# Patient Record
Sex: FEMALE | Race: BLACK OR AFRICAN AMERICAN | NOT HISPANIC OR LATINO | Employment: FULL TIME | URBAN - METROPOLITAN AREA
[De-identification: names, ages, dates, MRNs, and addresses within clinical notes are randomized per-mention and may not be internally consistent; named-entity substitution may affect disease eponyms.]

---

## 2017-04-21 ENCOUNTER — HOSPITAL ENCOUNTER (EMERGENCY)
Facility: HOSPITAL | Age: 27
Discharge: HOME | End: 2017-04-21
Attending: EMERGENCY MEDICINE
Payer: COMMERCIAL

## 2017-04-21 VITALS
OXYGEN SATURATION: 100 % | DIASTOLIC BLOOD PRESSURE: 84 MMHG | HEART RATE: 84 BPM | WEIGHT: 140 LBS | BODY MASS INDEX: 21.97 KG/M2 | RESPIRATION RATE: 18 BRPM | TEMPERATURE: 98 F | HEIGHT: 67 IN | SYSTOLIC BLOOD PRESSURE: 119 MMHG

## 2017-04-21 DIAGNOSIS — R10.84 GENERALIZED ABDOMINAL PAIN: Primary | ICD-10-CM

## 2017-04-21 DIAGNOSIS — R10.9 ABDOMINAL PAIN: ICD-10-CM

## 2017-04-21 LAB
ALBUMIN SERPL BCP-MCNC: 4.3 G/DL
ALP SERPL-CCNC: 64 U/L
ALT SERPL W/O P-5'-P-CCNC: 15 U/L
ANION GAP SERPL CALC-SCNC: 9 MMOL/L
AST SERPL-CCNC: 21 U/L
B-HCG UR QL: NEGATIVE
BASOPHILS # BLD AUTO: 0.01 K/UL
BASOPHILS NFR BLD: 0.1 %
BILIRUB SERPL-MCNC: 1.5 MG/DL
BILIRUB UR QL STRIP: NEGATIVE
BUN SERPL-MCNC: 12 MG/DL
CALCIUM SERPL-MCNC: 9.5 MG/DL
CHLORIDE SERPL-SCNC: 106 MMOL/L
CLARITY UR REFRACT.AUTO: CLEAR
CO2 SERPL-SCNC: 22 MMOL/L
COLOR UR AUTO: YELLOW
CREAT SERPL-MCNC: 0.8 MG/DL
CTP QC/QA: YES
DIFFERENTIAL METHOD: ABNORMAL
EOSINOPHIL # BLD AUTO: 0 K/UL
EOSINOPHIL NFR BLD: 0.1 %
ERYTHROCYTE [DISTWIDTH] IN BLOOD BY AUTOMATED COUNT: 11.8 %
EST. GFR  (AFRICAN AMERICAN): >60 ML/MIN/1.73 M^2
EST. GFR  (NON AFRICAN AMERICAN): >60 ML/MIN/1.73 M^2
GLUCOSE SERPL-MCNC: 89 MG/DL
GLUCOSE UR QL STRIP: NEGATIVE
HCT VFR BLD AUTO: 38.8 %
HGB BLD-MCNC: 14.1 G/DL
HGB UR QL STRIP: NEGATIVE
KETONES UR QL STRIP: ABNORMAL
LEUKOCYTE ESTERASE UR QL STRIP: NEGATIVE
LIPASE SERPL-CCNC: 17 U/L
LYMPHOCYTES # BLD AUTO: 0.4 K/UL
LYMPHOCYTES NFR BLD: 5.1 %
MCH RBC QN AUTO: 30.3 PG
MCHC RBC AUTO-ENTMCNC: 36.3 %
MCV RBC AUTO: 83 FL
MONOCYTES # BLD AUTO: 0.4 K/UL
MONOCYTES NFR BLD: 5.6 %
NEUTROPHILS # BLD AUTO: 6.4 K/UL
NEUTROPHILS NFR BLD: 89 %
NITRITE UR QL STRIP: NEGATIVE
PH UR STRIP: 7 [PH] (ref 5–8)
PLATELET # BLD AUTO: 201 K/UL
PMV BLD AUTO: 9.5 FL
POTASSIUM SERPL-SCNC: 3.7 MMOL/L
PROT SERPL-MCNC: 7.3 G/DL
PROT UR QL STRIP: NEGATIVE
RBC # BLD AUTO: 4.65 M/UL
SODIUM SERPL-SCNC: 137 MMOL/L
SP GR UR STRIP: 1.01 (ref 1–1.03)
URN SPEC COLLECT METH UR: ABNORMAL
UROBILINOGEN UR STRIP-ACNC: NEGATIVE EU/DL
WBC # BLD AUTO: 7.2 K/UL

## 2017-04-21 PROCEDURE — 93005 ELECTROCARDIOGRAM TRACING: CPT

## 2017-04-21 PROCEDURE — 81025 URINE PREGNANCY TEST: CPT | Performed by: EMERGENCY MEDICINE

## 2017-04-21 PROCEDURE — 83690 ASSAY OF LIPASE: CPT

## 2017-04-21 PROCEDURE — 25000003 PHARM REV CODE 250: Performed by: STUDENT IN AN ORGANIZED HEALTH CARE EDUCATION/TRAINING PROGRAM

## 2017-04-21 PROCEDURE — 93010 ELECTROCARDIOGRAM REPORT: CPT | Mod: ,,, | Performed by: INTERNAL MEDICINE

## 2017-04-21 PROCEDURE — 99284 EMERGENCY DEPT VISIT MOD MDM: CPT | Mod: 25

## 2017-04-21 PROCEDURE — 96372 THER/PROPH/DIAG INJ SC/IM: CPT

## 2017-04-21 PROCEDURE — 85025 COMPLETE CBC W/AUTO DIFF WBC: CPT

## 2017-04-21 PROCEDURE — 96361 HYDRATE IV INFUSION ADD-ON: CPT

## 2017-04-21 PROCEDURE — 63600175 PHARM REV CODE 636 W HCPCS: Performed by: STUDENT IN AN ORGANIZED HEALTH CARE EDUCATION/TRAINING PROGRAM

## 2017-04-21 PROCEDURE — 99285 EMERGENCY DEPT VISIT HI MDM: CPT | Mod: ,,, | Performed by: EMERGENCY MEDICINE

## 2017-04-21 PROCEDURE — 96374 THER/PROPH/DIAG INJ IV PUSH: CPT

## 2017-04-21 PROCEDURE — 81003 URINALYSIS AUTO W/O SCOPE: CPT

## 2017-04-21 PROCEDURE — 80053 COMPREHEN METABOLIC PANEL: CPT

## 2017-04-21 RX ORDER — DICYCLOMINE HYDROCHLORIDE 10 MG/ML
20 INJECTION INTRAMUSCULAR
Status: COMPLETED | OUTPATIENT
Start: 2017-04-21 | End: 2017-04-21

## 2017-04-21 RX ORDER — DICYCLOMINE HYDROCHLORIDE 20 MG/1
20 TABLET ORAL 2 TIMES DAILY
Qty: 60 TABLET | Refills: 3 | Status: SHIPPED | OUTPATIENT
Start: 2017-04-21 | End: 2017-10-24

## 2017-04-21 RX ORDER — ONDANSETRON 2 MG/ML
4 INJECTION INTRAMUSCULAR; INTRAVENOUS
Status: COMPLETED | OUTPATIENT
Start: 2017-04-21 | End: 2017-04-21

## 2017-04-21 RX ORDER — DICYCLOMINE HYDROCHLORIDE 20 MG/1
20 TABLET ORAL 2 TIMES DAILY
Qty: 60 TABLET | Refills: 0 | Status: SHIPPED | OUTPATIENT
Start: 2017-04-21 | End: 2017-04-21

## 2017-04-21 RX ADMIN — SODIUM CHLORIDE 1000 ML: 0.9 INJECTION, SOLUTION INTRAVENOUS at 07:04

## 2017-04-21 RX ADMIN — DICYCLOMINE HYDROCHLORIDE 20 MG: 20 INJECTION, SOLUTION INTRAMUSCULAR at 07:04

## 2017-04-21 RX ADMIN — ONDANSETRON 4 MG: 2 INJECTION INTRAMUSCULAR; INTRAVENOUS at 07:04

## 2017-04-21 NOTE — ED PROVIDER NOTES
Encounter Date: 4/21/2017    SCRIBE #1 NOTE: I, Krystina Galvan, am scribing for, and in the presence of,  Dr. Beach. I have scribed the following portions of the note - the Resident attestation and the EKG reading.       History     Chief Complaint   Patient presents with    Abdominal Pain     pt reports to ED with complaints of abd pain that she describes as pressure at the top of her abd, states she feels like it is a bloating feeling x4 days. reports nausea and vomiting, denies diarrhea     Review of patient's allergies indicates:  No Known Allergies  HPI Comments: 27 year old female presenting with worsening of her chronic abdominal cramps, pain, and nausea that occurs periodically especially during times of stress. She has seen a GI doctor for these symptoms in August of 2016 who thought the patient either had IBS vs Crohn's disease and wanted to the patient to undergo colonoscopy, abdominal US, and additional lab work. She did not undergo the additional work up because she received good relief from the bentyl the GI doctor prescribed. After she ran out of the prescription the symptoms returned. She reports feeling the same symptoms she usual does just more severe. Denies any fever or chills. No diarrhea. One episode of vomiting overnight. Reports regular menstrual periods, last one occurred on April 7th 2017.     The history is provided by the patient.     Past Medical History:   Diagnosis Date    Chronic diarrhea     Migraine headache      History reviewed. No pertinent surgical history.  Family History   Problem Relation Age of Onset    Hypertension Mother     No Known Problems Father     No Known Problems Sister     No Known Problems Brother     No Known Problems Maternal Aunt     No Known Problems Maternal Uncle     No Known Problems Paternal Aunt     No Known Problems Paternal Uncle     Cataracts Maternal Grandmother     No Known Problems Maternal Grandfather     No Known Problems  Paternal Grandmother     No Known Problems Paternal Grandfather     Blindness Neg Hx     Cancer Neg Hx     Diabetes Neg Hx     Glaucoma Neg Hx     Inflammatory bowel disease Neg Hx     Stomach cancer Neg Hx     Colon cancer Neg Hx     Rectal cancer Neg Hx     Esophageal cancer Neg Hx     Crohn's disease Neg Hx     Celiac disease Neg Hx     Amblyopia Neg Hx     Macular degeneration Neg Hx     Retinal detachment Neg Hx     Strabismus Neg Hx     Stroke Neg Hx     Thyroid disease Neg Hx      Social History   Substance Use Topics    Smoking status: Never Smoker    Smokeless tobacco: Never Used    Alcohol use Yes      Comment: socially     Review of Systems   Constitutional: Negative for chills and fever.   HENT: Negative for congestion, postnasal drip, rhinorrhea and sore throat.    Eyes: Negative for photophobia and visual disturbance.   Respiratory: Negative for cough and shortness of breath.    Cardiovascular: Negative for chest pain, palpitations and leg swelling.   Gastrointestinal: Positive for abdominal distention, abdominal pain, nausea and vomiting. Negative for blood in stool, constipation and diarrhea.   Genitourinary: Negative for dysuria and frequency.   Musculoskeletal: Negative for arthralgias and back pain.   Skin: Negative for rash and wound.   Neurological: Negative for dizziness, light-headedness and headaches.   Psychiatric/Behavioral: Negative for agitation and behavioral problems.       Physical Exam   Initial Vitals   BP Pulse Resp Temp SpO2   04/21/17 0607 04/21/17 0607 04/21/17 0607 04/21/17 0607 04/21/17 0607   127/83 105 18 97.9 °F (36.6 °C) 100 %     Physical Exam    Constitutional: She appears well-developed and well-nourished. She is not diaphoretic. She appears distressed.   HENT:   Head: Normocephalic and atraumatic.   Mouth/Throat: Oropharynx is clear and moist.   Eyes: Conjunctivae and EOM are normal. Pupils are equal, round, and reactive to light.   Neck: Normal  range of motion. Neck supple. No JVD present.   Cardiovascular: Normal rate, regular rhythm, normal heart sounds and intact distal pulses.   Pulmonary/Chest: Breath sounds normal. No respiratory distress. She has no wheezes. She has no rales.   Abdominal: Soft. Bowel sounds are normal. She exhibits distension. There is tenderness (generalized ). There is no rebound and no guarding.   Musculoskeletal: Normal range of motion.   Lymphadenopathy:     She has no cervical adenopathy.   Neurological: She is alert and oriented to person, place, and time. She has normal strength.   Skin: Skin is warm and dry. No rash noted. No erythema.   Psychiatric: She has a normal mood and affect. Thought content normal.         ED Course   Procedures  Labs Reviewed   CBC W/ AUTO DIFFERENTIAL - Abnormal; Notable for the following:        Result Value    MCHC 36.3 (*)     Lymph # 0.4 (*)     Gran% 89.0 (*)     Lymph% 5.1 (*)     All other components within normal limits   COMPREHENSIVE METABOLIC PANEL - Abnormal; Notable for the following:     CO2 22 (*)     Total Bilirubin 1.5 (*)     All other components within normal limits   URINALYSIS - Abnormal; Notable for the following:     Ketones, UA 1+ (*)     All other components within normal limits   LIPASE   POCT URINE PREGNANCY     EKG Readings: (Independently Interpreted)   HR 95. NSR. No STEMI.          Medical Decision Making:   History:   Old Medical Records: I decided to obtain old medical records.  Clinical Tests:   Lab Tests: Reviewed and Ordered  Medical Tests: Reviewed and Ordered       APC / Resident Notes:   Patient's is presenting with symptoms consistent with her chronic abdominal cramps, cramps, and nausea for which she was seen by GI for one year ago and thought to have IBS vs Crohn's. Patient failed to follow up for complete outpatient GI work up. Bentyl has relieved her symptoms in the past. Will rehydrate with one liter of fluids and give bentyl IM. Zofran for nausea.  Will check basic labs along with lipase and UPT.      Lab work and UPT negative. Patient reported great improvement in symptoms with Bentyl. Will discharge patient with a prescription for Bentyl and instructions to follow up with her GI physician for continued work up of chronic symptoms.        Scribe Attestation:   Scribe #1: I performed the above scribed service and the documentation accurately describes the services I performed. I attest to the accuracy of the note.    Attending Attestation:   Physician Attestation Statement for Resident:  As the supervising MD . -: The pt is a 27 y.o. female that presents with abdominal discomfort associated with N/V/D worsening today. Pt has had similar problems in the past and had been put on bentyl with some improvement. Pt was supposed to f/u with GI but had not due to resolution of past symptoms. Worsens under stress. No fever, chills, dysuria, blood in stool.  -: Uncomfortable but alert and cooperative. Diffuse tenderness of her abdomen, no rebound.   -: Will do labs, UPT, UA. Will give injection of bentyl and zofran.          Physician Attestation for Scribe:  Physician Attestation Statement for Scribe #1: I, Dr. Beach, reviewed documentation, as scribed by Krystina Galvan in my presence, and it is both accurate and complete.         Attending ED Notes:   Patient evaluated for her abdominal pain with physical exam blood work imaging and is felt that the patient has abdominal pain more than likely secondary to irritable bowel patient will be placed on medications for this and the patient is discharged is to follow-up with the GI clinic          ED Course     Clinical Impression:   The primary encounter diagnosis was Generalized abdominal pain. A diagnosis of Abdominal pain was also pertinent to this visit.    Disposition:   Disposition: Discharged  Condition: Stable       Thomas Pan MD  Resident  04/21/17 1526       Rick Beach MD  05/06/17  [id number]

## 2017-04-21 NOTE — ED TRIAGE NOTES
C/o abdominal cramping, bloating and pain x 4 days, and constant today. Has had similar pain in the past and was dx with IBS. Reports nothing makes the pain better or worse. Has constant nausea, 1 episode of emesis today, and diarrhea. Pt denies SOB/CP.

## 2017-04-21 NOTE — ED AVS SNAPSHOT
[facility]  [address] [address]               Demario Christine   2017  6:36 AM   ED    Description:  Female : 1990   Department:  [facility]           Your Care was Coordinated By:     Provider Role From To    Rick Beach MD Attending Provider 17 0648 --    Thomas Pan MD Resident 17 0641 --      Reason for Visit     Abdominal Pain           Diagnoses this Visit        Comments    Generalized abdominal pain    -  Primary     Abdominal pain           ED Disposition     None           To Do List            These Medications        Disp Refills Start End    dicyclomine (BENTYL) 20 mg tablet 60 tablet 0 2017    Take 1 tablet (20 mg total) by mouth 2 (two) times daily. - Oral    Pharmacy: [facility] - [place], [place] - [address] Ph #: [phone]         [facility]     [facility] Care Line -  Assistance  Unless otherwise directed by your provider, please contact [facility], our nurse care line that is available for  assistance.     Registered nurses in the [facility] provide: appointment scheduling, clinical advisement, health education, and other advisory services.  Call: [phone] (toll free)               Medications           Message regarding Medications     Verify the changes and/or additions to your medication regime listed below are the same as discussed with your clinician today.  If any of these changes or additions are incorrect, please notify your healthcare provider.        START taking these NEW medications        Refills    dicyclomine (BENTYL) 20 mg tablet 0    Sig: Take 1 tablet (20 mg total) by mouth 2 (two) times daily.    Class: Print    Route: Oral      These medications were administered today        Dose Freq    ondansetron injection 4 mg 4 mg ED 1 Time    Sig: Inject 4 mg  "into the vein ED 1 Time.    Class: Normal    Route: Intravenous    dicyclomine injection 20 mg 20 mg ED 1 Time    Sig: Inject 2 mLs (20 mg total) into the muscle ED 1 Time.    Class: Normal    Route: Intramuscular    sodium chloride 0.9% bolus 1,000 mL 1,000 mL Once    Sig: Inject 1,000 mLs into the vein once.    Class: Normal    Route: Intravenous           Verify that the below list of medications is an accurate representation of the medications you are currently taking.  If none reported, the list may be blank. If incorrect, please contact your healthcare provider. Carry this list with you in case of emergency.           Current Medications     dicyclomine (BENTYL) 20 mg tablet Take 1 tablet (20 mg total) by mouth 2 (two) times daily.    hyoscyamine (LEVSIN/SL) 0.125 mg Subl Place 1 tablet (0.125 mg total) under the tongue every 6 (six) hours as needed.    norethindrone (MICRONOR) 0.35 mg tablet Take 1 tablet (0.35 mg total) by mouth once daily.    ondansetron (ZOFRAN-ODT) 4 MG TbDL Take 2 tablets (8 mg total) by mouth every 8 (eight) hours as needed.    pantoprazole (PROTONIX) 40 MG tablet Take 1 tablet (40 mg total) by mouth once daily.    sumatriptan (IMITREX) 100 MG tablet Take 1 tablet (100 mg total) by mouth once. May repeat in 2 hours if headache persists. Max of 200mg/day           Clinical Reference Information           Your Vitals Were     BP Pulse Temp Resp Height Weight    127/83 105 97.9 °F (36.6 °C) (Oral) 18 5' 7" (1.702 m) 63.5 kg (140 lb)    Last Period SpO2 BMI          04/02/2017 (Exact Date) 100% 21.93 kg/m2        Allergies as of 4/21/2017     No Known Allergies      Immunizations Administered on Date of Encounter - 4/21/2017     None      ED Micro, Lab, POCT     Start Ordered       Status Ordering Provider    04/21/17 0711 04/21/17 0710  POCT urine pregnancy  Once      Final result     04/21/17 0702 04/21/17 0703  CBC W/ AUTO DIFFERENTIAL  STAT      Final result     04/21/17 0702 04/21/17 " 0703  Comp. Metabolic Panel  STAT      Final result     04/21/17 0702 04/21/17 0703  Lipase  Once      Final result     04/21/17 0702 04/21/17 0703  Urinalysis - Clean Catch  STAT      Final result       ED Imaging Orders     Start Ordered       Status Ordering Provider    04/21/17 0702 04/21/17 0703    1 time imaging,   Status:  Canceled      Canceled         Discharge Instructions       Schedule and appointment with your GI doctor as soon as possible. Take bentyl as prescribed.     Discharge References/Attachments     IBS, DIET AND LIFESTYLE TIPS (ENGLISH)       [facility] complies with applicable Federal civil rights laws and does not discriminate on the basis of race, color, national origin, age, disability, or sex.        Language Assistance Services     ATTENTION: Language assistance services are available, free of charge. Please call [phone].      ATENCIÓN: Si habla trey, tiene a hale disposición servicios gratuitos de asistencia lingüística. Llame al [phone].     CHÚ Ý: N?u b?n nói Ti?ng Vi?t, có các d?ch v? h? tr? ngôn ng? mi?n phí dành cho b?n. G?i s? [phone].

## 2017-04-24 ENCOUNTER — OFFICE VISIT (OUTPATIENT)
Dept: OPTOMETRY | Facility: CLINIC | Age: 27
End: 2017-04-24
Payer: COMMERCIAL

## 2017-04-24 DIAGNOSIS — H52.223 REGULAR ASTIGMATISM OF BOTH EYES: ICD-10-CM

## 2017-04-24 DIAGNOSIS — H53.023 REFRACTIVE AMBLYOPIA, BILATERAL: ICD-10-CM

## 2017-04-24 DIAGNOSIS — Z01.00 EXAMINATION OF EYES AND VISION: Primary | ICD-10-CM

## 2017-04-24 PROCEDURE — 92015 DETERMINE REFRACTIVE STATE: CPT | Mod: S$GLB,,, | Performed by: OPTOMETRIST

## 2017-04-24 PROCEDURE — 92014 COMPRE OPH EXAM EST PT 1/>: CPT | Mod: S$GLB,,, | Performed by: OPTOMETRIST

## 2017-04-24 PROCEDURE — 99999 PR PBB SHADOW E&M-EST. PATIENT-LVL I: CPT | Mod: PBBFAC,,, | Performed by: OPTOMETRIST

## 2017-04-24 NOTE — PROGRESS NOTES
Subjective:       Patient ID: Demario Christine is a 27 y.o. female      Chief Complaint   Patient presents with    Blurred Vision     History of Present Illness  Dls: 2/26/16 Dr. Perdomo    Pt c/o blurry vision at distance and near ou. Pt wears single vision   glasses and scls. Pt wants anew rx for glasses only. Pt lost glasses x 1   week.  Pt states no itching no burning no pain no floaters some ha's.     Eye meds:   None     Assessment/Plan:     1. Examination of eyes and vision  Healthy DFE OU    2. Refractive amblyopia, bilateral  Discussed importance of full time wear of SRx.     3. Regular astigmatism of both eyes  Educated patient on refractive error and discussed lens options. Gave pt Rx for specs. RTC in 1 year.    Eyeglass Final Rx     Eyeglass Final Rx      Sphere Cylinder Axis   Right -1.00 +4.50 085   Left -0.75 +3.50 105       Type:  SVL    Expiration Date:  4/25/2018                Return in about 1 year (around 4/24/2018) for Annual eye exam.

## 2017-04-28 ENCOUNTER — TELEPHONE (OUTPATIENT)
Dept: ENDOSCOPY | Facility: HOSPITAL | Age: 27
End: 2017-04-28

## 2017-04-28 ENCOUNTER — OFFICE VISIT (OUTPATIENT)
Dept: GASTROENTEROLOGY | Facility: CLINIC | Age: 27
End: 2017-04-28
Payer: COMMERCIAL

## 2017-04-28 VITALS
HEIGHT: 66 IN | BODY MASS INDEX: 23.42 KG/M2 | SYSTOLIC BLOOD PRESSURE: 129 MMHG | DIASTOLIC BLOOD PRESSURE: 87 MMHG | WEIGHT: 145.75 LBS | HEART RATE: 76 BPM

## 2017-04-28 DIAGNOSIS — Z12.11 SPECIAL SCREENING FOR MALIGNANT NEOPLASMS, COLON: Primary | ICD-10-CM

## 2017-04-28 DIAGNOSIS — Z79.1 ENCOUNTER FOR LONG-TERM (CURRENT) USE OF NSAIDS: ICD-10-CM

## 2017-04-28 DIAGNOSIS — R10.10 UPPER ABDOMINAL PAIN: ICD-10-CM

## 2017-04-28 DIAGNOSIS — R11.2 NON-INTRACTABLE VOMITING WITH NAUSEA, UNSPECIFIED VOMITING TYPE: Primary | ICD-10-CM

## 2017-04-28 DIAGNOSIS — K59.1 FUNCTIONAL DIARRHEA: ICD-10-CM

## 2017-04-28 DIAGNOSIS — R10.30 LOWER ABDOMINAL PAIN: ICD-10-CM

## 2017-04-28 PROCEDURE — 99214 OFFICE O/P EST MOD 30 MIN: CPT | Mod: S$GLB,,, | Performed by: PHYSICIAN ASSISTANT

## 2017-04-28 PROCEDURE — 99999 PR PBB SHADOW E&M-EST. PATIENT-LVL III: CPT | Mod: PBBFAC,,, | Performed by: PHYSICIAN ASSISTANT

## 2017-04-28 RX ORDER — POLYETHYLENE GLYCOL 3350, SODIUM SULFATE ANHYDROUS, SODIUM BICARBONATE, SODIUM CHLORIDE, POTASSIUM CHLORIDE 236; 22.74; 6.74; 5.86; 2.97 G/4L; G/4L; G/4L; G/4L; G/4L
4 POWDER, FOR SOLUTION ORAL ONCE
Qty: 4000 ML | Refills: 0 | Status: SHIPPED | OUTPATIENT
Start: 2017-04-28 | End: 2017-04-28

## 2017-04-28 RX ORDER — ONDANSETRON 4 MG/1
8 TABLET, ORALLY DISINTEGRATING ORAL EVERY 8 HOURS PRN
Qty: 60 TABLET | Refills: 0 | Status: SHIPPED | OUTPATIENT
Start: 2017-04-28 | End: 2017-09-11 | Stop reason: SDUPTHER

## 2017-04-28 RX ORDER — DIPHENOXYLATE HYDROCHLORIDE AND ATROPINE SULFATE 2.5; .025 MG/1; MG/1
1 TABLET ORAL 4 TIMES DAILY PRN
Qty: 120 TABLET | Refills: 3 | Status: SHIPPED | OUTPATIENT
Start: 2017-04-28 | End: 2017-10-30 | Stop reason: SDUPTHER

## 2017-04-28 NOTE — MR AVS SNAPSHOT
[facility]  [address]  [facility] [address]  Phone: [phone]  Fax: [phone]                  Demario Christine   2017 11:00 AM   Office Visit    Description:  Female : 1990   Provider:  Guillaume Addison PA-C   Department:  [facility]           Reason for Visit     GI Problem     Diarrhea           Diagnoses this Visit        Comments    Non-intractable vomiting with nausea, unspecified vomiting type    -  Primary     Functional diarrhea         Upper abdominal pain         Lower abdominal pain                To Do List           Future Appointments        Provider Department Dept Phone    2017 7:15 AM [facility] ALL [facility] [phone]    2017 10:00 AM Guillaume Addison PA-C [facility] [phone]      Goals (5 Years of Data)     None       These Medications        Disp Refills Start End    ondansetron (ZOFRAN-ODT) 4 MG TbDL 60 tablet 0 2017     Take 2 tablets (8 mg total) by mouth every 8 (eight) hours as needed (nausea). - Oral    Pharmacy: [facility] #[id number] - [place] - [address] Ph #: [phone]       diphenoxylate-atropine 2.5-0.025 mg (LOMOTIL) 2.5-0.025 mg per tablet 120 tablet 3 2017     Take 1 tablet by mouth 4 (four) times daily as needed for Diarrhea. - Oral    Pharmacy: [facility] #[id number] - [place] - [address] Ph #: [phone]         [facility]     [facility] Care [facility] - 24/ Assistance  Unless otherwise directed by your provider, please contact [facility], our nurse care line that is available for 24/ assistance.     Registered nurses in the [facility] provide: appointment scheduling, clinical advisement, health education, and other advisory services.  Call: [phone] (toll free)               Medications           Message regarding Medications     Verify the changes and/or additions to your  medication regime listed below are the same as discussed with your clinician today.  If any of these changes or additions are incorrect, please notify your healthcare provider.        START taking these NEW medications        Refills    diphenoxylate-atropine 2.5-0.025 mg (LOMOTIL) 2.5-0.025 mg per tablet 3    Sig: Take 1 tablet by mouth 4 (four) times daily as needed for Diarrhea.    Class: Print    Route: Oral      CHANGE how you are taking these medications     Start Taking Instead of    ondansetron (ZOFRAN-ODT) 4 MG TbDL ondansetron (ZOFRAN-ODT) 4 MG TbDL    Dosage:  Take 2 tablets (8 mg total) by mouth every 8 (eight) hours as needed (nausea). Dosage:  Take 2 tablets (8 mg total) by mouth every 8 (eight) hours as needed.    Reason for Change:  Reorder            Verify that the below list of medications is an accurate representation of the medications you are currently taking.  If none reported, the list may be blank. If incorrect, please contact your healthcare provider. Carry this list with you in case of emergency.           Current Medications     dicyclomine (BENTYL) 20 mg tablet Take 1 tablet (20 mg total) by mouth 2 (two) times daily.    diphenoxylate-atropine 2.5-0.025 mg (LOMOTIL) 2.5-0.025 mg per tablet Take 1 tablet by mouth 4 (four) times daily as needed for Diarrhea.    hyoscyamine (LEVSIN/SL) 0.125 mg Subl Place 1 tablet (0.125 mg total) under the tongue every 6 (six) hours as needed.    norethindrone (MICRONOR) 0.35 mg tablet Take 1 tablet (0.35 mg total) by mouth once daily.    ondansetron (ZOFRAN-ODT) 4 MG TbDL Take 2 tablets (8 mg total) by mouth every 8 (eight) hours as needed (nausea).    pantoprazole (PROTONIX) 40 MG tablet Take 1 tablet (40 mg total) by mouth once daily.    sumatriptan (IMITREX) 100 MG tablet Take 1 tablet (100 mg total) by mouth once. May repeat in 2 hours if headache persists. Max of 200mg/day           Clinical Reference Information           Your Vitals Were     BP  "Pulse Height Weight Last Period BMI    129/87 76 5' 6" (1.676 m) 66.1 kg (145 lb 11.6 oz) 04/02/2017 (Exact Date) 23.52 kg/m2      Blood Pressure          Most Recent Value    BP  129/87      Allergies as of 4/28/2017     No Known Allergies      Immunizations Administered on Date of Encounter - 4/28/2017     None      Orders Placed During Today's Visit      Normal Orders This Visit    Case request GI: ESOPHAGOGASTRODUODENOSCOPY (EGD)       Instructions    Take the the bentyl every 8 hrs       Language Assistance Services     ATTENTION: Language assistance services are available, free of charge. Please call [phone].      ATENCIÓN: Si habla español, tiene a hale disposición servicios gratuitos de asistencia lingüística. Llame al [phone].     TERE Ý: N?u b?n nói Ti?ng Vi?t, có các d?ch v? h? tr? ngôn ng? mi?n phí dành cho b?n. G?i s? [phone].         [facility] complies with applicable Federal civil rights laws and does not discriminate on the basis of race, color, national origin, age, disability, or sex.        "

## 2017-04-28 NOTE — PROGRESS NOTES
[facility] Consultation Note    Reason for Consult:  The primary encounter diagnosis was Non-intractable vomiting with nausea, unspecified vomiting type. Diagnoses of Functional diarrhea, Upper abdominal pain, Lower abdominal pain, and Encounter for long-term (current) use of NSAIDs were also pertinent to this visit.    PCP: Gianna Porter     REF MD: Gianna Porter MD  [address] [facility]  [place] [address]    HPI:  This is a 27 y.o. female here for evaluation of diarrhea and abdominal pain  LAst seen in the clinic 6/2016 for similar issues.  Her abdominal pain and diarrhea had improved taking the dicyclomine and lomotil.  She was doing well until she ran out of her medication.    She was seen in the ED 4/21/17 for severe abdominal pain woke her up at 12am    Started 1yr  ago with diarrhea, then left and right lower abdominal pain started  Today she complaints of intermittent lower abdominal pain  Typically 5-6 BMS daily  Today pain is 4/10, but can increase to 10/10  spasm type of pain  No radiation  Pain typically in the morning, and intermittent during the day.    Lasts a few minutes, then resolves.  Diarrhea is worse with meals    40lbs weight loss about 10 month ago  Tried imodium without relief    associated fullness and bloated  Nausea in the AM  Occasional vomiting  + Early satiety    6/15/16 labs: WBC normal. No anemia, Lfts normal. Negative ova/para and giardia stool studies. H. Pylori negative. Lipase normal.      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 Symptoms started soon after  Ibuprofen 800 BID prn for menstrual cramps and headaches    ROS:  Constitutional: No fevers, chills, + 40lbs weight loss in 3-4 months  ENT: No allergies  CV: No chest  pain  Pulm: No cough, no shortness of breath  Ophtho: No vision changes  GI: see HPI  Derm: No rash  Heme: No lymphadenopathy, + bruising  MSK: Rt knee pain injury  : No dysuria, No hematuria  Endo: No hot or cold intolerance  Neuro: No syncope, No seizure  Psych: + anxiety, + depression    Medical History:  has a past medical history of Chronic diarrhea and Migraine headache.    Surgical History:  has no past surgical history on file.    Family History: family history includes Cataracts in her maternal grandmother; Hypertension in her mother; No Known Problems in her brother, father, maternal aunt, maternal grandfather, maternal uncle, paternal aunt, paternal grandfather, paternal grandmother, paternal uncle, and sister. There is no history of Blindness, Cancer, Diabetes, Glaucoma, Inflammatory bowel disease, Stomach cancer, Colon cancer, Rectal cancer, Esophageal cancer, Crohn's disease, Celiac disease, Amblyopia, Macular degeneration, Retinal detachment, Strabismus, Stroke, or Thyroid disease..     Social History:  reports that she has never smoked. She has never used smokeless tobacco. She reports that she drinks alcohol. She reports that she does not use illicit drugs.    Review of patient's allergies indicates:  No Known Allergies    Current Outpatient Prescriptions   Medication Sig    dicyclomine (BENTYL) 20 mg tablet Take 1 tablet (20 mg total) by mouth 2 (two) times daily.    diphenoxylate-atropine 2.5-0.025 mg (LOMOTIL) 2.5-0.025 mg per tablet Take 1 tablet by mouth 4 (four) times daily as needed for Diarrhea.    hyoscyamine (LEVSIN/SL) 0.125 mg Subl Place 1 tablet (0.125 mg total) under the tongue every 6 (six) hours as needed.    norethindrone (MICRONOR) 0.35 mg tablet Take 1 tablet (0.35 mg total) by mouth once daily.    ondansetron (ZOFRAN-ODT) 4 MG TbDL Take 2 tablets (8 mg total) by mouth every 8 (eight) hours as needed (nausea).    pantoprazole (PROTONIX) 40 MG tablet Take 1 tablet (40 mg  "total) by mouth once daily.    polyethylene glycol (GOLYTELY,NULYTELY) 236-22.74-6.74 -5.86 gram suspension Take 4,000 mLs (4 L total) by mouth once.    sumatriptan (IMITREX) 100 MG tablet Take 1 tablet (100 mg total) by mouth once. May repeat in 2 hours if headache persists. Max of 200mg/day     No current facility-administered medications for this visit.          Objective Findings:    Vital Signs:  /87  Pulse 76  Ht 5' 6" (1.676 m)  Wt 66.1 kg (145 lb 11.6 oz)  LMP 04/02/2017 (Exact Date)  BMI 23.52 kg/m2  Body mass index is 23.52 kg/(m^2).    Physical Exam:  General Appearance: Well appearing in no acute distress  Head:   Normocephalic, without obvious abnormality  Eyes:    No scleral icterus, EOMI  ENT: Neck supple, Lips, mucosa, and tongue normal; teeth and gums normal  Lungs: CTA bilaterally in anterior and posterior fields, no wheezes, no crackles.  Heart:  Regular rate and rhythm, S1, S2 normal, no murmurs heard  Abdomen: Soft, diffusely tender to palpation, non distended with positive bowel sounds in all four quadrants. No guarding.   Extremities: 2+ pulses, no clubbing, cyanosis or edema  Skin: No rash  Neurologic: AAO x 3      Labs:  Lab Results   Component Value Date    WBC 7.20 04/21/2017    HGB 14.1 04/21/2017    HCT 38.8 04/21/2017     04/21/2017    CHOL 129 05/26/2016    TRIG 48 05/26/2016    HDL 43 05/26/2016    ALT 15 04/21/2017    AST 21 04/21/2017     04/21/2017    K 3.7 04/21/2017     04/21/2017    CREATININE 0.8 04/21/2017    BUN 12 04/21/2017    CO2 22 (L) 04/21/2017    TSH 0.220 (L) 05/26/2016    HGBA1C 4.7 05/26/2016     Lab Results   Component Value Date    LIPASE 17 04/21/2017     ttg- neg    Endoscopy:    None    Assessment:  1. Non-intractable vomiting with nausea, unspecified vomiting type    2. Functional diarrhea    3. Upper abdominal pain    4. Lower abdominal pain    5. Encounter for long-term (current) use of NSAIDs       Crohn's disease is possible " considering her history. 40lb weight loss.   She may also have stomach ulcer considering her frequent NSAIDs use  Considering sx started after dropping out of pharmacy school IBS is also possible.    Recommendations:  1. Schedule colonoscopy with visualization of the terminal ileum to rule out ulcers and malignancies.     Schedule EGD to rule out ulcers    2. Order stool culture and C. Diff to rule out infection.    4. Bentyl TID prn    5. Take lomotil prn for diarrhea, since no relief with imodium    6. Order ABD US to rule out gallbladder disease.    7. zofran prn for nausea    No Follow-up on file.      Order summary:  Orders Placed This Encounter    ondansetron (ZOFRAN-ODT) 4 MG TbDL    diphenoxylate-atropine 2.5-0.025 mg (LOMOTIL) 2.5-0.025 mg per tablet    Case request GI: ESOPHAGOGASTRODUODENOSCOPY (EGD)         Thank you so much for allowing me to participate in the care of Demario Addison MD

## 2017-04-28 NOTE — LETTER
April 28, 2017      Gianna Porter MD  [address]  [place] [address]           [facility]  [address]  [place] [address]  Phone: [phone]  Fax: [phone]          Patient: Demario Christine   MR Number: [id number]   YOB: 1990   Date of Visit: 4/28/2017       Dear Dr. Gianna Porter:    Thank you for referring Demario Christine to me for evaluation. Attached you will find relevant portions of my assessment and plan of care.    If you have questions, please do not hesitate to call me. I look forward to following Demario Christine along with you.    Sincerely,    JALEESA Reyes  CC:  No Recipients    If you would like to receive this communication electronically, please contact [email or web address] or [phone] to request more information on [employer] access.    For providers and/or their staff who would like to refer a patient to [facility], please contact us through our one-stop-shop provider referral line, [facility], at [phone].    If you feel you have received this communication in error or would no longer like to receive these types of communications, please e-mail [email or web address]

## 2017-05-08 ENCOUNTER — HOSPITAL ENCOUNTER (OUTPATIENT)
Dept: RADIOLOGY | Facility: HOSPITAL | Age: 27
Discharge: HOME | End: 2017-05-08
Attending: INTERNAL MEDICINE
Payer: COMMERCIAL

## 2017-05-08 DIAGNOSIS — R10.30 LOWER ABDOMINAL PAIN: ICD-10-CM

## 2017-05-08 DIAGNOSIS — R11.2 NAUSEA AND VOMITING: ICD-10-CM

## 2017-05-08 PROCEDURE — 76700 US EXAM ABDOM COMPLETE: CPT | Mod: TC

## 2017-05-08 PROCEDURE — 76700 US EXAM ABDOM COMPLETE: CPT | Mod: 26,,, | Performed by: RADIOLOGY

## 2017-05-09 ENCOUNTER — PATIENT MESSAGE (OUTPATIENT)
Dept: GASTROENTEROLOGY | Facility: CLINIC | Age: 27
End: 2017-05-09

## 2017-05-12 ENCOUNTER — OFFICE VISIT (OUTPATIENT)
Dept: FAMILY MEDICINE | Facility: CLINIC | Age: 27
End: 2017-05-12
Payer: COMMERCIAL

## 2017-05-12 VITALS
RESPIRATION RATE: 16 BRPM | BODY MASS INDEX: 22.43 KG/M2 | TEMPERATURE: 99 F | DIASTOLIC BLOOD PRESSURE: 78 MMHG | WEIGHT: 139.56 LBS | OXYGEN SATURATION: 99 % | HEART RATE: 76 BPM | SYSTOLIC BLOOD PRESSURE: 102 MMHG | HEIGHT: 66 IN

## 2017-05-12 DIAGNOSIS — M79.10 MYALGIA: ICD-10-CM

## 2017-05-12 DIAGNOSIS — R11.0 NAUSEA: Primary | ICD-10-CM

## 2017-05-12 LAB
B-HCG UR QL: NEGATIVE
CTP QC/QA: YES

## 2017-05-12 PROCEDURE — 99214 OFFICE O/P EST MOD 30 MIN: CPT | Mod: 25,S$GLB,, | Performed by: FAMILY MEDICINE

## 2017-05-12 PROCEDURE — 99999 PR PBB SHADOW E&M-EST. PATIENT-LVL III: CPT | Mod: PBBFAC,,, | Performed by: FAMILY MEDICINE

## 2017-05-12 PROCEDURE — 96372 THER/PROPH/DIAG INJ SC/IM: CPT | Mod: S$GLB,,, | Performed by: FAMILY MEDICINE

## 2017-05-12 PROCEDURE — 81025 URINE PREGNANCY TEST: CPT | Mod: QW,S$GLB,, | Performed by: FAMILY MEDICINE

## 2017-05-12 RX ORDER — PROMETHAZINE HYDROCHLORIDE 25 MG/ML
12.5 INJECTION, SOLUTION INTRAMUSCULAR; INTRAVENOUS
Status: COMPLETED | OUTPATIENT
Start: 2017-05-12 | End: 2017-05-12

## 2017-05-12 RX ADMIN — PROMETHAZINE HYDROCHLORIDE 12.5 MG: 25 INJECTION, SOLUTION INTRAMUSCULAR; INTRAVENOUS at 12:05

## 2017-05-12 NOTE — PROGRESS NOTES
Chief Complaint   Patient presents with    Diarrhea    Generalized Body Aches    Fever       HPI  Brandyneyashley Christine is a 27 y.o. female with multiple medical diagnoses as listed in the medical history and problem list that presents for follow up.    Body aches, fever, chills, +nausea, URI symptoms. States no urinary symptoms, hx of IBS, Previous imaging without abnormality, has scope scheduled in 3 days. +diarrhea, no recent travel.     Pt is known to me and was last seen by me on Visit date not found.    PAST MEDICAL HISTORY:  Past Medical History:   Diagnosis Date    Chronic diarrhea     Migraine headache        PAST SURGICAL HISTORY:  History reviewed. No pertinent surgical history.    SOCIAL HISTORY:  Social History     Social History    Marital status: Single     Spouse name: N/A    Number of children: N/A    Years of education: N/A     Occupational History    Not on file.     Social History Main Topics    Smoking status: Never Smoker    Smokeless tobacco: Never Used    Alcohol use Yes      Comment: socially    Drug use: No    Sexual activity: Yes     Partners: Male     Birth control/ protection: Condom     Other Topics Concern    Not on file     Social History Narrative       FAMILY HISTORY:  Family History   Problem Relation Age of Onset    Hypertension Mother     No Known Problems Father     No Known Problems Sister     No Known Problems Brother     No Known Problems Maternal Aunt     No Known Problems Maternal Uncle     No Known Problems Paternal Aunt     No Known Problems Paternal Uncle     Cataracts Maternal Grandmother     No Known Problems Maternal Grandfather     No Known Problems Paternal Grandmother     No Known Problems Paternal Grandfather     Blindness Neg Hx     Cancer Neg Hx     Diabetes Neg Hx     Glaucoma Neg Hx     Inflammatory bowel disease Neg Hx     Stomach cancer Neg Hx     Colon cancer Neg Hx     Rectal cancer Neg Hx     Esophageal cancer Neg Hx      Crohn's disease Neg Hx     Celiac disease Neg Hx     Amblyopia Neg Hx     Macular degeneration Neg Hx     Retinal detachment Neg Hx     Strabismus Neg Hx     Stroke Neg Hx     Thyroid disease Neg Hx        ALLERGIES AND MEDICATIONS: updated and reviewed.  Review of patient's allergies indicates:  No Known Allergies  Current Outpatient Prescriptions   Medication Sig Dispense Refill    dicyclomine (BENTYL) 20 mg tablet Take 1 tablet (20 mg total) by mouth 2 (two) times daily. 60 tablet 3    diphenoxylate-atropine 2.5-0.025 mg (LOMOTIL) 2.5-0.025 mg per tablet Take 1 tablet by mouth 4 (four) times daily as needed for Diarrhea. 120 tablet 3    ondansetron (ZOFRAN-ODT) 4 MG TbDL Take 2 tablets (8 mg total) by mouth every 8 (eight) hours as needed (nausea). 60 tablet 0    hyoscyamine (LEVSIN/SL) 0.125 mg Subl Place 1 tablet (0.125 mg total) under the tongue every 6 (six) hours as needed. 120 tablet 0    pantoprazole (PROTONIX) 40 MG tablet Take 1 tablet (40 mg total) by mouth once daily. 30 tablet 1     No current facility-administered medications for this visit.        ROS  Review of Systems   Constitutional: Positive for fatigue. Negative for activity change, appetite change and fever.   HENT: Negative for congestion and sore throat.    Eyes: Negative for visual disturbance.   Respiratory: Negative for cough and shortness of breath.    Cardiovascular: Negative for chest pain.   Gastrointestinal: Positive for diarrhea, nausea and vomiting. Negative for abdominal pain.   Endocrine: Negative.    Genitourinary: Negative for dysuria.   Musculoskeletal: Negative for arthralgias and back pain.   Skin: Negative for rash.   Allergic/Immunologic: Negative.    Neurological: Negative for dizziness, weakness and headaches.   Hematological: Negative.    Psychiatric/Behavioral: Negative for agitation and confusion.       Physical Exam  Vitals:    05/12/17 1136   BP: 102/78   Pulse: 76   Resp: 16   Temp: 98.8 °F (37.1 °C)  "   Body mass index is 22.52 kg/(m^2).  Weight: 63.3 kg (139 lb 8.8 oz)   Height: 5' 6" (167.6 cm)     Physical Exam   Constitutional: She is oriented to person, place, and time. She appears well-developed and well-nourished.   HENT:   Head: Normocephalic.   Neurological: She is alert and oriented to person, place, and time.   Psychiatric: She has a normal mood and affect. Her behavior is normal. Judgment and thought content normal.       Health Maintenance       Date Due Completion Date    Influenza Vaccine 8/1/2017 ---    TETANUS VACCINE 7/14/2018 7/14/2008    Pap Smear 8/9/2019 8/9/2016          Assessment & Plan    Nausea  -     POCT Influenza A/B  -     POCT urine dipstick without microscope  -     promethazine injection 12.5 mg; Inject 0.5 mLs (12.5 mg total) into the muscle one time.  -     POCT Urine Pregnancy  - Will treat conservatively, antiemetics  - Scope scheduled     Myalgia  -     POCT Influenza A/B  -     POCT urine dipstick without microscope        Return in about 4 weeks (around 6/9/2017).          "

## 2017-05-12 NOTE — MR AVS SNAPSHOT
[facility]  [address]  [place] [address]  Phone: [phone]  Fax: [phone]                  Demario Christine   2017 11:00 AM   Office Visit    Description:  Female : 1990   Provider:  Cristiano Streeter MD   Department:  [place] [facility]           Reason for Visit     Diarrhea     Generalized Body Aches     Fever           Diagnoses this Visit        Comments    Nausea    -  Primary     Myalgia                To Do List           Future Appointments        Provider Department Dept Phone    2017 10:00 AM Guillaume Addison PA-C [facility] [phone]      Your Future Surgeries/Procedures     May 15, 2017   Surgery with Errol Hernandez MD   [facility])    [address] [facility] [address] [phone]              Goals (5 Years of Data)     None      [facility]     [facility] Nurse Care Line -  Assistance  Unless otherwise directed by your provider, please contact [facility], our nurse care line that is available for  assistance.     Registered nurses in the [facility] provide: appointment scheduling, clinical advisement, health education, and other advisory services.  Call: [phone] (toll free)               Medications           Message regarding Medications     Verify the changes and/or additions to your medication regime listed below are the same as discussed with your clinician today.  If any of these changes or additions are incorrect, please notify your healthcare provider.        These medications were administered today        Dose Freq    promethazine injection 12.5 mg 12.5 mg Clinic/HOD 1 time    Sig: Inject 0.5 mLs (12.5 mg total) into the muscle one time.    Class: Normal    Route: Intramuscular      STOP taking these medications     norethindrone (MICRONOR) 0.35 mg tablet Take 1 tablet (0.35 mg total) by mouth once  "daily.    sumatriptan (IMITREX) 100 MG tablet Take 1 tablet (100 mg total) by mouth once. May repeat in 2 hours if headache persists. Max of 200mg/day           Verify that the below list of medications is an accurate representation of the medications you are currently taking.  If none reported, the list may be blank. If incorrect, please contact your healthcare provider. Carry this list with you in case of emergency.           Current Medications     dicyclomine (BENTYL) 20 mg tablet Take 1 tablet (20 mg total) by mouth 2 (two) times daily.    diphenoxylate-atropine 2.5-0.025 mg (LOMOTIL) 2.5-0.025 mg per tablet Take 1 tablet by mouth 4 (four) times daily as needed for Diarrhea.    ondansetron (ZOFRAN-ODT) 4 MG TbDL Take 2 tablets (8 mg total) by mouth every 8 (eight) hours as needed (nausea).    hyoscyamine (LEVSIN/SL) 0.125 mg Subl Place 1 tablet (0.125 mg total) under the tongue every 6 (six) hours as needed.    pantoprazole (PROTONIX) 40 MG tablet Take 1 tablet (40 mg total) by mouth once daily.           Clinical Reference Information           Your Vitals Were     BP Pulse Temp Resp Height Weight    102/78 (BP Location: Left arm, Patient Position: Sitting, BP Method: Manual) 76 98.8 °F (37.1 °C) (Oral) 16 5' 6" (1.676 m) 63.3 kg (139 lb 8.8 oz)    Last Period SpO2 BMI          05/03/2017 99% 22.52 kg/m2        Blood Pressure          Most Recent Value    BP  102/78      Allergies as of 5/12/2017     No Known Allergies      Immunizations Administered on Date of Encounter - 5/12/2017     None      Orders Placed During Today's Visit      Normal Orders This Visit    POCT Influenza A/B     POCT urine dipstick without microscope     POCT Urine Pregnancy       Administrations This Visit     promethazine injection 12.5 mg     Admin Date Action Dose Route Administered By             05/12/2017 Given 12.5 mg Intramuscular Tatiana Ellison LPN                      Language Assistance Services     ATTENTION: Language " assistance services are available, free of charge. Please call [phone].      ATENCIÓN: Si habla trey, tiene a hale disposición servicios gratuitos de asistencia lingüística. Llame al [phone].     CHÚ Ý: N?u b?n nói Ti?ng Vi?t, có các d?ch v? h? tr? ngôn ng? mi?n phí dành cho b?n. G?i s? [phone].         [place] [facility] complies with applicable Federal civil rights laws and does not discriminate on the basis of race, color, national origin, age, disability, or sex.

## 2017-05-12 NOTE — PROGRESS NOTES
..Patient given promethazine 12.5 mg injection right ventrgluteal, tolerated well, no complaints, no reaction noted

## 2017-05-12 NOTE — LETTER
May 12, 2017      [place] [facility] [address] [place] [address]  Phone: [phone]  Fax: [phone]       Patient: Demario Christine   YOB: 1990  Date of Visit: 05/12/2017    To Whom It May Concern:    Demario Correia was at [facility] on 05/12/2017. She may return to work/school on 5/15/2017 with no restrictions. If you have any questions or concerns, or if I can be of further assistance, please do not hesitate to contact me.    Sincerely,  ..  Cristiano Streeter MD

## 2017-09-09 ENCOUNTER — PATIENT MESSAGE (OUTPATIENT)
Dept: FAMILY MEDICINE | Facility: CLINIC | Age: 27
End: 2017-09-09

## 2017-09-11 DIAGNOSIS — R11.2 NON-INTRACTABLE VOMITING WITH NAUSEA, UNSPECIFIED VOMITING TYPE: ICD-10-CM

## 2017-09-11 RX ORDER — ONDANSETRON 4 MG/1
8 TABLET, ORALLY DISINTEGRATING ORAL EVERY 8 HOURS PRN
Qty: 30 TABLET | Refills: 0 | OUTPATIENT
Start: 2017-09-11 | End: 2017-10-06 | Stop reason: SDUPTHER

## 2017-10-04 ENCOUNTER — OFFICE VISIT (OUTPATIENT)
Dept: FAMILY MEDICINE | Facility: CLINIC | Age: 27
End: 2017-10-04
Payer: COMMERCIAL

## 2017-10-04 VITALS
DIASTOLIC BLOOD PRESSURE: 86 MMHG | HEART RATE: 68 BPM | SYSTOLIC BLOOD PRESSURE: 126 MMHG | BODY MASS INDEX: 22.89 KG/M2 | TEMPERATURE: 98 F | HEIGHT: 65 IN | OXYGEN SATURATION: 99 % | WEIGHT: 137.38 LBS

## 2017-10-04 DIAGNOSIS — F41.9 ANXIETY: Primary | ICD-10-CM

## 2017-10-04 PROCEDURE — 99214 OFFICE O/P EST MOD 30 MIN: CPT | Mod: S$GLB,,, | Performed by: INTERNAL MEDICINE

## 2017-10-04 PROCEDURE — 99999 PR PBB SHADOW E&M-EST. PATIENT-LVL III: CPT | Mod: PBBFAC,,, | Performed by: INTERNAL MEDICINE

## 2017-10-04 RX ORDER — SERTRALINE HYDROCHLORIDE 50 MG/1
50 TABLET, FILM COATED ORAL DAILY
Qty: 30 TABLET | Refills: 5 | Status: SHIPPED | OUTPATIENT
Start: 2017-10-04 | End: 2018-01-26 | Stop reason: SDUPTHER

## 2017-10-04 NOTE — PROGRESS NOTES
This note was created by combination of typed  and Dragon dictation.  Transcription errors may be present.  If there are any questions, please contact me.    Assessment & Plan  Anxiety - either caused by abd issues or causing the abd issues.  Anxiety re: work and school.  Is open to rx.  After discussion start sertraline 50. SE profile discussed.  Not taking any contraceptive and we discussed need for barrier contraceptive on the rx, though not absolutely contraindicated during pregnancy.   -     sertraline (ZOLOFT) 50 MG tablet; Take 1 tablet (50 mg total) by mouth once daily.  Dispense: 30 tablet; Refill: 5    Chronic abd pain - either caused by or causing anxiety.  Needs to f/u with GI.  Given handout re: FODMAP diet, see if that can help in the interim.    Medications Discontinued During This Encounter   Medication Reason    hyoscyamine (LEVSIN/SL) 0.125 mg Subl        Follow-up: No Follow-up on file. OV 1 month with her PCP      =================================================================      Chief Complaint   Patient presents with    stomach issues     causing panic attacks    Panic Attack       HPI  Demario is a 27 y.o. female, last appointment with this clinic was Visit date not found.    Patient's last menstrual period was 09/22/2017.    abd pain followed by GI. Hx of dicyclomine and lomotil with efficacy. immodiuim without relief. rec's were EGD and colonoscopy.  Did not get those done.  2/2014 CT abd/pelvis normal.  5/2017 abd US normal  Migraines. With phonophobia and photophobia  8/2016 pap smear normal repeat 3 years.    Thinks that her stomach is causing her to have panic attacks with heart racing, shaking.  Had an episode last night and called EMS but was able to calm down and did not go in to the hospital.  Had had an appt for endoscopy but GI had to reschedule.  She's busy.   Her stomach pain woke her up.  Some anxiety surrounding school and work.  Low appetite b/c she is afraid  that certain foods will trigger diarrhea. Feels like she is anxious re: work and school more than her peers might be.  Denies SI, HI, Ah, VH, not outright katherine though sometimes she gets bouts of energy.  Sometimes can't sleep.     Patient Care Team:  Gianna Porter MD as PCP - General (Family Medicine)    Patient Active Problem List    Diagnosis Date Noted    Migraine headache        PAST MEDICAL HISTORY:  Past Medical History:   Diagnosis Date    Chronic diarrhea     Migraine headache        PAST SURGICAL HISTORY:  Past Surgical History:   Procedure Laterality Date    COLONOSCOPY N/A 5/15/2017    Procedure: COLONOSCOPY;  Surgeon: Errol Hernandez MD;  Location: [address]);  Service: Endoscopy;  Laterality: N/A;       SOCIAL HISTORY:  Social History     Social History    Marital status: Single     Spouse name: N/A    Number of children: N/A    Years of education: N/A     Occupational History     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      Social History Main Topics    Smoking status: Never Smoker    Smokeless tobacco: Never Used    Alcohol use Yes      Comment: socially    Drug use: No    Sexual activity: Yes     Partners: Male     Birth control/ protection: Condom     Other Topics Concern    Not on file     Social History Narrative    No narrative on file       ALLERGIES AND MEDICATIONS: updated and reviewed.  Review of patient's allergies indicates:  No Known Allergies  Current Outpatient Prescriptions   Medication Sig Dispense Refill    dicyclomine (BENTYL) 20 mg tablet Take 1 tablet (20 mg total) by mouth 2 (two) times daily. 60 tablet 3    diphenoxylate-atropine 2.5-0.025 mg (LOMOTIL) 2.5-0.025 mg per tablet Take 1 tablet by mouth 4 (four) times daily as needed for Diarrhea. 120 tablet 3    ondansetron (ZOFRAN-ODT) 4 MG TbDL Take 2 tablets (8 mg total) by mouth every 8 (eight) hours as needed (nausea). 30 tablet 0    pantoprazole  "(PROTONIX) 40 MG tablet Take 1 tablet (40 mg total) by mouth once daily. 30 tablet 1     No current facility-administered medications for this visit.        Review of Systems   Constitutional: Negative for chills and fever.   Respiratory: Negative for cough.    Cardiovascular: Negative for chest pain and palpitations.   Gastrointestinal: Positive for abdominal pain, diarrhea, nausea and vomiting.   Psychiatric/Behavioral: The patient is nervous/anxious and has insomnia.        Physical Exam   Vitals:    10/04/17 0847   BP: 126/86   Pulse: 68   Temp: 98.3 °F (36.8 °C)   SpO2: 99%   Weight: 62.3 kg (137 lb 5.6 oz)   Height: 5' 5" (1.651 m)    Body mass index is 22.86 kg/m².  Weight: 62.3 kg (137 lb 5.6 oz)   Height: 5' 5" (165.1 cm)     Physical Exam   Constitutional: She is oriented to person, place, and time. She appears well-developed and well-nourished. No distress.   Eyes: EOM are normal.   Cardiovascular: Normal rate, regular rhythm and normal heart sounds.    No murmur heard.  Pulmonary/Chest: Effort normal and breath sounds normal.   Musculoskeletal: Normal range of motion. She exhibits no edema.   Neurological: She is alert and oriented to person, place, and time. Coordination normal.   Skin: Skin is warm and dry.   Psychiatric: She has a normal mood and affect. Her behavior is normal. Thought content normal.   No internal stimuli, no pressured speech, no psychomotor retardation or agitation     "

## 2017-10-06 DIAGNOSIS — R11.2 NON-INTRACTABLE VOMITING WITH NAUSEA, UNSPECIFIED VOMITING TYPE: ICD-10-CM

## 2017-10-06 DIAGNOSIS — R10.30 LOWER ABDOMINAL PAIN: ICD-10-CM

## 2017-10-06 RX ORDER — PANTOPRAZOLE SODIUM 40 MG/1
40 TABLET, DELAYED RELEASE ORAL DAILY
Qty: 30 TABLET | Refills: 0 | OUTPATIENT
Start: 2017-10-06

## 2017-10-06 RX ORDER — ONDANSETRON 4 MG/1
TABLET, ORALLY DISINTEGRATING ORAL
Qty: 60 TABLET | Refills: 0 | Status: SHIPPED | OUTPATIENT
Start: 2017-10-06 | End: 2017-12-03 | Stop reason: SDUPTHER

## 2017-10-06 RX ORDER — PANTOPRAZOLE SODIUM 40 MG/1
40 TABLET, DELAYED RELEASE ORAL DAILY
Qty: 30 TABLET | Refills: 0 | Status: ON HOLD | OUTPATIENT
Start: 2017-10-06 | End: 2020-12-04 | Stop reason: HOSPADM

## 2017-10-06 RX ORDER — ONDANSETRON 4 MG/1
TABLET, ORALLY DISINTEGRATING ORAL
Qty: 60 TABLET | Refills: 0 | OUTPATIENT
Start: 2017-10-06

## 2017-10-24 ENCOUNTER — LAB VISIT (OUTPATIENT)
Dept: LAB | Facility: HOSPITAL | Age: 27
End: 2017-10-24
Attending: FAMILY MEDICINE
Payer: COMMERCIAL

## 2017-10-24 ENCOUNTER — OFFICE VISIT (OUTPATIENT)
Dept: FAMILY MEDICINE | Facility: CLINIC | Age: 27
End: 2017-10-24
Payer: COMMERCIAL

## 2017-10-24 VITALS
DIASTOLIC BLOOD PRESSURE: 80 MMHG | BODY MASS INDEX: 22.75 KG/M2 | HEART RATE: 76 BPM | HEIGHT: 65 IN | TEMPERATURE: 98 F | SYSTOLIC BLOOD PRESSURE: 120 MMHG | WEIGHT: 136.56 LBS | OXYGEN SATURATION: 99 %

## 2017-10-24 DIAGNOSIS — Z01.419 WELL WOMAN EXAM WITH ROUTINE GYNECOLOGICAL EXAM: Primary | ICD-10-CM

## 2017-10-24 DIAGNOSIS — Z30.09 COUNSELING FOR BIRTH CONTROL, ORAL CONTRACEPTIVES: ICD-10-CM

## 2017-10-24 DIAGNOSIS — Z11.3 SCREENING EXAMINATION FOR STD (SEXUALLY TRANSMITTED DISEASE): ICD-10-CM

## 2017-10-24 LAB
B-HCG UR QL: NEGATIVE
CTP QC/QA: YES

## 2017-10-24 PROCEDURE — 99999 PR PBB SHADOW E&M-EST. PATIENT-LVL III: CPT | Mod: PBBFAC,,, | Performed by: NURSE PRACTITIONER

## 2017-10-24 PROCEDURE — 36415 COLL VENOUS BLD VENIPUNCTURE: CPT | Mod: PO

## 2017-10-24 PROCEDURE — 87591 N.GONORRHOEAE DNA AMP PROB: CPT

## 2017-10-24 PROCEDURE — 81025 URINE PREGNANCY TEST: CPT | Mod: S$GLB,,, | Performed by: NURSE PRACTITIONER

## 2017-10-24 PROCEDURE — 86703 HIV-1/HIV-2 1 RESULT ANTBDY: CPT

## 2017-10-24 PROCEDURE — 99395 PREV VISIT EST AGE 18-39: CPT | Mod: S$GLB,,, | Performed by: NURSE PRACTITIONER

## 2017-10-24 NOTE — PROGRESS NOTES
Subjective:       Patient ID: Demario Christine is a 27 y.o. female.    Chief Complaint: std health check (hiv test) and Contraception (medication for)    27-year-old female 27-year-old female presents to the clinic today requesting a refill of her birth control pills that she took several years ago.  She also would like to be tested for STDs.  She said she like to be tested for HIV because one of her classmates at school has AIDS.  Her last menstrual period was 10/18/2017.  She states her cycles are regular.  She has mild mid lower abdominal pain for the last couple of days.  She denies any vaginal discharge, vaginal bleeding, dysuria, urinary frequency, hematuria, or flank pain.  She denies any nausea, vomiting, or diarrhea.      Abdominal Pain   This is a chronic problem. The current episode started more than 1 month ago. The onset quality is undetermined. The problem occurs 2 to 4 times per day. The most recent episode lasted 2 months. The problem has been waxing and waning. The pain is located in the generalized abdominal region and left flank. The pain is at a severity of 4/10. The pain is mild. The quality of the pain is aching, cramping and a sensation of fullness. Associated symptoms include anorexia, arthralgias, flatus, headaches, myalgias and weight loss. Pertinent negatives include no diarrhea, dysuria, fever, frequency or nausea. The pain is aggravated by drinking alcohol and eating. The pain is relieved by bowel movements, liquids and passing flatus. She has tried nothing for the symptoms. The treatment provided significant relief. Prior diagnostic workup includes GI consult and ultrasound. Her past medical history is significant for irritable bowel syndrome. There is no history of abdominal surgery, colon cancer, Crohn's disease, gallstones, GERD, pancreatitis, PUD or ulcerative colitis. Patient's medical history does not include kidney stones and UTI.     Past Medical History:   Diagnosis Date     Chronic diarrhea     Migraine headache      Past Surgical History:   Procedure Laterality Date    COLONOSCOPY N/A 5/15/2017    Procedure: COLONOSCOPY;  Surgeon: Errol Hernandez MD;  Location: [facility] ([address] [place] [address]);  Service: Endoscopy;  Laterality: N/A;      reports that she has never smoked. She has never used smokeless tobacco. She reports that she drinks alcohol. She reports that she does not use drugs.  Review of Systems   Constitutional: Positive for weight loss. Negative for chills and fever.   Respiratory: Negative for cough, shortness of breath and wheezing.    Cardiovascular: Negative for chest pain, palpitations and leg swelling.   Gastrointestinal: Positive for abdominal pain, anorexia and flatus. Negative for diarrhea and nausea.   Genitourinary: Negative for difficulty urinating, dysuria, flank pain, frequency, vaginal bleeding, vaginal discharge and vaginal pain.   Musculoskeletal: Positive for arthralgias and myalgias.   Neurological: Positive for headaches. Negative for dizziness and light-headedness.       Objective:      Physical Exam   Constitutional: She is oriented to person, place, and time. She appears well-developed and well-nourished. No distress.   Eyes: Conjunctivae and EOM are normal. Pupils are equal, round, and reactive to light. Right eye exhibits no discharge. Left eye exhibits no discharge. No scleral icterus.   Cardiovascular: Normal rate, regular rhythm and normal heart sounds.  Exam reveals no gallop and no friction rub.    No murmur heard.  Pulmonary/Chest: Effort normal and breath sounds normal. No respiratory distress. She has no wheezes. She has no rales.   Abdominal: Soft. Bowel sounds are normal. There is no tenderness. There is no rebound and no guarding.   Genitourinary: Vagina normal. No vaginal discharge found.   Genitourinary Comments: Vaginal exam cervix appears normal no discharge No CMT noted No pain, masses or tenderness over adnexa   Breast symmetrical no palpable  masses, tenderness or nipple discharge no axilla adenopathy    Musculoskeletal: Normal range of motion. She exhibits no edema.   Neurological: She is alert and oriented to person, place, and time.   Skin: Skin is warm and dry. She is not diaphoretic.   Psychiatric: She has a normal mood and affect.       Assessment:       1. Well woman exam with routine gynecological exam    2. Counseling for birth control, oral contraceptives    3. Screening examination for STD (sexually transmitted disease)        Plan:         Well woman exam with routine gynecological exam    Counseling for birth control, oral contraceptives  -     POCT urine pregnancy  -     norgestrel-ethinyl estradiol (LO/OVRAL) 0.3-30 mg-mcg per tablet; Take 1 tablet by mouth once daily.  Dispense: 30 tablet; Refill: 11  - UPT negative     Screening examination for STD (sexually transmitted disease)  -     C. trachomatis/N. gonorrhoeae by AMP DNA Urine  -     HIV 1 / 2 ANTIBODY; Future; Expected date: 11/07/2017

## 2017-10-25 ENCOUNTER — TELEPHONE (OUTPATIENT)
Dept: FAMILY MEDICINE | Facility: CLINIC | Age: 27
End: 2017-10-25

## 2017-10-25 LAB
C TRACH DNA SPEC QL NAA+PROBE: NOT DETECTED
HIV 1+2 AB+HIV1 P24 AG SERPL QL IA: NEGATIVE
N GONORRHOEA DNA SPEC QL NAA+PROBE: NOT DETECTED

## 2017-10-25 NOTE — TELEPHONE ENCOUNTER
I spoke with the patient and explained that her GC/STEVE test were both normal. Her HIV is still pending. Patient verbalized understanding of above.

## 2017-10-30 DIAGNOSIS — K59.1 FUNCTIONAL DIARRHEA: ICD-10-CM

## 2017-10-30 RX ORDER — DIPHENOXYLATE HYDROCHLORIDE AND ATROPINE SULFATE 2.5; .025 MG/1; MG/1
1 TABLET ORAL 4 TIMES DAILY PRN
Qty: 120 TABLET | Refills: 3 | Status: SHIPPED | OUTPATIENT
Start: 2017-10-30 | End: 2019-01-11

## 2017-11-13 ENCOUNTER — PATIENT MESSAGE (OUTPATIENT)
Dept: FAMILY MEDICINE | Facility: CLINIC | Age: 27
End: 2017-11-13

## 2017-11-14 ENCOUNTER — OFFICE VISIT (OUTPATIENT)
Dept: FAMILY MEDICINE | Facility: CLINIC | Age: 27
End: 2017-11-14
Payer: COMMERCIAL

## 2017-11-14 ENCOUNTER — LAB VISIT (OUTPATIENT)
Dept: LAB | Facility: HOSPITAL | Age: 27
End: 2017-11-14
Payer: COMMERCIAL

## 2017-11-14 VITALS
BODY MASS INDEX: 23.12 KG/M2 | WEIGHT: 138.75 LBS | HEART RATE: 115 BPM | DIASTOLIC BLOOD PRESSURE: 80 MMHG | HEIGHT: 65 IN | OXYGEN SATURATION: 98 % | TEMPERATURE: 98 F | SYSTOLIC BLOOD PRESSURE: 136 MMHG

## 2017-11-14 DIAGNOSIS — Z20.2 STD EXPOSURE: ICD-10-CM

## 2017-11-14 DIAGNOSIS — Z20.2 STD EXPOSURE: Primary | ICD-10-CM

## 2017-11-14 PROCEDURE — 99213 OFFICE O/P EST LOW 20 MIN: CPT | Mod: 25,S$GLB,, | Performed by: NURSE PRACTITIONER

## 2017-11-14 PROCEDURE — 36415 COLL VENOUS BLD VENIPUNCTURE: CPT | Mod: PO

## 2017-11-14 PROCEDURE — 87591 N.GONORRHOEAE DNA AMP PROB: CPT

## 2017-11-14 PROCEDURE — 86592 SYPHILIS TEST NON-TREP QUAL: CPT

## 2017-11-14 PROCEDURE — 96372 THER/PROPH/DIAG INJ SC/IM: CPT | Mod: S$GLB,,, | Performed by: FAMILY MEDICINE

## 2017-11-14 PROCEDURE — 99999 PR PBB SHADOW E&M-EST. PATIENT-LVL IV: CPT | Mod: PBBFAC,,, | Performed by: NURSE PRACTITIONER

## 2017-11-14 PROCEDURE — 80074 ACUTE HEPATITIS PANEL: CPT

## 2017-11-14 RX ORDER — AZITHROMYCIN 1 G/1
1 POWDER, FOR SUSPENSION ORAL ONCE
Qty: 1 PACKET | Refills: 0 | Status: SHIPPED | OUTPATIENT
Start: 2017-11-14 | End: 2017-11-14

## 2017-11-14 RX ORDER — CEFTRIAXONE 250 MG/1
250 INJECTION, POWDER, FOR SOLUTION INTRAMUSCULAR; INTRAVENOUS
Status: COMPLETED | OUTPATIENT
Start: 2017-11-14 | End: 2017-11-14

## 2017-11-14 RX ADMIN — CEFTRIAXONE 250 MG: 250 INJECTION, POWDER, FOR SOLUTION INTRAMUSCULAR; INTRAVENOUS at 02:11

## 2017-11-14 NOTE — PROGRESS NOTES
"Subjective:       Patient ID: Demario Christine is a 27 y.o. female.    Chief Complaint: Exposure to STD    Vaginal Discharge   The patient's primary symptoms include vaginal discharge. The patient's pertinent negatives include no genital itching, genital odor, genital rash or vaginal bleeding. The current episode started 1 to 4 weeks ago. Associated symptoms include a fever and headaches. The vaginal discharge was green and thick. There has been no bleeding. She is sexually active. She uses oral contraceptives for contraception. Her menstrual history has been regular.       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      Social History Main Topics    Smoking status: Never Smoker    Smokeless tobacco: Never Used    Alcohol use Yes      Comment: socially    Drug use: No    Sexual activity: Yes     Partners: Male     Birth control/ protection: Condom     Other Topics Concern    Not on file     Social History Narrative    No narrative on file       Past Surgical History:   Procedure Laterality Date    COLONOSCOPY N/A 5/15/2017    Procedure: COLONOSCOPY;  Surgeon: Errol Hernandez MD;  Location: [address];  Service: Endoscopy;  Laterality: N/A;       Review of Systems   Constitutional: Positive for fever.   Genitourinary: Positive for vaginal discharge. Negative for genital sores, vaginal bleeding and vaginal pain.   Neurological: Positive for headaches.   All other systems reviewed and are negative.      Objective:   /80 (BP Location: Right arm, Patient Position: Sitting, BP Method: Medium (Manual))   Pulse (!) 115   Temp 98 °F (36.7 °C) (Oral)   Ht 5' 5" (1.651 m)   Wt 62.9 kg (138 lb 12.5 " oz)   LMP 10/18/2017   SpO2 98%   BMI 23.09 kg/m²      Physical Exam   Constitutional: She is oriented to person, place, and time. She appears well-developed and well-nourished.   Pulmonary/Chest: No respiratory distress. She has no decreased breath sounds.   Neurological: She is alert and oriented to person, place, and time.   Skin: She is not diaphoretic. No pallor.       Assessment:       1. STD exposure        Plan:       Demario was seen today for exposure to std.    Diagnoses and all orders for this visit:    STD exposure  -     C. trachomatis/N. gonorrhoeae by AMP DNA Urine  -     Hepatitis panel, acute; Future  -     RPR; Future    Other orders  -     azithromycin (ZITHROMAX) 1 gram Pack; Take 1 g by mouth once.  -     cefTRIAXone injection 250 mg; Inject 250 mg into the muscle one time.        Return if symptoms worsen or fail to improve.

## 2017-11-14 NOTE — PATIENT INSTRUCTIONS
Chlamydia  Chlamydia is a very common sexually transmitted disease (STD). Most people do not have symptoms. Because of this, chlamydia may not be noticed until it causes severe problems. Left untreated, this infection can cause women and men to become sterile. This means they will not be able to have children.    Symptoms  Many people with chlamydia have no symptoms. Women are more likely than men not to have symptoms.  If symptoms show up in women, they include:  · Abnormal vaginal discharge  · Bleeding between periods  · Pain or burning during urination  If symptoms show up in men, they include:  · Clear discharge (drip) from the penis or anus  · Pain or burning during urination  These symptoms usually disappear after a few weeks, whether or not you are treated. However, if you are not treated, the chlamydia will still be present and can cause long-term problems.  Potential problems  If the infection is not treated, it can lead to more serious health problems. In women, this can be pelvic inflammatory disease (PID). PID can make a woman sterile. It can also cause an ectopic (tubal) pregnancy. This type of pregnancy cannot be carried to term. Symptoms of PID include fever, pain during sex, and pain in the belly.  Sexually active women should get checked for chlamydia regularly. This can help prevent PID.   Treatment  When found early, chlamydia can be treated. It can be cured with antibiotic medicines. If you have it, tell your partner right away. Because women often dont have symptoms, men should ask their partners to get tested.  Prevention  Know your partners history. Protect yourself by using a latex condom whenever you have sex. If you are pregnant, take extra care to get proper treatment. Untreated chlamydia in a pregnant woman can pass the infection on to the baby, causing possible eye, ear, or lung problems. There is also the possibility of a premature delivery.  Resources  American [employer] Health  Association STD Hotline  577.905.5754  www.ashastd.org  Centers for Disease Control and Prevention  895.689.7401  www.cdc.gov/std   Date Last Reviewed: 12/1/2016 © 2000-2017 [employer]. [address] [place] [address]. All rights reserved. This information is not intended as a substitute for professional medical care. Always follow your healthcare professional's instructions.        Gonorrhea (Female, Treated)    You have an infection called gonorrhea. This infection is a sexually transmitted disease (STD). It is highly contagious. It is passed by sexual contact with an infected partner. Gonorrhea can infect the internal sex organs (cervix, uterus, or fallopian tubes). Less often, it can infect the mouth, throat, and anus. In rare instances, it can infect the joints, eyes, and other areas of the body.  Women with infections in the cervix may have no symptoms or only mild symptoms early in the illness. Therefore, it is possible to pass on this infection without knowing you have it.  When symptoms do occur in a woman, they usually start 2 to 10 days after exposure. There may be a thick vaginal discharge and pain or burning when urinating. If the infection spreads to the fallopian tubes, it is called pelvic inflammatory disease (PID). This causes lower abdominal pain and fever. If not treated, gonorrhea can cause infertility (being unable to have children) by scarring the fallopian tubes. PID also increases the risk of having a pregnancy outside the uterus (ectopic pregnancy) in the future.  Gonorrhea can be treated and cured. Treatment is with antibiotic medicine.  Home care  · Any sexual partner you have had within the last 2 months needs to be treated, even if there are no symptoms. Your partner should contact his or her healthcare provider. Or he or she can go to an urgent care clinic or the public health department to be examined and treated.  · Avoid sexual activity until both you and  your partner have completed all antibiotic medicine and you have been told by your healthcare provider that you are no longer contagious.  · It is important to take all medicine as directed until it is finished. If not, the illness may recur.  · Learn about safer sex practices and use these in the future. The safest sex is with a partner who has tested negative and only has sex with you. Latex barriers such as condoms offer protection from spreading some sexually transmitted diseases, including gonorrhea, chlamydia, and HIV, but they are not a guarantee.  Follow-up care  Follow up with your healthcare provider, or as advised.  If tests were done, call as directed for results. A follow-up test should be done 4 to 6 weeks after treatment, to be sure the infection has cleared. Follow up with your healthcare provider or the public health department for complete STD screening, including HIV testing. For more information about STDs, go to www.cdc.gov/std or call CDC-Info: 467.335.5173.  When to seek medical advice  Call your healthcare provider right away if any of these occur:  · No improvement after 3 days of treatment  · New or increasing lower abdominal pain or back pain  · Unexpected vaginal bleeding  · Weakness, dizziness, or fainting  · Repeated vomiting  · Inability to urinate due to pain  · Rash or joint pain  · Painful sores on the outer vaginal area  · Enlarged, painful lymph nodes (lumps) in the groin  · Fever of 100.4ºF (38ºC) or higher  Date Last Reviewed: 9/25/2015 © 2000-2017 [employer]. [address] [place] [address], [place] [address]. All rights reserved. This information is not intended as a substitute for professional medical care. Always follow your healthcare professional's instructions.

## 2017-11-15 LAB
C TRACH DNA SPEC QL NAA+PROBE: NOT DETECTED
HAV IGM SERPL QL IA: NEGATIVE
HBV CORE IGM SERPL QL IA: NEGATIVE
HBV SURFACE AG SERPL QL IA: NEGATIVE
HCV AB SERPL QL IA: NEGATIVE
N GONORRHOEA DNA SPEC QL NAA+PROBE: DETECTED
RPR SER QL: NORMAL

## 2017-12-03 DIAGNOSIS — R11.2 NON-INTRACTABLE VOMITING WITH NAUSEA, UNSPECIFIED VOMITING TYPE: ICD-10-CM

## 2017-12-04 RX ORDER — ONDANSETRON 4 MG/1
TABLET, ORALLY DISINTEGRATING ORAL
Qty: 60 TABLET | Refills: 0 | Status: SHIPPED | OUTPATIENT
Start: 2017-12-04 | End: 2018-07-20 | Stop reason: SDUPTHER

## 2018-01-26 DIAGNOSIS — F41.9 ANXIETY: ICD-10-CM

## 2018-01-26 RX ORDER — SERTRALINE HYDROCHLORIDE 50 MG/1
50 TABLET, FILM COATED ORAL DAILY
Qty: 30 TABLET | Refills: 11 | Status: SHIPPED | OUTPATIENT
Start: 2018-01-26 | End: 2018-01-31 | Stop reason: SDUPTHER

## 2018-01-31 ENCOUNTER — PATIENT MESSAGE (OUTPATIENT)
Dept: FAMILY MEDICINE | Facility: CLINIC | Age: 28
End: 2018-01-31

## 2018-01-31 DIAGNOSIS — F41.9 ANXIETY: ICD-10-CM

## 2018-01-31 RX ORDER — SERTRALINE HYDROCHLORIDE 100 MG/1
100 TABLET, FILM COATED ORAL DAILY
Qty: 30 TABLET | Refills: 11 | Status: SHIPPED | OUTPATIENT
Start: 2018-01-31 | End: 2019-08-21 | Stop reason: SDUPTHER

## 2018-03-06 ENCOUNTER — OFFICE VISIT (OUTPATIENT)
Dept: FAMILY MEDICINE | Facility: CLINIC | Age: 28
End: 2018-03-06
Payer: COMMERCIAL

## 2018-03-06 VITALS
HEIGHT: 65 IN | HEART RATE: 82 BPM | TEMPERATURE: 98 F | OXYGEN SATURATION: 99 % | SYSTOLIC BLOOD PRESSURE: 118 MMHG | DIASTOLIC BLOOD PRESSURE: 76 MMHG | WEIGHT: 144.31 LBS | BODY MASS INDEX: 24.04 KG/M2

## 2018-03-06 DIAGNOSIS — R11.2 NAUSEA AND VOMITING, INTRACTABILITY OF VOMITING NOT SPECIFIED, UNSPECIFIED VOMITING TYPE: ICD-10-CM

## 2018-03-06 DIAGNOSIS — R10.84 GENERALIZED ABDOMINAL PAIN: Primary | ICD-10-CM

## 2018-03-06 DIAGNOSIS — F41.9 ANXIETY: ICD-10-CM

## 2018-03-06 PROCEDURE — 99212 OFFICE O/P EST SF 10 MIN: CPT | Mod: S$GLB,,, | Performed by: NURSE PRACTITIONER

## 2018-03-06 PROCEDURE — 99999 PR PBB SHADOW E&M-EST. PATIENT-LVL III: CPT | Mod: PBBFAC,,, | Performed by: NURSE PRACTITIONER

## 2018-03-06 NOTE — PROGRESS NOTES
Patient referred to the ER. She has intractable vomiting and shaking. She reports feeling anxious all day. She tried to take zofran this morning, but reports vomiting. She does not take protonix and her zoloft was recently increased to 100 mg daily. She reports being really stressed and has been dealing with depression on and off since age 14. She reports daily marijuana usage, will last time smoking being yesterday. She reports chest tightness and shortness of breath. Palpitations on and off. She has not followed up with GI and reports not having the EGD done because it makes her anxious, so it has been rescheduled 3 times. She reports severe abdominal pain and has been having some diarrhea. Will refer to ER for further evaluation.

## 2018-04-03 DIAGNOSIS — R11.2 NON-INTRACTABLE VOMITING WITH NAUSEA, UNSPECIFIED VOMITING TYPE: ICD-10-CM

## 2018-04-03 RX ORDER — ONDANSETRON 4 MG/1
TABLET, ORALLY DISINTEGRATING ORAL
Qty: 60 TABLET | Refills: 0 | OUTPATIENT
Start: 2018-04-03

## 2018-06-12 ENCOUNTER — PATIENT MESSAGE (OUTPATIENT)
Dept: FAMILY MEDICINE | Facility: CLINIC | Age: 28
End: 2018-06-12

## 2018-06-12 DIAGNOSIS — R11.2 NON-INTRACTABLE VOMITING WITH NAUSEA, UNSPECIFIED VOMITING TYPE: ICD-10-CM

## 2018-06-13 RX ORDER — ONDANSETRON 4 MG/1
TABLET, ORALLY DISINTEGRATING ORAL
Qty: 60 TABLET | Refills: 0 | OUTPATIENT
Start: 2018-06-13

## 2018-07-20 DIAGNOSIS — R11.2 NON-INTRACTABLE VOMITING WITH NAUSEA, UNSPECIFIED VOMITING TYPE: ICD-10-CM

## 2018-07-20 RX ORDER — ONDANSETRON 4 MG/1
4 TABLET, ORALLY DISINTEGRATING ORAL EVERY 8 HOURS PRN
Qty: 28 TABLET | Refills: 0 | Status: SHIPPED | OUTPATIENT
Start: 2018-07-20 | End: 2018-07-27

## 2018-09-13 ENCOUNTER — OFFICE VISIT (OUTPATIENT)
Dept: FAMILY MEDICINE | Facility: CLINIC | Age: 28
End: 2018-09-13
Payer: COMMERCIAL

## 2018-09-13 VITALS
TEMPERATURE: 98 F | OXYGEN SATURATION: 98 % | SYSTOLIC BLOOD PRESSURE: 120 MMHG | HEART RATE: 78 BPM | HEIGHT: 65 IN | BODY MASS INDEX: 24.84 KG/M2 | WEIGHT: 149.13 LBS | DIASTOLIC BLOOD PRESSURE: 86 MMHG

## 2018-09-13 DIAGNOSIS — N89.8 VAGINAL DISCHARGE: Primary | ICD-10-CM

## 2018-09-13 DIAGNOSIS — R10.9 ABDOMINAL PAIN, UNSPECIFIED ABDOMINAL LOCATION: ICD-10-CM

## 2018-09-13 DIAGNOSIS — R05.9 COUGH: ICD-10-CM

## 2018-09-13 DIAGNOSIS — F41.8 ANXIETY WITH DEPRESSION: ICD-10-CM

## 2018-09-13 PROCEDURE — 3008F BODY MASS INDEX DOCD: CPT | Mod: CPTII,S$GLB,, | Performed by: NURSE PRACTITIONER

## 2018-09-13 PROCEDURE — 87660 TRICHOMONAS VAGIN DIR PROBE: CPT

## 2018-09-13 PROCEDURE — 87491 CHLMYD TRACH DNA AMP PROBE: CPT

## 2018-09-13 PROCEDURE — 99214 OFFICE O/P EST MOD 30 MIN: CPT | Mod: S$GLB,,, | Performed by: NURSE PRACTITIONER

## 2018-09-13 PROCEDURE — 99999 PR PBB SHADOW E&M-EST. PATIENT-LVL IV: CPT | Mod: PBBFAC,,, | Performed by: NURSE PRACTITIONER

## 2018-09-13 RX ORDER — BENZONATATE 200 MG/1
200 CAPSULE ORAL 3 TIMES DAILY PRN
Qty: 30 CAPSULE | Refills: 0 | Status: SHIPPED | OUTPATIENT
Start: 2018-09-13 | End: 2018-09-23

## 2018-09-13 NOTE — PROGRESS NOTES
Subjective:       Patient ID: Demario Christine is a 28 y.o. female.    Chief Complaint: URI (dry cough and sore throat X 2 weeks); Vaginal Itching (states STD possibly ,X 1week); and Abdominal Pain (pt states constant sharp pain )    Vaginal Discharge   The patient's primary symptoms include genital itching and vaginal discharge. The patient's pertinent negatives include no genital lesions, genital odor, genital rash, pelvic pain or vaginal bleeding. This is a new problem. The current episode started 1 to 4 weeks ago. The problem occurs intermittently. The problem has been unchanged. The patient is experiencing no pain. Associated symptoms include diarrhea, discolored urine, frequency, nausea and a sore throat. Pertinent negatives include no back pain, chills, constipation, fever, flank pain, hematuria or vomiting. Nothing aggravates the symptoms. She has tried antifungals (monastat cream; caused vagina to burn worst) for the symptoms. The treatment provided no relief. She is sexually active. She uses nothing for contraception. Her menstrual history has been regular.   URI    This is a new problem. The current episode started 1 to 4 weeks ago. The problem has been unchanged. Associated symptoms include coughing, diarrhea, nausea and a sore throat. Pertinent negatives include no chest pain, congestion, ear pain, rhinorrhea, sinus pain, sneezing or vomiting. Treatments tried: halls. The treatment provided no relief.       Past Medical History:   Diagnosis Date    Chronic diarrhea     Migraine headache        Social History     Socioeconomic History    Marital status: Single     Spouse name: Not on file    Number of children: Not on file    Years of education: Not on file    Highest education level: Not on file   Social Needs    Financial resource strain: Not on file    Food insecurity - worry: Not on file    Food insecurity - inability: Not on file    Transportation needs - medical: Not on file     "Transportation needs - non-medical: Not on file   Occupational History    Occupation:      Employer: [employer]    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   Tobacco Use    Smoking status: Never Smoker    Smokeless tobacco: Never Used   Substance and Sexual Activity    Alcohol use: Yes     Comment: socially    Drug use: No    Sexual activity: Yes     Partners: Male     Birth control/protection: Condom   Other Topics Concern    Not on file   Social History Narrative    Not on file       Past Surgical History:   Procedure Laterality Date    COLONOSCOPY N/A 5/15/2017    Procedure: COLONOSCOPY;  Surgeon: Errol Hernandez MD;  Location: [facility] ([address] [place] [address]);  Service: Endoscopy;  Laterality: N/A;    COLONOSCOPY N/A 5/15/2017    [facility]    ESOPHAGOGASTRODUODENOSCOPY (EGD) N/A 5/15/2017    [facility]       Review of Systems   Constitutional: Negative for chills and fever.   HENT: Positive for sore throat. Negative for congestion, ear discharge, ear pain, rhinorrhea, sinus pressure, sinus pain, sneezing, tinnitus and trouble swallowing.    Respiratory: Positive for cough. Negative for chest tightness.    Cardiovascular: Negative for chest pain.   Gastrointestinal: Positive for diarrhea and nausea. Negative for constipation and vomiting.   Genitourinary: Positive for frequency and vaginal discharge. Negative for flank pain, hematuria and pelvic pain.   Musculoskeletal: Negative for back pain.   All other systems reviewed and are negative.      Objective:   /86 (BP Location: Right arm, Patient Position: Sitting, BP Method: Medium (Manual))   Pulse 78   Temp 98.2 °F (36.8 °C) (Oral)   Ht 5' 5" (1.651 m)   Wt 67.7 kg (149 lb 2.3 oz)   LMP 08/18/2018   SpO2 98%   BMI 24.82 kg/m²      Physical Exam   Constitutional: She is oriented to person, place, and time. She " appears well-developed and well-nourished.   HENT:   Head: Normocephalic and atraumatic.   Right Ear: Hearing and tympanic membrane normal. No middle ear effusion.   Left Ear: Hearing and tympanic membrane normal.  No middle ear effusion.   Nose: Nose normal. Right sinus exhibits no maxillary sinus tenderness and no frontal sinus tenderness. Left sinus exhibits no maxillary sinus tenderness and no frontal sinus tenderness.   Mouth/Throat: Uvula is midline, oropharynx is clear and moist and mucous membranes are normal.   Eyes: Pupils are equal, round, and reactive to light.   Cardiovascular: Normal rate, regular rhythm, normal heart sounds and normal pulses.   Pulmonary/Chest: Effort normal and breath sounds normal. No respiratory distress. She has no decreased breath sounds. She has no wheezes. She has no rhonchi. She has no rales.   Abdominal: Soft. Normal appearance and bowel sounds are normal. There is no tenderness.   Genitourinary: No erythema, tenderness or bleeding in the vagina. No signs of injury around the vagina. Vaginal discharge found.   Neurological: She is alert and oriented to person, place, and time. She has normal strength.   Skin: Skin is warm, dry and intact.   Psychiatric: She has a normal mood and affect. Her speech is normal and behavior is normal.       Assessment:       1. Vaginal discharge    2. Cough    3. Anxiety with depression    4. Abdominal pain, unspecified abdominal location        Plan:       Demario was seen today for uri, vaginal itching and abdominal pain.    Diagnoses and all orders for this visit:    Vaginal discharge  -     Vaginosis Screen by DNA Probe  -     C. trachomatis/N. gonorrhoeae by AMP DNA    Cough  -     benzonatate (TESSALON) 200 MG capsule; Take 1 capsule (200 mg total) by mouth 3 (three) times daily as needed.    Anxiety with depression  -     Information provided for LCSW.    Abdominal pain, unspecified abdominal location  -     She is to follow up with  GI.      Cough likely related to GERD/Anxiety. She will decide if she would like to get scoped. She will make a follow up appt with GI. Will follow up after results available. Provided her with information for LCSW. Discussed coping mechanisms with patient.     Follow-up if symptoms worsen or fail to improve.

## 2018-09-14 LAB
C TRACH DNA SPEC QL NAA+PROBE: NOT DETECTED
CANDIDA RRNA VAG QL PROBE: NEGATIVE
G VAGINALIS RRNA GENITAL QL PROBE: NEGATIVE
N GONORRHOEA DNA SPEC QL NAA+PROBE: NOT DETECTED
T VAGINALIS RRNA GENITAL QL PROBE: NEGATIVE

## 2018-11-29 ENCOUNTER — OFFICE VISIT (OUTPATIENT)
Dept: OPTOMETRY | Facility: CLINIC | Age: 28
End: 2018-11-29
Payer: COMMERCIAL

## 2018-11-29 DIAGNOSIS — H00.11 CHALAZION OF RIGHT UPPER EYELID: ICD-10-CM

## 2018-11-29 DIAGNOSIS — H52.7 REFRACTIVE ERROR: ICD-10-CM

## 2018-11-29 DIAGNOSIS — Z01.00 EXAMINATION OF EYES AND VISION: Primary | ICD-10-CM

## 2018-11-29 PROCEDURE — 92014 COMPRE OPH EXAM EST PT 1/>: CPT | Mod: S$GLB,,, | Performed by: OPTOMETRIST

## 2018-11-29 PROCEDURE — 99999 PR PBB SHADOW E&M-EST. PATIENT-LVL II: CPT | Mod: PBBFAC,,, | Performed by: OPTOMETRIST

## 2018-11-29 PROCEDURE — 92015 DETERMINE REFRACTIVE STATE: CPT | Mod: S$GLB,,, | Performed by: OPTOMETRIST

## 2018-11-29 NOTE — PROGRESS NOTES
Subjective:       Patient ID: Demario Christine is a 28 y.o. female      Chief Complaint   Patient presents with    Concerns About Ocular Health     History of Present Illness  Dls: 4/24/17 Dr. William     27 y/o female presents today for ocular health check.  Pt c/o bump on RUL x 3-4 months no pain no itching.   Pt states no changes in vision.     No tearing  No itching   No burning  No pain  + ha's  No floaters  No flashes     Eye meds  None    Assessment/Plan:     1. Examination of eyes and vision  Annual DFE    2. Chalazion of right upper eyelid  Referral to Dr. Harman for excision.   - Ambulatory referral to Ophthalmology    3. Refractive error  Educated patient on refractive error and discussed lens options. Dispensed updated spectacle Rx. Educated about adaptation period to new specs.    Eyeglass Final Rx     Eyeglass Final Rx       Sphere Cylinder Axis    Right -1.25 +5.00 085    Left -1.00 +4.00 110    Type:  \[facility] [place] [facility]\""    Expiration Date:  11/30/2019                Follow-up in about 1 year (around 11/29/2019).

## 2019-01-11 ENCOUNTER — PROCEDURE VISIT (OUTPATIENT)
Dept: OPHTHALMOLOGY | Facility: CLINIC | Age: 29
End: 2019-01-11
Payer: COMMERCIAL

## 2019-01-11 VITALS — DIASTOLIC BLOOD PRESSURE: 67 MMHG | HEART RATE: 91 BPM | SYSTOLIC BLOOD PRESSURE: 113 MMHG

## 2019-01-11 DIAGNOSIS — H00.11 CHALAZION OF RIGHT UPPER EYELID: Primary | ICD-10-CM

## 2019-01-11 PROCEDURE — 67800 REMOVE EYELID LESION: CPT | Mod: E3,S$GLB,, | Performed by: OPHTHALMOLOGY

## 2019-01-11 PROCEDURE — 92012 INTRM OPH EXAM EST PATIENT: CPT | Mod: 25,S$GLB,, | Performed by: OPHTHALMOLOGY

## 2019-01-11 PROCEDURE — 92012 PR EYE EXAM, EST PATIENT,INTERMED: ICD-10-PCS | Mod: 25,S$GLB,, | Performed by: OPHTHALMOLOGY

## 2019-01-11 PROCEDURE — 67800 PR EXCIS CHALAZION,SINGLE: ICD-10-PCS | Mod: E3,S$GLB,, | Performed by: OPHTHALMOLOGY

## 2019-01-11 NOTE — PROGRESS NOTES
Subjective:       Patient ID: Demario Christine is a 28 y.o. female.    Chief Complaint: Chalazion    HPI     28 y.o. Female is here for Chalazion Excision. Pt states that she had   styes in the past that went away. Notice Chalazion 5 to 6 months. Used   warm compress. Chalazion has decreased in size. No blurred vision. When   washing face can feel Chalazion on the eyeball. 4 months after rubbing the   eye some discharge came from it.     Eye Meds: No gtt     Last edited by DAYSI Drummond on 1/11/2019  3:59 PM. (History)             Assessment:       1. Chalazion of right upper eyelid        Plan:       RUL chalazion-Pt wants it removed today.         RUL chalazion excision today.  Maxitrol zonia bid-tid OD x 5 days.  RTC me prn.      Procedure note: 2% xylocaine injected into RUL. Betadine prep to RUL/RLL. Chalazion clamp placed onto RUL & lid was everted. Vertical incision made into palpebral conj. Curette & Q-tips were used to remove lesion. Cautery used for hemostasis & Maxitrol zonia applied OD. Pt tolerated procedure well.

## 2019-04-04 NOTE — TELEPHONE ENCOUNTER
Patient requesting zofran for nausea.  Short prescription given.  Needs appointment in clinic as soon as possible, [facility] okay.   Subjective:      Heiro Her is a 3 y.o. male here with mother. Patient brought in for Need clearance for dental procedure      History of Present Illness:  HPI   Pt seen by dentist with severe multiple caries and having dental procedure later this month.  Denies uri symptoms or cough and pt has been well without any other symptoms. Has never had anaesthesia and no family history of adverse reactions during surgeries.    Pt also has continued with significant language delay, still not using sentences.  Mom has not been able to see developmental pediatrician due to moving to [place].     Review of Systems   Constitutional: Negative for appetite change, fatigue and fever.   HENT: Negative for congestion, ear pain, rhinorrhea and sore throat.    Eyes: Negative for discharge and redness.   Gastrointestinal: Negative for blood in stool, constipation, diarrhea, nausea and vomiting.   Genitourinary: Negative for decreased urine volume and dysuria.   Musculoskeletal: Negative for arthralgias and myalgias.   Skin: Negative for rash.       Objective:     Physical Exam   Constitutional: He is active.   Very anxious with exam   HENT:   Head: Normocephalic.   Right Ear: Tympanic membrane, external ear, pinna and canal normal.   Left Ear: Tympanic membrane, external ear, pinna and canal normal.   Nose: Nose normal.   Mouth/Throat: Mucous membranes are moist. No oral lesions. Dental caries (signficant dental decay/caries) present. Oropharynx is clear.   Eyes: Pupils are equal, round, and reactive to light. Conjunctivae are normal.   Neck: Normal range of motion. Neck supple. No neck adenopathy.   Cardiovascular: Normal rate, regular rhythm, S1 normal and S2 normal. Pulses are strong.   No murmur heard.  Pulmonary/Chest: Effort normal and breath sounds normal. No respiratory distress.   Abdominal: Soft. Bowel sounds are normal. He exhibits no distension and no mass. There is no tenderness.   Neurological: He is alert.   Skin:  Skin is warm. No rash noted.   Nursing note and vitals reviewed.      Assessment:        1. Pre-operative clearance    2. Expressive language delay    3. Delayed social and emotional development         Plan:       Cristela was seen today for need clearance for dental procedure.    Diagnoses and all orders for this visit:    Pre-operative clearance    Expressive language delay  -     Ambulatory consult to Child development    Delayed social and emotional development  -     Ambulatory consult to Child development      Cleared for procedure

## 2019-07-30 ENCOUNTER — OFFICE VISIT (OUTPATIENT)
Dept: FAMILY MEDICINE | Facility: CLINIC | Age: 29
End: 2019-07-30
Payer: COMMERCIAL

## 2019-07-30 ENCOUNTER — LAB VISIT (OUTPATIENT)
Dept: LAB | Facility: HOSPITAL | Age: 29
End: 2019-07-30
Payer: COMMERCIAL

## 2019-07-30 VITALS — SYSTOLIC BLOOD PRESSURE: 128 MMHG | DIASTOLIC BLOOD PRESSURE: 80 MMHG

## 2019-07-30 DIAGNOSIS — Z01.419 WELL WOMAN EXAM WITH ROUTINE GYNECOLOGICAL EXAM: Primary | ICD-10-CM

## 2019-07-30 DIAGNOSIS — Z11.3 SCREEN FOR STD (SEXUALLY TRANSMITTED DISEASE): ICD-10-CM

## 2019-07-30 DIAGNOSIS — Z01.419 WELL WOMAN EXAM WITH ROUTINE GYNECOLOGICAL EXAM: ICD-10-CM

## 2019-07-30 DIAGNOSIS — Z23 NEED FOR TDAP VACCINATION: ICD-10-CM

## 2019-07-30 LAB
ALBUMIN SERPL BCP-MCNC: 3.9 G/DL (ref 3.5–5.2)
ALP SERPL-CCNC: 74 U/L (ref 55–135)
ALT SERPL W/O P-5'-P-CCNC: 10 U/L (ref 10–44)
ANION GAP SERPL CALC-SCNC: 6 MMOL/L (ref 8–16)
AST SERPL-CCNC: 17 U/L (ref 10–40)
BASOPHILS # BLD AUTO: 0.05 K/UL (ref 0–0.2)
BASOPHILS NFR BLD: 1.2 % (ref 0–1.9)
BILIRUB SERPL-MCNC: 0.4 MG/DL (ref 0.1–1)
BUN SERPL-MCNC: 7 MG/DL (ref 6–20)
CALCIUM SERPL-MCNC: 9.9 MG/DL (ref 8.7–10.5)
CHLORIDE SERPL-SCNC: 104 MMOL/L (ref 95–110)
CHOLEST SERPL-MCNC: 141 MG/DL (ref 120–199)
CHOLEST/HDLC SERPL: 2 {RATIO} (ref 2–5)
CO2 SERPL-SCNC: 28 MMOL/L (ref 23–29)
CREAT SERPL-MCNC: 0.8 MG/DL (ref 0.5–1.4)
DIFFERENTIAL METHOD: NORMAL
EOSINOPHIL # BLD AUTO: 0 K/UL (ref 0–0.5)
EOSINOPHIL NFR BLD: 0.7 % (ref 0–8)
ERYTHROCYTE [DISTWIDTH] IN BLOOD BY AUTOMATED COUNT: 12.1 % (ref 11.5–14.5)
EST. GFR  (AFRICAN AMERICAN): >60 ML/MIN/1.73 M^2
EST. GFR  (NON AFRICAN AMERICAN): >60 ML/MIN/1.73 M^2
ESTIMATED AVG GLUCOSE: 91 MG/DL (ref 68–131)
GLUCOSE SERPL-MCNC: 72 MG/DL (ref 70–110)
HBA1C MFR BLD HPLC: 4.8 % (ref 4–5.6)
HCT VFR BLD AUTO: 39.6 % (ref 37–48.5)
HDLC SERPL-MCNC: 71 MG/DL (ref 40–75)
HDLC SERPL: 50.4 % (ref 20–50)
HGB BLD-MCNC: 13.5 G/DL (ref 12–16)
IMM GRANULOCYTES # BLD AUTO: 0.01 K/UL (ref 0–0.04)
IMM GRANULOCYTES NFR BLD AUTO: 0.2 % (ref 0–0.5)
LDLC SERPL CALC-MCNC: 61.6 MG/DL (ref 63–159)
LYMPHOCYTES # BLD AUTO: 1.5 K/UL (ref 1–4.8)
LYMPHOCYTES NFR BLD: 36.2 % (ref 18–48)
MCH RBC QN AUTO: 29.9 PG (ref 27–31)
MCHC RBC AUTO-ENTMCNC: 34.1 G/DL (ref 32–36)
MCV RBC AUTO: 88 FL (ref 82–98)
MONOCYTES # BLD AUTO: 0.5 K/UL (ref 0.3–1)
MONOCYTES NFR BLD: 10.6 % (ref 4–15)
NEUTROPHILS # BLD AUTO: 2.2 K/UL (ref 1.8–7.7)
NEUTROPHILS NFR BLD: 51.1 % (ref 38–73)
NONHDLC SERPL-MCNC: 70 MG/DL
NRBC BLD-RTO: 0 /100 WBC
PLATELET # BLD AUTO: 249 K/UL (ref 150–350)
PMV BLD AUTO: 10.2 FL (ref 9.2–12.9)
POTASSIUM SERPL-SCNC: 4.1 MMOL/L (ref 3.5–5.1)
PROT SERPL-MCNC: 7.1 G/DL (ref 6–8.4)
RBC # BLD AUTO: 4.52 M/UL (ref 4–5.4)
SODIUM SERPL-SCNC: 138 MMOL/L (ref 136–145)
TRIGL SERPL-MCNC: 42 MG/DL (ref 30–150)
TSH SERPL DL<=0.005 MIU/L-ACNC: 0.42 UIU/ML (ref 0.4–4)
WBC # BLD AUTO: 4.23 K/UL (ref 3.9–12.7)

## 2019-07-30 PROCEDURE — 90715 TDAP VACCINE 7 YRS/> IM: CPT | Mod: S$GLB,,, | Performed by: NURSE PRACTITIONER

## 2019-07-30 PROCEDURE — 99999 PR PBB SHADOW E&M-EST. PATIENT-LVL III: ICD-10-PCS | Mod: PBBFAC,,, | Performed by: NURSE PRACTITIONER

## 2019-07-30 PROCEDURE — 86703 HIV-1/HIV-2 1 RESULT ANTBDY: CPT

## 2019-07-30 PROCEDURE — 86592 SYPHILIS TEST NON-TREP QUAL: CPT

## 2019-07-30 PROCEDURE — 87491 CHLMYD TRACH DNA AMP PROBE: CPT

## 2019-07-30 PROCEDURE — 36415 COLL VENOUS BLD VENIPUNCTURE: CPT | Mod: PO

## 2019-07-30 PROCEDURE — 83036 HEMOGLOBIN GLYCOSYLATED A1C: CPT

## 2019-07-30 PROCEDURE — 80053 COMPREHEN METABOLIC PANEL: CPT

## 2019-07-30 PROCEDURE — 87624 HPV HI-RISK TYP POOLED RSLT: CPT

## 2019-07-30 PROCEDURE — 87661 TRICHOMONAS VAGINALIS AMPLIF: CPT

## 2019-07-30 PROCEDURE — 90471 IMMUNIZATION ADMIN: CPT | Mod: S$GLB,,, | Performed by: NURSE PRACTITIONER

## 2019-07-30 PROCEDURE — 99395 PR PREVENTIVE VISIT,EST,18-39: ICD-10-PCS | Mod: 25,S$GLB,, | Performed by: NURSE PRACTITIONER

## 2019-07-30 PROCEDURE — 87481 CANDIDA DNA AMP PROBE: CPT | Mod: 59

## 2019-07-30 PROCEDURE — 84443 ASSAY THYROID STIM HORMONE: CPT

## 2019-07-30 PROCEDURE — 99999 PR PBB SHADOW E&M-EST. PATIENT-LVL III: CPT | Mod: PBBFAC,,, | Performed by: NURSE PRACTITIONER

## 2019-07-30 PROCEDURE — 80074 ACUTE HEPATITIS PANEL: CPT

## 2019-07-30 PROCEDURE — 90471 TDAP VACCINE GREATER THAN OR EQUAL TO 7YO IM: ICD-10-PCS | Mod: S$GLB,,, | Performed by: NURSE PRACTITIONER

## 2019-07-30 PROCEDURE — 88175 CYTOPATH C/V AUTO FLUID REDO: CPT

## 2019-07-30 PROCEDURE — 80061 LIPID PANEL: CPT

## 2019-07-30 PROCEDURE — 99395 PREV VISIT EST AGE 18-39: CPT | Mod: 25,S$GLB,, | Performed by: NURSE PRACTITIONER

## 2019-07-30 PROCEDURE — 90715 TDAP VACCINE GREATER THAN OR EQUAL TO 7YO IM: ICD-10-PCS | Mod: S$GLB,,, | Performed by: NURSE PRACTITIONER

## 2019-07-30 PROCEDURE — 85025 COMPLETE CBC W/AUTO DIFF WBC: CPT

## 2019-07-30 NOTE — PATIENT INSTRUCTIONS
Prevention Guidelines, Women Ages 18 to 39  Screening tests and vaccines are an important part of managing your health. Health counseling is essential, too. Below are guidelines for these, for women ages 18 to 39. Talk with your healthcare provider to make sure youre up-to-date on what you need.  Screening Who needs it How often   Alcohol misuse All women in this age group At routine exams   Blood pressure All women in this age group Every 2 years if your blood pressure is less than 120/80 mm Hg; yearly if your systolic blood pressure is 120 to 139 mm Hg, or your diastolic blood pressure reading is 80 to 89 mm Hg   Breast cancer All women in this age group should talk with their healthcare providers about the need for clinical breast exams (CBE)1 Clinical breast exam every 3 years1   Cervical cancer Women ages 21 and older Women between ages 21 and 29 should have a Pap test every 3 years; women between ages 30 and 65 are advised to have a Pap test plus an HPV test every 5 years   Chlamydia Sexually active women ages 24 and younger, and women at increased risk for infection Every 3 years if you're at risk or have symptoms   Depression All women in this age group At routine exams   Diabetes mellitus, type 2 Adults with no symptoms who are overweight or obese and have 1 or more other risk factors for diabetes At least every 3 years   Gonorrhea Sexually active women at increased risk for infection At routine exams   Hepatitis C Anyone at increased risk At routine exams   HIV All women At routine exams   Obesity All women in this age group At routine exams   Syphilis Women at increased risk for infection - talk with your healthcare provider At routine exams   Tuberculosis Women at increased risk for infection - talk with your healthcare provider Ask your healthcare provider   Vision All women in this age group At least 1 complete exam in your 20s, and 2 in your 30s   Vaccine2 Who needs it How often   Chickenpox  (varicella) All women in this age group who have no record of this infection or vaccine 2 doses; the second dose should be given 4 to 8 weeks after the first dose   Hepatitis A Women at increased risk for infection - talk with your healthcare provider 2 doses given at least 6 months apart   Hepatitis B Women at increased risk for infection - talk with your healthcare provider 3 doses over 6 months; second dose should be given 1 month after the first dose; the third dose should be given at least 2 months after the second dose and at least 4 months after the first dose   Haemophilus influenzae Type B (HIB) Women at increased risk for infection - talk with your healthcare provider 1 to 3 doses   Human papillomavirus (HPV) All women in this age group up to age 26 3 doses; the second dose should be given 1 to 2 months after the first dose and the third dose given 6 months after the first dose   Influenza (flu) All women in this age group Once a year   Measles, mumps, rubella (MMR) All women in this age group who have no record of these infections or vaccines 1 or 2 doses   Meningococcal Women at increased risk for infection - talk with your healthcare provider 1 or more doses   Pneumococcal conjugate vaccine (PCV13) and pneumococcal polysaccharide vaccine (PPSV23) Women at increased risk for infection - talk with your healthcare provider PCV13: 1 dose ages 19 to 65 (protects against 13 types of pneumococcal bacteria)  PPSV23: 1 to 2 doses through age 64, or 1 dose at 65 or older (protects against 23 types of pneumococcal bacteria)      Tetanus/diphtheria/pertussis (Td/Tdap) booster All women in this age group Td every 10 years, or a one-time dose of Tdap instead of a Td booster after age 18, then Td every 10 years   Counseling Who needs it How often   BRCA gene mutation testing for breast and ovarian cancer susceptibility Women with increased risk for having gene mutation When your risk is known   Breast cancer and  chemoprevention Women at high risk for breast cancer When your risk is known   Diet and exercise Women who are overweight or obese When diagnosed, and then at routine exams   Domestic violence Women at the age in which they are able to have children At routine exams   Sexually transmitted infection prevention Women who are sexually active At routine exams   Skin cancer Prevention of skin cancer in fair-skinned adults through age 24 At routine exams   Use of tobacco and the health effects it can cause All women in this age group Every visit   1According to the ACS, women ages 20 to 39 years should have a clinical breast exam (CBE) as part of their routine health exam every 3 years. Breast self-exams are an option for women starting in their 20s. But the  USPSTF does not recommend CBE.  2Those who are 18 years old and not up-to-date on their childhood vaccines should get all appropriate catch-up vaccines recommended by the CDC.  Date Last Reviewed: 8/27/2015  © 0639-6669 [employer] [address] [place] [address], [place] [address]. All rights reserved. This information is not intended as a substitute for professional medical care. Always follow your healthcare professional's instructions.

## 2019-07-30 NOTE — PROGRESS NOTES
Patient tolerated injection well.  Instructed to remain in lobby for 15 minutes and to report any adverse reactions right away.  Verbalized understanding.

## 2019-07-30 NOTE — PROGRESS NOTES
"Well Woman VISIT      CHIEF COMPLAINT  Chief Complaint   Patient presents with    Annual Exam       HPI  Demario Christine is a 29 y.o. female who presents annual exam.     Social Factors  Tobacco use: cigars about 5 times weekly  Ready to Quit: No  Alcohol: No  Intimate partner violence screening  "Do you feel safe in your current relationship?" Yes   "Have you ever been in a relationship in which your partner frightened you or hurt you?" No  Living Will/POA: No  Regular Exercise: Yes. About twice a week    Depression  Over the past two weeks, have you felt down, depressed, or hopeless? Yes she reports this as normal  Over the past two weeks, have you felt little interest or pleasure in doing things? Yes     Reproductive Health  Last menstrual period began 07/20/2019 and was regular.   Patient is sexually active.   Contraception: no method  On Daily folic acid:  No  STD screening in last year: 10/21/2017  Last PAP: 07/30/2019  HIV screening: 10/24/2017    CHD, HTN, DM2  CHD Risk Factors: smoking/ tobacco exposure  Women 45 years and older should be screened for dyslipidemia if at increased risk of CHD  Women 20 to 45 years of age should be screened for dyslipidemia if at increased risk of CHD  Asymptomatic adults with sustained blood pressure greater than 135/80 mm Hg (treated or untreated) should be screened for type 2 diabetes mellitus    Estimated body mass index is 24.82 kg/m² as calculated from the following:    Height as of 9/13/18: 5' 5" (1.651 m).    Weight as of 9/13/18: 67.7 kg (149 lb 2.3 oz).      Screening  Mammogram needed: not clinically indicated  Colonoscopy needed: not clinically indicated  Osteoporosis screen needed: not clinically indicated     Women 50 to 74 years of age should be screened for breast cancer with mammography biennially.  Women should be screened for cervical cancer with Pap tests beginning at 21 years of age. Low-risk women should receive Pap testing every three years. " Co-testing for human papillomavirus is an option beginning at 30 years of age, and can extend the screening interval to five years. Cervical cancer screening should be discontinued at 65 years of age or after total hysterectomy if the woman has a benign gynecologic history  Adults 50 to 75 years of age should be screened for colorectal cancer with an FOBT annually, sigmoidoscopy every five years with an FOBT every three years, or colonoscopy every 10 years.  Women 65 years and older should be screened for osteoporosis. Women younger than 65 years should be screened if the risk of fracture is greater than or equal to that of a 65-year-old white woman without additional risk factors.      Immunizations  up to date and documented    ALLERGIES and MEDS were verified.   PMHx, PSHx, FHx, SOCIALHx were updated as pertinent.    REVIEW OF SYSTEMS  Review or Systems  Eyes: denies visual changes at this time denies floaters   ENT: no nasal congestion or sore throat  Respiratory: no cough or shorness of breath  Cardiovascular: no chest pain or palpitations  Gastrointestinal: no nausea or vomiting, no abdominal pain or change in bowel habits  Genitourinary: no hematuria or dysuria; denies frequency  Hematologic/Lymphatic: no easy bruising or lymphadenopathy  Musculoskeletal: no arthralgias or myalgias  Neurological: no seizures or tremors  Endocrine: no heat or cold intolerance      PHYSICAL EXAM  VITAL SIGNS: /80 (BP Location: Right arm, Patient Position: Sitting, BP Method: Medium (Manual))   LMP 07/20/2019   GEN: Well developed, Well nourished, No acute distress.  HENT: Normocephalic, Atraumatic, Bilateral external ears normal, Nose normal, Oropharynx moist, No oral exudates.   Eyes: PERRLA, EOMI, Conjunctiva normal, No discharge.   Neck: Supple, No tenderness.  Lymphatic: No cervical or supraclavicular lymphadenopathy noted.   Cardiovascular: Normal heart rate, Normal rhythm, No murmurs, No rubs, No gallops.   Thorax  & Lungs: Normal breath sounds, No respiratory distress, No wheezing.  Abdomen: Soft, No tenderness, Bowel sounds normal.  Breast: Bilateral breasts were palpated in a circular fashion, No masses, No nipple abnormality, No axillary lymphadenopathy  Genital: Nurse was present in the room during the exam.  External genitalia normal vulva with no masses, tenderness or lesions/rash. Patient had recently shaved but no folliculitis noted. No abnormal vaginal discharge or vaginal lesions. No cervical lesions or cervical motion tenderness. Wet Mount was obtained. Chlamydia/Gonorrhea culture was obtained. Papanicolaou specimen was obtained using Cytobroom. Rectal exam not indicated. No evidence of infections.   Skin: Warm, Dry, No erythema, No rash.   Extremities: No edema, No tenderness.       ASSESSMENT/PLAN    Demario was seen today for annual exam.    Diagnoses and all orders for this visit:    Well woman exam with routine gynecological exam  -     CBC auto differential; Future  -     Comprehensive metabolic panel; Future  -     TSH; Future  -     Lipid panel; Future  -     Hemoglobin A1c; Future  -     Liquid-based pap smear, screening  -     HPV High Risk Genotypes, PCR    Screen for STD (sexually transmitted disease)  -     Hepatitis panel, acute; Future  -     HIV 1/2 Ag/Ab (4th Gen); Future  -     RPR; Future  -     C. trachomatis/N. gonorrhoeae by AMP DNA  -     Vaginosis Screen by DNA Probe    Need for Tdap vaccination  -     (In Office Administered) Tdap Vaccine    Health Maintenance: up to date  Preventative Care: annual eye and dental exams    Will follow up once results available. Encouraged to quit smoking black and milds.   FOLLOW UP: Follow up in about 1 year (around 7/30/2020).

## 2019-07-31 LAB
BACTERIAL VAGINOSIS DNA: POSITIVE
C TRACH DNA SPEC QL NAA+PROBE: NOT DETECTED
CANDIDA GLABRATA DNA: NEGATIVE
CANDIDA KRUSEI DNA: NEGATIVE
CANDIDA RRNA VAG QL PROBE: POSITIVE
HAV IGM SERPL QL IA: NEGATIVE
HBV CORE IGM SERPL QL IA: NEGATIVE
HBV SURFACE AG SERPL QL IA: NEGATIVE
HCV AB SERPL QL IA: NEGATIVE
HIV 1+2 AB+HIV1 P24 AG SERPL QL IA: NEGATIVE
N GONORRHOEA DNA SPEC QL NAA+PROBE: NOT DETECTED
RPR SER QL: NORMAL
T VAGINALIS RRNA GENITAL QL PROBE: NEGATIVE

## 2019-07-31 RX ORDER — METRONIDAZOLE 500 MG/1
500 TABLET ORAL EVERY 12 HOURS
Qty: 14 TABLET | Refills: 0 | Status: SHIPPED | OUTPATIENT
Start: 2019-07-31 | End: 2019-08-07

## 2019-07-31 RX ORDER — FLUCONAZOLE 150 MG/1
150 TABLET ORAL ONCE
Qty: 1 TABLET | Refills: 0 | Status: SHIPPED | OUTPATIENT
Start: 2019-07-31 | End: 2019-07-31

## 2019-08-02 ENCOUNTER — PATIENT MESSAGE (OUTPATIENT)
Dept: FAMILY MEDICINE | Facility: CLINIC | Age: 29
End: 2019-08-02

## 2019-08-02 DIAGNOSIS — Z11.3 SCREEN FOR STD (SEXUALLY TRANSMITTED DISEASE): Primary | ICD-10-CM

## 2019-08-02 LAB
HPV HR 12 DNA CVX QL NAA+PROBE: NEGATIVE
HPV16 AG SPEC QL: NEGATIVE
HPV18 DNA SPEC QL NAA+PROBE: NEGATIVE

## 2019-08-09 ENCOUNTER — LAB VISIT (OUTPATIENT)
Dept: LAB | Facility: HOSPITAL | Age: 29
End: 2019-08-09
Attending: NURSE PRACTITIONER
Payer: COMMERCIAL

## 2019-08-09 DIAGNOSIS — Z11.3 SCREEN FOR STD (SEXUALLY TRANSMITTED DISEASE): ICD-10-CM

## 2019-08-09 PROCEDURE — 86694 HERPES SIMPLEX NES ANTBDY: CPT

## 2019-08-09 PROCEDURE — 86696 HERPES SIMPLEX TYPE 2 TEST: CPT

## 2019-08-11 LAB — HSV AB, IGM BY EIA: NEGATIVE

## 2019-08-13 LAB
HSV1 IGG SERPL QL IA: NEGATIVE
HSV2 IGG SERPL QL IA: POSITIVE

## 2019-08-19 ENCOUNTER — PATIENT MESSAGE (OUTPATIENT)
Dept: FAMILY MEDICINE | Facility: CLINIC | Age: 29
End: 2019-08-19

## 2019-08-21 ENCOUNTER — OFFICE VISIT (OUTPATIENT)
Dept: FAMILY MEDICINE | Facility: CLINIC | Age: 29
End: 2019-08-21
Payer: COMMERCIAL

## 2019-08-21 VITALS
WEIGHT: 146.19 LBS | SYSTOLIC BLOOD PRESSURE: 125 MMHG | HEART RATE: 95 BPM | BODY MASS INDEX: 24.36 KG/M2 | DIASTOLIC BLOOD PRESSURE: 80 MMHG | TEMPERATURE: 99 F | HEIGHT: 65 IN | OXYGEN SATURATION: 99 %

## 2019-08-21 DIAGNOSIS — F41.9 ANXIETY: Primary | ICD-10-CM

## 2019-08-21 DIAGNOSIS — R89.4 POSITIVE TEST FOR HERPES SIMPLEX VIRUS (HSV) ANTIBODY: ICD-10-CM

## 2019-08-21 PROCEDURE — 99999 PR PBB SHADOW E&M-EST. PATIENT-LVL III: ICD-10-PCS | Mod: PBBFAC,,, | Performed by: FAMILY MEDICINE

## 2019-08-21 PROCEDURE — 3008F PR BODY MASS INDEX (BMI) DOCUMENTED: ICD-10-PCS | Mod: CPTII,S$GLB,, | Performed by: FAMILY MEDICINE

## 2019-08-21 PROCEDURE — 99214 OFFICE O/P EST MOD 30 MIN: CPT | Mod: S$GLB,,, | Performed by: FAMILY MEDICINE

## 2019-08-21 PROCEDURE — 99999 PR PBB SHADOW E&M-EST. PATIENT-LVL III: CPT | Mod: PBBFAC,,, | Performed by: FAMILY MEDICINE

## 2019-08-21 PROCEDURE — 3008F BODY MASS INDEX DOCD: CPT | Mod: CPTII,S$GLB,, | Performed by: FAMILY MEDICINE

## 2019-08-21 PROCEDURE — 99214 PR OFFICE/OUTPT VISIT, EST, LEVL IV, 30-39 MIN: ICD-10-PCS | Mod: S$GLB,,, | Performed by: FAMILY MEDICINE

## 2019-08-21 RX ORDER — SERTRALINE HYDROCHLORIDE 100 MG/1
100 TABLET, FILM COATED ORAL DAILY
Qty: 90 TABLET | Refills: 1 | Status: SHIPPED | OUTPATIENT
Start: 2019-08-21 | End: 2020-01-13 | Stop reason: SDUPTHER

## 2019-08-21 NOTE — PROGRESS NOTES
Office Visit    Patient Name: Demario Christine    : 1990  MRN: [id number]      Assessment/Plan:  Demario Christine is a 29 y.o. female who presents today for :    Positive test for herpes simplex virus (HSV) antibody  Anxiety  -     sertraline (ZOLOFT) 100 MG tablet; Take 1 tablet (100 mg total) by mouth once daily.  Dispense: 90 tablet; Refill: 1  -restart SSRI, reassured patient - currently no Sx nor lesions - counseled pt on nature of condition and that antivirals do not cure the infection, but may be used to suppress symptoms and reduce viral shedding. Advised to inform (potential) partner(s) of positive result so that they may consider testing for serology  -counseled that transmission can occur even when asymptomatic  -advised safe and healthy sexual practices, avoid intercourse during presence of active lesions if possible  -she appears to have a lot of anxiety over her positive test result, so I emphasized stress management measures with patient at length, including but not limited to, regular physical exercise, healthy diet, relaxation techniques.  -f/u in 5 weeks or sooner as needed     This note was created by combination of typed  and Dragon dictation.  Transcription errors may be present.  If there are any questions, please contact me.        ----------------------------------------------------------------------------------------------------------------------      HPI:  Patient Care Team:  Gianna Porter MD as PCP - General (Family Medicine)    Demario is a 29 y.o. female with      Patient Active Problem List   Diagnosis    Migraine headache    Anxiety    Refractive error    Chalazion of right upper eyelid     This patient is new to me       Patient presents today for :  lab results    -she recently had a physical and all labs came back normal except for positive HSV-2 IgG. She has only 1 sexual partner in her boyfriend, whom she does not use condoms with at all, and also tested  positive for HSV2. She denies any history of genital skin lesions. She does have a h/o anxiety that she was on Zoloft for previously, but had stopped earlier this year as she had felt better. However, when she found out her HSV test came back positive, she has been having a lot of anxiety and worry about having an STD, not being to sleep nor eat well. She denies SI/HI - occasionally has palpitations and tingling around her mouth when she breathes too fast. She desires to be back on Zoloft again.        Additional ROS    No F/C/wt changes/fatigue  No dysphagia/sore throat/rhinorrhea  No CP/CHASE/palpitations/swelling  No cough/wheezing/SOB  No nausea/vomiting/abd pain/no diarrhea  No muscle aches/joint pain   No rashes  No MSK weakness/HA/numbness  No dysuria/hematuria  No genital ulcers/bleeding/discharge  No polyuria/polydipsia/fatigueNo bleeding/bruising/swelling/lumps in axilla, groin, or neck/wt changes/cold or hot intolerance  No rash, no history of allergies to any specific substances                Patient Active Problem List   Diagnosis    Migraine headache    Anxiety    Refractive error    Chalazion of right upper eyelid       Current Medications  Medications reviewed and updated.       Current Outpatient Medications:     pantoprazole (PROTONIX) 40 MG tablet, Take 1 tablet (40 mg total) by mouth once daily., Disp: 30 tablet, Rfl: 0    ondansetron (ZOFRAN-ODT) disintegrating tablet, as needed. , Disp: , Rfl:     sertraline (ZOLOFT) 100 MG tablet, Take 1 tablet (100 mg total) by mouth once daily., Disp: 90 tablet, Rfl: 1    Past Surgical History:   Procedure Laterality Date    COLONOSCOPY N/A 5/15/2017    Performed by [facility] at [facility] (4[facility])    ESOPHAGOGASTRODUODENOSCOPY (EGD) N/A 5/15/2017    Performed by Errol [facility] MD at [facility] END[facility] (4TH FLR)       Family History   Problem Relation Age of Onset    Hypertension Mother     No Known Problems Father     No Known Problems Sister      No Known Problems Brother     No Known Problems Maternal Aunt     No Known Problems Maternal Uncle     No Known Problems Paternal Aunt     No Known Problems Paternal Uncle     Cataracts Maternal Grandmother     No Known Problems Maternal Grandfather     No Known Problems Paternal Grandmother     No Known Problems Paternal Grandfather     Blindness Neg Hx     Cancer Neg Hx     Diabetes Neg Hx     Glaucoma Neg Hx     Inflammatory bowel disease Neg Hx     Stomach cancer Neg Hx     Colon cancer Neg Hx     Rectal cancer Neg Hx     Esophageal cancer Neg Hx     Crohn's disease Neg Hx     Celiac disease Neg Hx     Amblyopia Neg Hx     Macular degeneration Neg Hx     Retinal detachment Neg Hx     Strabismus Neg Hx     Stroke Neg Hx     Thyroid disease Neg Hx        Social History     Socioeconomic History    Marital status: Single     Spouse name: Not on file    Number of children: Not on file    Years of education: Not on file    Highest education level: Not on file   Occupational History    Occupation:      Employer: [employer]    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   Social Needs    Financial resource strain: Not on file    Food insecurity:     Worry: Not on file     Inability: Not on file    Transportation needs:     Medical: Not on file     Non-medical: Not on file   Tobacco Use    Smoking status: Current Every Day Smoker    Smokeless tobacco: Never Used   Substance and Sexual Activity    Alcohol use: Yes     Comment: socially    Drug use: No    Sexual activity: Yes     Partners: Male     Birth control/protection: Condom   Lifestyle    Physical activity:     Days per week: Not on file     Minutes per session: Not on file    Stress: Not on file   Relationships    Social connections:     Talks on phone: Not on file     Gets together: Not on file     Attends Sabianism service: Not on file     Active member of club or  "organization: Not on file     Attends meetings of clubs or organizations: Not on file     Relationship status: Not on file   Other Topics Concern    Not on file   Social History Narrative    Not on file           Allergies   Review of patient's allergies indicates:  No Known Allergies          Review of Systems  See HPI      Physical Exam  /80   Pulse 95   Temp 98.5 °F (36.9 °C) (Oral)   Ht 5' 5" (1.651 m)   Wt 66.3 kg (146 lb 2.6 oz)   SpO2 99%   BMI 24.32 kg/m²     GEN: appears anxious, tearful  HEENT: NCAT, PERRLA, EOMI, sclera clear, anicteric, O/P clear, MMM with no lesions  NECK: normal, supple with midline trachea, no LAD, no thyromegaly  LUNGS: CTAB, no w/r/r, no increased work of breathing   HEART: RRR, normal S1 and S2, no m/r/g, no edema  ABD: s/nt/nd, NABS  SKIN: normal turgor, no rashes  PSYCH: AOx3, appropriate mood and affect  MSK: warm/well perfused, normal ROM in all extremities, no c/c/e.          At least 25 minutes were spent today with the patient in the office, which more than half the time was spent in counseling regarding management as above, as well as answering questions and addressing patient's concerns. Pt voices understanding of what was discussed and has no other questions at this time.      "

## 2019-11-04 ENCOUNTER — OFFICE VISIT (OUTPATIENT)
Dept: FAMILY MEDICINE | Facility: CLINIC | Age: 29
End: 2019-11-04
Payer: COMMERCIAL

## 2019-11-04 VITALS
HEIGHT: 65 IN | BODY MASS INDEX: 24.53 KG/M2 | TEMPERATURE: 98 F | DIASTOLIC BLOOD PRESSURE: 83 MMHG | HEART RATE: 107 BPM | OXYGEN SATURATION: 99 % | SYSTOLIC BLOOD PRESSURE: 133 MMHG | WEIGHT: 147.25 LBS

## 2019-11-04 DIAGNOSIS — Z23 ENCOUNTER FOR ADMINISTRATION OF VACCINE: ICD-10-CM

## 2019-11-04 DIAGNOSIS — F41.9 ANXIETY: Primary | ICD-10-CM

## 2019-11-04 DIAGNOSIS — R20.2 TINGLING: ICD-10-CM

## 2019-11-04 PROCEDURE — 3008F PR BODY MASS INDEX (BMI) DOCUMENTED: ICD-10-PCS | Mod: CPTII,S$GLB,, | Performed by: FAMILY MEDICINE

## 2019-11-04 PROCEDURE — 99999 PR PBB SHADOW E&M-EST. PATIENT-LVL III: CPT | Mod: PBBFAC,,, | Performed by: FAMILY MEDICINE

## 2019-11-04 PROCEDURE — 99214 OFFICE O/P EST MOD 30 MIN: CPT | Mod: S$GLB,,, | Performed by: FAMILY MEDICINE

## 2019-11-04 PROCEDURE — 99214 PR OFFICE/OUTPT VISIT, EST, LEVL IV, 30-39 MIN: ICD-10-PCS | Mod: S$GLB,,, | Performed by: FAMILY MEDICINE

## 2019-11-04 PROCEDURE — 3008F BODY MASS INDEX DOCD: CPT | Mod: CPTII,S$GLB,, | Performed by: FAMILY MEDICINE

## 2019-11-04 PROCEDURE — 99999 PR PBB SHADOW E&M-EST. PATIENT-LVL III: ICD-10-PCS | Mod: PBBFAC,,, | Performed by: FAMILY MEDICINE

## 2019-11-04 NOTE — PROGRESS NOTES
Office Visit    Patient Name: Demario Christine    : 1990  MRN: [id number]      Assessment/Plan:  Demario Christine is a 29 y.o. female who presents today for :    Anxiety  -improving, may increase to one full table of Zoloft 100mg daily - may take before bedtime if it makes her drowsy taking it in the morning.   -f/u as needed    Tingling  -trial of cessation of using hookah as it has resolved her Sx in the past, oral exam unremarkable today. D/w pt of increased risk of heart/lung disease as well as potential oral malignancy with long-term hookah use    Encounter for administration of vaccine  -     (In Office Administered) Pneumococcal Polysaccharide Vaccine (23 Valent) (SQ/IM)      Follow up for any evaluation as needed.     This note was created by combination of typed  and Dragon dictation.  Transcription errors may be present.  If there are any questions, please contact me.      ----------------------------------------------------------------------------------------------------------------------      HPI:  Patient Care Team:  Gianna Porter MD as PCP - General (Family Medicine)    Demario is a 29 y.o. female with      Patient Active Problem List   Diagnosis    Migraine headache    Anxiety    Refractive error    Chalazion of right upper eyelid     Patient desires to switch PCP care to me today.    Patient presents today for f/u of:  Anxiety    Anxiety - was seen by me for this issue over two months ago and was started back on Zoloft, which she states has been helping. She had cut down to 1/2 of the 100mg tablet of Zoloft because it made her somewhat drowsy. She feels that Zoloft has helped with her Sx, but sometimes feels that anxiety is still present with increased worrying without any particular trigger. Appetite and sleep is normal. No SI/HI. 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 She also states that she sometimes gets mouth tingling sensation  with hookah use, which she likes to do very often. She had tried to stop using hookah for a few days previously with resolution of this tingling sensation. She denies panic attacks nor palpitations/CP/SOB        Additional ROS    No F/C/wt changes/fatigue  No dysphagia/sore throat  No CP/CHASE/palpitations/swelling  No cough/wheezing/SOB  No nausea/vomiting/abd pain  No MSK weakness/HA, +oral tingling, no numbness  No rashes  No depression, +anxiety  No dysuria/hematuria              Patient Active Problem List   Diagnosis    Migraine headache    Anxiety    Refractive error    Chalazion of right upper eyelid       Current Medications  Medications reviewed/updated.     Current Outpatient Medications on File Prior to Visit   Medication Sig Dispense Refill    ondansetron (ZOFRAN-ODT) disintegrating tablet as needed.       pantoprazole (PROTONIX) 40 MG tablet Take 1 tablet (40 mg total) by mouth once daily. 30 tablet 0    sertraline (ZOLOFT) 100 MG tablet Take 1 tablet (100 mg total) by mouth once daily. 90 tablet 1     No current facility-administered medications on file prior to visit.            Past Surgical History:   Procedure Laterality Date    COLONOSCOPY N/A 5/15/2017    Procedure: COLONOSCOPY;  Surgeon: Errol Hernandez MD;  Location: [facility] ([address] [place] [address]);  Service: Endoscopy;  Laterality: N/A;       Family History   Problem Relation Age of Onset    Hypertension Mother     No Known Problems Father     No Known Problems Sister     No Known Problems Brother     No Known Problems Maternal Aunt     No Known Problems Maternal Uncle     No Known Problems Paternal Aunt     No Known Problems Paternal Uncle     Cataracts Maternal Grandmother     No Known Problems Maternal Grandfather     No Known Problems Paternal Grandmother     No Known Problems Paternal Grandfather     Blindness Neg Hx     Cancer Neg Hx     Diabetes Neg Hx     Glaucoma Neg Hx     Inflammatory bowel disease Neg Hx     Stomach  "cancer Neg Hx     Colon cancer Neg Hx     Rectal cancer Neg Hx     Esophageal cancer Neg Hx     Crohn's disease Neg Hx     Celiac disease Neg Hx     Amblyopia Neg Hx     Macular degeneration Neg Hx     Retinal detachment Neg Hx     Strabismus Neg Hx     Stroke Neg Hx     Thyroid disease Neg Hx        Social History     Socioeconomic History    Marital status: Single     Spouse name: Not on file    Number of children: Not on file    Years of education: Not on file    Highest education level: Not on file   Occupational History    Occupation:      Employer: [employer]    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   Social Needs    Financial resource strain: Not on file    Food insecurity:     Worry: Not on file     Inability: Not on file    Transportation needs:     Medical: Not on file     Non-medical: Not on file   Tobacco Use    Smoking status: Current Every Day Smoker    Smokeless tobacco: Never Used   Substance and Sexual Activity    Alcohol use: Yes     Comment: socially    Drug use: No    Sexual activity: Yes     Partners: Male     Birth control/protection: Condom   Lifestyle    Physical activity:     Days per week: Not on file     Minutes per session: Not on file    Stress: Not on file   Relationships    Social connections:     Talks on phone: Not on file     Gets together: Not on file     Attends Spiritism service: Not on file     Active member of club or organization: Not on file     Attends meetings of clubs or organizations: Not on file     Relationship status: Not on file   Other Topics Concern    Not on file   Social History Narrative    Not on file             Allergies   Review of patient's allergies indicates:  No Known Allergies          Review of Systems  See HPI      Physical Exam  /83 (BP Location: Right arm, Patient Position: Sitting, BP Method: Large (Manual))   Pulse 107   Temp 98 °F (36.7 °C) (Oral)   Ht 5' 5" " (1.651 m)   Wt 66.8 kg (147 lb 4.3 oz)   LMP 10/16/2019 (Exact Date)   SpO2 99%   BMI 24.51 kg/m²     GEN: NAD, well developed, pleasant, well nourished  HEENT: NCAT, PERRLA, EOMI, sclera clear, anicteric, O/P clear, MMM with no lesions nor rashes - nonTTP  NECK: normal, supple with midline trachea, no LAD, no thyromegaly  LUNGS: CTAB, no w/r/r, no increased work of breathing   HEART: RRR, normal S1 and S2, no m/r/g, no edema  ABD: s/nt/nd, NABS  SKIN: normal turgor, no rashes  PSYCH: AOx3, appropriate mood and affect  MSK: warm/well perfused, normal ROM in all extremities, no c/c/e.

## 2020-01-13 DIAGNOSIS — F41.9 ANXIETY: ICD-10-CM

## 2020-01-13 RX ORDER — SERTRALINE HYDROCHLORIDE 100 MG/1
100 TABLET, FILM COATED ORAL DAILY
Qty: 90 TABLET | Refills: 0 | Status: ON HOLD | OUTPATIENT
Start: 2020-01-13 | End: 2020-12-04 | Stop reason: HOSPADM

## 2020-01-14 ENCOUNTER — OFFICE VISIT (OUTPATIENT)
Dept: FAMILY MEDICINE | Facility: CLINIC | Age: 30
End: 2020-01-14
Payer: COMMERCIAL

## 2020-01-14 ENCOUNTER — LAB VISIT (OUTPATIENT)
Dept: LAB | Facility: HOSPITAL | Age: 30
End: 2020-01-14
Attending: FAMILY MEDICINE
Payer: COMMERCIAL

## 2020-01-14 VITALS
TEMPERATURE: 99 F | SYSTOLIC BLOOD PRESSURE: 130 MMHG | DIASTOLIC BLOOD PRESSURE: 80 MMHG | HEART RATE: 96 BPM | BODY MASS INDEX: 25.56 KG/M2 | OXYGEN SATURATION: 98 % | WEIGHT: 153.44 LBS | HEIGHT: 65 IN

## 2020-01-14 DIAGNOSIS — R20.2 TINGLING: Primary | ICD-10-CM

## 2020-01-14 DIAGNOSIS — R20.2 TINGLING: ICD-10-CM

## 2020-01-14 LAB
25(OH)D3+25(OH)D2 SERPL-MCNC: 11 NG/ML (ref 30–96)
ALBUMIN SERPL BCP-MCNC: 3.7 G/DL (ref 3.5–5.2)
ALP SERPL-CCNC: 73 U/L (ref 55–135)
ALT SERPL W/O P-5'-P-CCNC: 11 U/L (ref 10–44)
ANION GAP SERPL CALC-SCNC: 6 MMOL/L (ref 8–16)
AST SERPL-CCNC: 16 U/L (ref 10–40)
BASOPHILS # BLD AUTO: 0.04 K/UL (ref 0–0.2)
BASOPHILS NFR BLD: 0.7 % (ref 0–1.9)
BILIRUB SERPL-MCNC: 0.3 MG/DL (ref 0.1–1)
BUN SERPL-MCNC: 8 MG/DL (ref 6–20)
CALCIUM SERPL-MCNC: 9.4 MG/DL (ref 8.7–10.5)
CHLORIDE SERPL-SCNC: 106 MMOL/L (ref 95–110)
CO2 SERPL-SCNC: 28 MMOL/L (ref 23–29)
CREAT SERPL-MCNC: 0.8 MG/DL (ref 0.5–1.4)
DIFFERENTIAL METHOD: NORMAL
EOSINOPHIL # BLD AUTO: 0.1 K/UL (ref 0–0.5)
EOSINOPHIL NFR BLD: 0.9 % (ref 0–8)
ERYTHROCYTE [DISTWIDTH] IN BLOOD BY AUTOMATED COUNT: 11.9 % (ref 11.5–14.5)
EST. GFR  (AFRICAN AMERICAN): >60 ML/MIN/1.73 M^2
EST. GFR  (NON AFRICAN AMERICAN): >60 ML/MIN/1.73 M^2
FOLATE SERPL-MCNC: 10.1 NG/ML (ref 4–24)
GLUCOSE SERPL-MCNC: 89 MG/DL (ref 70–110)
HCT VFR BLD AUTO: 39.8 % (ref 37–48.5)
HGB BLD-MCNC: 13 G/DL (ref 12–16)
IMM GRANULOCYTES # BLD AUTO: 0.02 K/UL (ref 0–0.04)
IMM GRANULOCYTES NFR BLD AUTO: 0.4 % (ref 0–0.5)
LYMPHOCYTES # BLD AUTO: 1.5 K/UL (ref 1–4.8)
LYMPHOCYTES NFR BLD: 27.8 % (ref 18–48)
MCH RBC QN AUTO: 29.5 PG (ref 27–31)
MCHC RBC AUTO-ENTMCNC: 32.7 G/DL (ref 32–36)
MCV RBC AUTO: 91 FL (ref 82–98)
MONOCYTES # BLD AUTO: 0.5 K/UL (ref 0.3–1)
MONOCYTES NFR BLD: 8.5 % (ref 4–15)
NEUTROPHILS # BLD AUTO: 3.4 K/UL (ref 1.8–7.7)
NEUTROPHILS NFR BLD: 61.7 % (ref 38–73)
NRBC BLD-RTO: 0 /100 WBC
PLATELET # BLD AUTO: 272 K/UL (ref 150–350)
PMV BLD AUTO: 9.7 FL (ref 9.2–12.9)
POTASSIUM SERPL-SCNC: 4 MMOL/L (ref 3.5–5.1)
PROT SERPL-MCNC: 6.9 G/DL (ref 6–8.4)
RBC # BLD AUTO: 4.4 M/UL (ref 4–5.4)
SODIUM SERPL-SCNC: 140 MMOL/L (ref 136–145)
T4 FREE SERPL-MCNC: 0.9 NG/DL (ref 0.71–1.51)
TSH SERPL DL<=0.005 MIU/L-ACNC: 0.33 UIU/ML (ref 0.4–4)
VIT B12 SERPL-MCNC: 887 PG/ML (ref 210–950)
WBC # BLD AUTO: 5.43 K/UL (ref 3.9–12.7)

## 2020-01-14 PROCEDURE — 3008F BODY MASS INDEX DOCD: CPT | Mod: CPTII,S$GLB,, | Performed by: FAMILY MEDICINE

## 2020-01-14 PROCEDURE — 99999 PR PBB SHADOW E&M-EST. PATIENT-LVL III: ICD-10-PCS | Mod: PBBFAC,,, | Performed by: FAMILY MEDICINE

## 2020-01-14 PROCEDURE — 3008F PR BODY MASS INDEX (BMI) DOCUMENTED: ICD-10-PCS | Mod: CPTII,S$GLB,, | Performed by: FAMILY MEDICINE

## 2020-01-14 PROCEDURE — 99214 PR OFFICE/OUTPT VISIT, EST, LEVL IV, 30-39 MIN: ICD-10-PCS | Mod: S$GLB,,, | Performed by: FAMILY MEDICINE

## 2020-01-14 PROCEDURE — 36415 COLL VENOUS BLD VENIPUNCTURE: CPT | Mod: PO

## 2020-01-14 PROCEDURE — 99214 OFFICE O/P EST MOD 30 MIN: CPT | Mod: S$GLB,,, | Performed by: FAMILY MEDICINE

## 2020-01-14 PROCEDURE — 99999 PR PBB SHADOW E&M-EST. PATIENT-LVL III: CPT | Mod: PBBFAC,,, | Performed by: FAMILY MEDICINE

## 2020-01-14 PROCEDURE — 84439 ASSAY OF FREE THYROXINE: CPT

## 2020-01-14 PROCEDURE — 86038 ANTINUCLEAR ANTIBODIES: CPT

## 2020-01-14 PROCEDURE — 85025 COMPLETE CBC W/AUTO DIFF WBC: CPT

## 2020-01-14 PROCEDURE — 82607 VITAMIN B-12: CPT

## 2020-01-14 PROCEDURE — 82306 VITAMIN D 25 HYDROXY: CPT

## 2020-01-14 PROCEDURE — 80053 COMPREHEN METABOLIC PANEL: CPT

## 2020-01-14 PROCEDURE — 84207 ASSAY OF VITAMIN B-6: CPT

## 2020-01-14 PROCEDURE — 84443 ASSAY THYROID STIM HORMONE: CPT

## 2020-01-14 PROCEDURE — 82746 ASSAY OF FOLIC ACID SERUM: CPT

## 2020-01-14 NOTE — PROGRESS NOTES
Routine Office Visit    Patient Name: Demario Christine    : 1990  MRN: [id number]    Subjective:  Demario is a 29 y.o. female who presents today for     1. Tingling in and around her mouth - started a few months ago - she feels a tingling of her tongue and lip. She states her mouth feels warm and dry. She notices symptoms are triggered when she is anxious or worried. It does not occur daily. Symptoms are improved with cold.    Review of Systems   Constitutional: Negative for chills and fever.   HENT: Negative for congestion.    Eyes: Negative for blurred vision.   Respiratory: Negative for cough.    Cardiovascular: Negative for chest pain.   Gastrointestinal: Negative for abdominal pain, constipation, diarrhea, heartburn, nausea and vomiting.   Genitourinary: Negative for dysuria.   Musculoskeletal: Negative for myalgias.   Skin: Negative for itching and rash.   Neurological: Negative for dizziness and headaches.   Psychiatric/Behavioral: Negative for depression.       Active Problem List  Patient Active Problem List   Diagnosis    Migraine headache    Anxiety    Refractive error    Chalazion of right upper eyelid       Past Surgical History  Past Surgical History:   Procedure Laterality Date    COLONOSCOPY N/A 5/15/2017    Procedure: COLONOSCOPY;  Surgeon: Errol Hernandez MD;  Location: [address]);  Service: Endoscopy;  Laterality: N/A;       Family History  Family History   Problem Relation Age of Onset    Hypertension Mother     No Known Problems Father     No Known Problems Sister     No Known Problems Brother     No Known Problems Maternal Aunt     No Known Problems Maternal Uncle     No Known Problems Paternal Aunt     No Known Problems Paternal Uncle     Cataracts Maternal Grandmother     No Known Problems Maternal Grandfather     No Known Problems Paternal Grandmother     No Known Problems Paternal Grandfather     Blindness Neg Hx     Cancer Neg Hx     Diabetes Neg Hx      Glaucoma Neg Hx     Inflammatory bowel disease Neg Hx     Stomach cancer Neg Hx     Colon cancer Neg Hx     Rectal cancer Neg Hx     Esophageal cancer Neg Hx     Crohn's disease Neg Hx     Celiac disease Neg Hx     Amblyopia Neg Hx     Macular degeneration Neg Hx     Retinal detachment Neg Hx     Strabismus Neg Hx     Stroke Neg Hx     Thyroid disease Neg Hx        Social History  Social History     Socioeconomic History    Marital status: Single     Spouse name: Not on file    Number of children: Not on file    Years of education: Not on file    Highest education level: Not on file   Occupational History    Occupation:      Employer: [employer]    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   Social Needs    Financial resource strain: Not on file    Food insecurity:     Worry: Not on file     Inability: Not on file    Transportation needs:     Medical: Not on file     Non-medical: Not on file   Tobacco Use    Smoking status: Current Every Day Smoker    Smokeless tobacco: Never Used   Substance and Sexual Activity    Alcohol use: Yes     Comment: socially    Drug use: No    Sexual activity: Yes     Partners: Male     Birth control/protection: Condom   Lifestyle    Physical activity:     Days per week: Not on file     Minutes per session: Not on file    Stress: Not on file   Relationships    Social connections:     Talks on phone: Not on file     Gets together: Not on file     Attends Buddhism service: Not on file     Active member of club or organization: Not on file     Attends meetings of clubs or organizations: Not on file     Relationship status: Not on file   Other Topics Concern    Not on file   Social History Narrative    Not on file       Medications and Allergies  Reviewed and updated.   Current Outpatient Medications   Medication Sig    pantoprazole (PROTONIX) 40 MG tablet Take 1 tablet (40 mg total) by mouth once daily.     "sertraline (ZOLOFT) 100 MG tablet Take 1 tablet (100 mg total) by mouth once daily. Followup with doctor when this med runs out    ondansetron (ZOFRAN-ODT) disintegrating tablet as needed.      No current facility-administered medications for this visit.        Physical Exam  /80 (BP Location: Right arm, Patient Position: Sitting, BP Method: Medium (Manual))   Pulse 96   Temp 98.9 °F (37.2 °C) (Oral)   Ht 5' 5" (1.651 m)   Wt 69.6 kg (153 lb 7 oz)   LMP 01/06/2020   SpO2 98%   BMI 25.53 kg/m²   Physical Exam   Constitutional: She is oriented to person, place, and time. She appears well-developed and well-nourished.   HENT:   Head: Normocephalic and atraumatic.   Mouth/Throat: Uvula is midline. Mucous membranes are dry. Normal dentition.   Eyes: Pupils are equal, round, and reactive to light. Conjunctivae and EOM are normal.   Neck: Normal range of motion. Neck supple.   Cardiovascular: Normal rate, regular rhythm and normal heart sounds. Exam reveals no gallop and no friction rub.   No murmur heard.  Pulmonary/Chest: Breath sounds normal. No respiratory distress.   Abdominal: Soft. Bowel sounds are normal. She exhibits no distension. There is no tenderness.   Musculoskeletal: Normal range of motion.   Lymphadenopathy:     She has no cervical adenopathy.   Neurological: She is alert and oriented to person, place, and time.   Skin: Skin is warm.   Psychiatric: She has a normal mood and affect.         Assessment/Plan:  Demario Christine is a 29 y.o. female who presents today for :    Problem List Items Addressed This Visit     None      Visit Diagnoses     Tingling    -  Primary    Relevant Orders    Vitamin B12    Vitamin B6    Folate    Vitamin D    TSH    CBC auto differential    COSME Screen w/Reflex    Comprehensive metabolic panel  Recommend keeping food diary - sensation may be related to food / food allergies   Recommend mouth sore mouth wash to help with burning sensation / sores   F/u with labs " and treat as indicated  Consider vitamin defiency, sjogren's, anemia, etc               Follow up in about 7 months (around 8/14/2020), or if symptoms worsen or fail to improve.

## 2020-01-15 LAB — ANA SER QL IF: NORMAL

## 2020-01-16 ENCOUNTER — PATIENT MESSAGE (OUTPATIENT)
Dept: FAMILY MEDICINE | Facility: CLINIC | Age: 30
End: 2020-01-16

## 2020-01-16 RX ORDER — ERGOCALCIFEROL 1.25 MG/1
50000 CAPSULE ORAL
Qty: 12 CAPSULE | Refills: 0 | Status: SHIPPED | OUTPATIENT
Start: 2020-01-16 | End: 2020-03-09

## 2020-01-20 LAB — PYRIDOXAL SERPL-MCNC: 7 UG/L (ref 5–50)

## 2020-01-31 ENCOUNTER — TELEPHONE (OUTPATIENT)
Dept: OPTOMETRY | Facility: CLINIC | Age: 30
End: 2020-01-31

## 2020-01-31 NOTE — TELEPHONE ENCOUNTER
[employer]    Member Name: YOUNG LEGGETTMONS  Member ID: [id number]  Social Security Number: [id number]  YOB: 1990  Address: [address] [place] [address] [address] [place] [address]  Phone Number:   Gender: Female  Responsible Member: YOUNG GROSS    Network: [employer]  Group: [employer] ([id number])  Benefit Level: 1

## 2020-02-05 ENCOUNTER — PATIENT OUTREACH (OUTPATIENT)
Dept: ADMINISTRATIVE | Facility: OTHER | Age: 30
End: 2020-02-05

## 2020-03-09 RX ORDER — ERGOCALCIFEROL 1.25 MG/1
CAPSULE ORAL
Qty: 4 CAPSULE | Refills: 2 | Status: ON HOLD | OUTPATIENT
Start: 2020-03-09 | End: 2020-12-04 | Stop reason: HOSPADM

## 2020-04-13 ENCOUNTER — TELEPHONE (OUTPATIENT)
Dept: FAMILY MEDICINE | Facility: CLINIC | Age: 30
End: 2020-04-13

## 2020-04-13 ENCOUNTER — PATIENT MESSAGE (OUTPATIENT)
Dept: FAMILY MEDICINE | Facility: CLINIC | Age: 30
End: 2020-04-13

## 2020-04-13 DIAGNOSIS — Z32.01 POSITIVE URINE PREGNANCY TEST: Primary | ICD-10-CM

## 2020-04-13 NOTE — TELEPHONE ENCOUNTER
----- Message from Perla Hanna sent at 4/13/2020  3:17 PM CDT -----  Contact: Patient [phone]  Type:  Patient Returning Call    Who Called: Patient    Who Left Message for Patient:  Not sure    Does the patient know what this is regarding?: Pregnancy Test    Would the patient rather a call back or a response via [facility]?  Call back    Best Call Back Number: [phone]

## 2020-04-13 NOTE — TELEPHONE ENCOUNTER
Blood work is already ordered  Please schedule for patient and reply through [facility] to let her know.

## 2020-04-15 ENCOUNTER — LAB VISIT (OUTPATIENT)
Dept: LAB | Facility: HOSPITAL | Age: 30
End: 2020-04-15
Attending: FAMILY MEDICINE
Payer: COMMERCIAL

## 2020-04-15 DIAGNOSIS — Z32.01 POSITIVE URINE PREGNANCY TEST: ICD-10-CM

## 2020-04-15 DIAGNOSIS — Z34.90 PREGNANCY, UNSPECIFIED GESTATIONAL AGE: Primary | ICD-10-CM

## 2020-04-15 LAB — HCG INTACT+B SERPL-ACNC: NORMAL MIU/ML

## 2020-04-15 PROCEDURE — 84702 CHORIONIC GONADOTROPIN TEST: CPT

## 2020-04-15 PROCEDURE — 36415 COLL VENOUS BLD VENIPUNCTURE: CPT | Mod: PO

## 2020-04-21 DIAGNOSIS — Z01.84 ANTIBODY RESPONSE EXAMINATION: ICD-10-CM

## 2020-04-22 ENCOUNTER — TELEPHONE (OUTPATIENT)
Dept: OBSTETRICS AND GYNECOLOGY | Facility: CLINIC | Age: 30
End: 2020-04-22

## 2020-04-22 NOTE — TELEPHONE ENCOUNTER
Called pt and lmtrc. We need to know pt's lmp so that appointment can be scheduled appropriately. Once we get this we will either keep scheduled appts or reschedule.

## 2020-04-22 NOTE — TELEPHONE ENCOUNTER
----- Message from Samira Mulligan sent at 4/22/2020 11:24 AM CDT -----  Contact: [phone]/ self   Patient called in returning your call. Please advise.

## 2020-04-22 NOTE — TELEPHONE ENCOUNTER
Called pt and lvm. Pt scheduled self via portal. Pt had hcg done recently. I scheduled pt for 5/8/2020 for u/s at 1 p.m and visit with us right after. Please place u/s order in. PT will be informed of update appts via portal. Please advise

## 2020-04-28 DIAGNOSIS — N91.2 AMENORRHEA: Primary | ICD-10-CM

## 2020-04-30 ENCOUNTER — LAB VISIT (OUTPATIENT)
Dept: LAB | Facility: HOSPITAL | Age: 30
End: 2020-04-30
Attending: INTERNAL MEDICINE
Payer: COMMERCIAL

## 2020-04-30 DIAGNOSIS — Z01.84 ANTIBODY RESPONSE EXAMINATION: ICD-10-CM

## 2020-04-30 LAB — SARS-COV-2 IGG SERPLBLD QL IA.RAPID: NEGATIVE

## 2020-04-30 PROCEDURE — 86769 SARS-COV-2 COVID-19 ANTIBODY: CPT

## 2020-04-30 PROCEDURE — 36415 COLL VENOUS BLD VENIPUNCTURE: CPT

## 2020-05-06 ENCOUNTER — PATIENT OUTREACH (OUTPATIENT)
Dept: ADMINISTRATIVE | Facility: OTHER | Age: 30
End: 2020-05-06

## 2020-05-08 ENCOUNTER — OFFICE VISIT (OUTPATIENT)
Dept: OBSTETRICS AND GYNECOLOGY | Facility: CLINIC | Age: 30
End: 2020-05-08
Payer: COMMERCIAL

## 2020-05-08 ENCOUNTER — TELEPHONE (OUTPATIENT)
Dept: OBSTETRICS AND GYNECOLOGY | Facility: CLINIC | Age: 30
End: 2020-05-08

## 2020-05-08 ENCOUNTER — PROCEDURE VISIT (OUTPATIENT)
Dept: OBSTETRICS AND GYNECOLOGY | Facility: CLINIC | Age: 30
End: 2020-05-08
Payer: COMMERCIAL

## 2020-05-08 ENCOUNTER — LAB VISIT (OUTPATIENT)
Dept: LAB | Facility: HOSPITAL | Age: 30
End: 2020-05-08
Attending: OBSTETRICS & GYNECOLOGY
Payer: COMMERCIAL

## 2020-05-08 VITALS
BODY MASS INDEX: 25.01 KG/M2 | DIASTOLIC BLOOD PRESSURE: 70 MMHG | HEIGHT: 65 IN | SYSTOLIC BLOOD PRESSURE: 120 MMHG | WEIGHT: 150.13 LBS

## 2020-05-08 DIAGNOSIS — N91.2 AMENORRHEA: ICD-10-CM

## 2020-05-08 DIAGNOSIS — N91.2 AMENORRHEA: Primary | ICD-10-CM

## 2020-05-08 DIAGNOSIS — R11.0 NAUSEA: ICD-10-CM

## 2020-05-08 LAB
ABO + RH BLD: NORMAL
BASOPHILS # BLD AUTO: 0.03 K/UL (ref 0–0.2)
BASOPHILS NFR BLD: 0.4 % (ref 0–1.9)
BLD GP AB SCN CELLS X3 SERPL QL: NORMAL
DIFFERENTIAL METHOD: ABNORMAL
EOSINOPHIL # BLD AUTO: 0 K/UL (ref 0–0.5)
EOSINOPHIL NFR BLD: 0.4 % (ref 0–8)
ERYTHROCYTE [DISTWIDTH] IN BLOOD BY AUTOMATED COUNT: 11.8 % (ref 11.5–14.5)
HCT VFR BLD AUTO: 33.8 % (ref 37–48.5)
HGB BLD-MCNC: 11.9 G/DL (ref 12–16)
IMM GRANULOCYTES # BLD AUTO: 0.02 K/UL (ref 0–0.04)
IMM GRANULOCYTES NFR BLD AUTO: 0.3 % (ref 0–0.5)
LYMPHOCYTES # BLD AUTO: 1.4 K/UL (ref 1–4.8)
LYMPHOCYTES NFR BLD: 21.4 % (ref 18–48)
MCH RBC QN AUTO: 29.3 PG (ref 27–31)
MCHC RBC AUTO-ENTMCNC: 35.2 G/DL (ref 32–36)
MCV RBC AUTO: 83 FL (ref 82–98)
MONOCYTES # BLD AUTO: 0.6 K/UL (ref 0.3–1)
MONOCYTES NFR BLD: 8.5 % (ref 4–15)
NEUTROPHILS # BLD AUTO: 4.7 K/UL (ref 1.8–7.7)
NEUTROPHILS NFR BLD: 69 % (ref 38–73)
NRBC BLD-RTO: 0 /100 WBC
PLATELET # BLD AUTO: 249 K/UL (ref 150–350)
PMV BLD AUTO: 9.2 FL (ref 9.2–12.9)
RBC # BLD AUTO: 4.06 M/UL (ref 4–5.4)
WBC # BLD AUTO: 6.74 K/UL (ref 3.9–12.7)

## 2020-05-08 PROCEDURE — 99999 PR PBB SHADOW E&M-EST. PATIENT-LVL III: CPT | Mod: PBBFAC,,, | Performed by: OBSTETRICS & GYNECOLOGY

## 2020-05-08 PROCEDURE — 87481 CANDIDA DNA AMP PROBE: CPT | Mod: 59

## 2020-05-08 PROCEDURE — 76801 OB US < 14 WKS SINGLE FETUS: CPT | Mod: S$GLB,,, | Performed by: OBSTETRICS & GYNECOLOGY

## 2020-05-08 PROCEDURE — 81220 CFTR GENE COM VARIANTS: CPT

## 2020-05-08 PROCEDURE — 87491 CHLMYD TRACH DNA AMP PROBE: CPT

## 2020-05-08 PROCEDURE — 87086 URINE CULTURE/COLONY COUNT: CPT

## 2020-05-08 PROCEDURE — 86762 RUBELLA ANTIBODY: CPT

## 2020-05-08 PROCEDURE — 86787 VARICELLA-ZOSTER ANTIBODY: CPT

## 2020-05-08 PROCEDURE — 86703 HIV-1/HIV-2 1 RESULT ANTBDY: CPT

## 2020-05-08 PROCEDURE — 0500F PR INITIAL PRENATAL CARE VISIT: ICD-10-PCS | Mod: S$GLB,,, | Performed by: OBSTETRICS & GYNECOLOGY

## 2020-05-08 PROCEDURE — 99999 PR PBB SHADOW E&M-EST. PATIENT-LVL III: ICD-10-PCS | Mod: PBBFAC,,, | Performed by: OBSTETRICS & GYNECOLOGY

## 2020-05-08 PROCEDURE — 76801 PR US, OB <14WKS, TRANSABD, SINGLE GESTATION: ICD-10-PCS | Mod: S$GLB,,, | Performed by: OBSTETRICS & GYNECOLOGY

## 2020-05-08 PROCEDURE — 86901 BLOOD TYPING SEROLOGIC RH(D): CPT

## 2020-05-08 PROCEDURE — 86592 SYPHILIS TEST NON-TREP QUAL: CPT

## 2020-05-08 PROCEDURE — 83020 HEMOGLOBIN ELECTROPHORESIS: CPT

## 2020-05-08 PROCEDURE — 85025 COMPLETE CBC W/AUTO DIFF WBC: CPT

## 2020-05-08 PROCEDURE — 0500F INITIAL PRENATAL CARE VISIT: CPT | Mod: S$GLB,,, | Performed by: OBSTETRICS & GYNECOLOGY

## 2020-05-08 PROCEDURE — 87340 HEPATITIS B SURFACE AG IA: CPT

## 2020-05-08 RX ORDER — ONDANSETRON 4 MG/1
4 TABLET, ORALLY DISINTEGRATING ORAL EVERY 8 HOURS PRN
Qty: 30 TABLET | Refills: 2 | Status: SHIPPED | OUTPATIENT
Start: 2020-05-08 | End: 2020-06-17 | Stop reason: SDUPTHER

## 2020-05-08 NOTE — PROGRESS NOTES
"31 yo  female with amenorrhea who presents to confirm her pregnancy.    PMH: HSV 2  Gynhx: HSV 2, no h/o abl pap smears  Social: denies t/d/e  Obhx: first pregnancy  Surgery: denies    ROS:   GENERAL: Denies weight gain or weight loss. Feeling well overall.  nausea  SKIN: Denies rash or lesions.   HEAD: Denies head injury or headache.   CHEST: Denies chest pain or shortness of breath.   CARDIOVASCULAR: Denies palpitations or left sided chest pain.   ABDOMEN: No abdominal pain, constipation, diarrhea, nausea, vomiting or rectal bleeding.   URINARY: No frequency, dysuria, hematuria, or burning on urination.  REPRODUCTIVE: vaginal discharge  BREASTS:  denies pain, lumps, or nipple discharge.   HEMATOLOGIC: No easy bruisability or excessive bleeding.   MUSCULOSKELETAL: Denies joint pain or swelling.   NEUROLOGIC: Denies syncope or weakness.   PSYCHIATRIC: Denies depression, anxiety or mood swings.         PE:   Vitals: /70   Ht 5' 5" (1.651 m)   Wt 68.1 kg (150 lb 2.1 oz)   LMP 2020   BMI 24.98 kg/m²   APPEARANCE: Well nourished, well developed, in no acute distress.  sKIN: Normal skin turgor, no lesions.  CHEST: Lungs clear to auscultation.  HEART: Regular rate and rhythm, no murmurs, rubs or gallops.  ABDOMEN: Soft. No tenderness or masses. No hepatosplenomegaly. No hernias.  PELVIC: Normal external female genitalia without lesions. Normal hair distribution. Adequate perineal body, normal urethral meatus. Vagina moist and well rugated without lesions, copious white discharge. Cervix pink and without lesions. No significant cystocele or rectocele. Bimanual exam deferred      31 yo  female @ 10 wks by 10 wk sono (EDC 2020) who presents for new OB visit.    1) SIUP  S/p official dating scan  New OB labs today  Pap from 2019 wnl  rx for zofran sent  Wants quad screen    2) h/o HSV 2    s MD lm  "

## 2020-05-08 NOTE — TELEPHONE ENCOUNTER
----- Message from Jolly Phillips sent at 5/8/2020  9:28 AM CDT -----  Contact: self, [phone]  Patient is scheduled to have an ultrasound today and wants to know if she can have a visitor come in. Please call.

## 2020-05-08 NOTE — LETTER
May 8, 2020      Gianna Porter MD  [address]  [place] [address]           [facility]  [address]  [facility] [address]  Phone: [phone]          Patient: Demario Christine   MR Number: [id number]   YOB: 1990   Date of Visit: 5/8/2020       Dear Dr. Gianna Porter:    Thank you for referring Demario Christine to me for evaluation. Attached you will find relevant portions of my assessment and plan of care.    If you have questions, please do not hesitate to call me. I look forward to following Demario Christine along with you.    Sincerely,    Rae Jimenez MD    Enclosure  CC:  No Recipients    If you would like to receive this communication electronically, please contact [email or web address] or [phone] to request more information on [facility] Link access.    For providers and/or their staff who would like to refer a patient to [facility], please contact us through our one-stop-shop provider referral line, [facility] [place], at [phone].    If you feel you have received this communication in error or would no longer like to receive these types of communications, please e-mail [email or web address]

## 2020-05-08 NOTE — TELEPHONE ENCOUNTER
Called patient, patient stated she was calling to see if someone could come to her appointment with her. Advised patient due to COVID-19 they are only allowing the patient in the building. Patient verbalized understanding.

## 2020-05-09 LAB — RPR SER QL: NORMAL

## 2020-05-10 LAB
BACTERIA UR CULT: NORMAL
RUBV IGG SER-ACNC: 38.9 IU/ML
RUBV IGG SER-IMP: REACTIVE

## 2020-05-11 LAB
BACTERIAL VAGINOSIS DNA: POSITIVE
CANDIDA GLABRATA DNA: NEGATIVE
CANDIDA KRUSEI DNA: NEGATIVE
CANDIDA RRNA VAG QL PROBE: NEGATIVE
CFTR MUT ANL BLD/T: NORMAL
HBV SURFACE AG SERPL QL IA: NEGATIVE
HGB A2 MFR BLD HPLC: 2.7 % (ref 2.2–3.2)
HGB FRACT BLD ELPH-IMP: NORMAL
HGB FRACT BLD ELPH-IMP: NORMAL
HIV 1+2 AB+HIV1 P24 AG SERPL QL IA: NEGATIVE
T VAGINALIS RRNA GENITAL QL PROBE: NEGATIVE
VARICELLA INTERPRETATION: POSITIVE
VARICELLA ZOSTER IGG: 4.08 ISR (ref 0–0.9)

## 2020-05-14 ENCOUNTER — TELEPHONE (OUTPATIENT)
Dept: OPTOMETRY | Facility: CLINIC | Age: 30
End: 2020-05-14

## 2020-05-14 LAB
C TRACH DNA SPEC QL NAA+PROBE: NOT DETECTED
N GONORRHOEA DNA SPEC QL NAA+PROBE: NOT DETECTED

## 2020-05-14 NOTE — TELEPHONE ENCOUNTER
[employer]    Member Name: YOUNG LEGGETTMONS  Member ID: [id number]  Social Security Number: [id number]  YOB: 1990  Address: [address] [place] [address] [address] [place] [address]  Phone Number:   Gender: Female  Responsible Member: YOUNG GROSS    Network: [employer]  Group: [employer] ([id number])  Benefit Level: 1

## 2020-05-16 ENCOUNTER — PATIENT MESSAGE (OUTPATIENT)
Dept: OBSTETRICS AND GYNECOLOGY | Facility: CLINIC | Age: 30
End: 2020-05-16

## 2020-05-18 ENCOUNTER — PATIENT OUTREACH (OUTPATIENT)
Dept: ADMINISTRATIVE | Facility: OTHER | Age: 30
End: 2020-05-18

## 2020-05-19 DIAGNOSIS — R30.0 DYSURIA DURING PREGNANCY IN FIRST TRIMESTER: Primary | ICD-10-CM

## 2020-05-19 DIAGNOSIS — O26.891 DYSURIA DURING PREGNANCY IN FIRST TRIMESTER: Primary | ICD-10-CM

## 2020-05-19 RX ORDER — CEPHALEXIN 500 MG/1
500 CAPSULE ORAL EVERY 12 HOURS
Qty: 20 CAPSULE | Refills: 0 | Status: SHIPPED | OUTPATIENT
Start: 2020-05-19 | End: 2020-05-29

## 2020-05-19 NOTE — PROGRESS NOTES
rx for medication called keflex sent to treat possible urine infection.  Safe with pregnancy.    sheryl amato MD

## 2020-05-27 ENCOUNTER — PATIENT MESSAGE (OUTPATIENT)
Dept: OBSTETRICS AND GYNECOLOGY | Facility: CLINIC | Age: 30
End: 2020-05-27

## 2020-05-27 DIAGNOSIS — B00.9 HSV INFECTION: Primary | ICD-10-CM

## 2020-05-27 RX ORDER — ACYCLOVIR 800 MG/1
800 TABLET ORAL 2 TIMES DAILY
Qty: 30 TABLET | Refills: 3 | Status: SHIPPED | OUTPATIENT
Start: 2020-05-27 | End: 2020-06-01

## 2020-06-08 ENCOUNTER — PATIENT OUTREACH (OUTPATIENT)
Dept: ADMINISTRATIVE | Facility: OTHER | Age: 30
End: 2020-06-08

## 2020-06-12 ENCOUNTER — LAB VISIT (OUTPATIENT)
Dept: LAB | Facility: HOSPITAL | Age: 30
End: 2020-06-12
Attending: OBSTETRICS & GYNECOLOGY
Payer: COMMERCIAL

## 2020-06-12 ENCOUNTER — ROUTINE PRENATAL (OUTPATIENT)
Dept: OBSTETRICS AND GYNECOLOGY | Facility: CLINIC | Age: 30
End: 2020-06-12
Payer: COMMERCIAL

## 2020-06-12 VITALS
DIASTOLIC BLOOD PRESSURE: 60 MMHG | BODY MASS INDEX: 25.02 KG/M2 | WEIGHT: 150.38 LBS | SYSTOLIC BLOOD PRESSURE: 122 MMHG

## 2020-06-12 DIAGNOSIS — Z3A.15 15 WEEKS GESTATION OF PREGNANCY: ICD-10-CM

## 2020-06-12 DIAGNOSIS — Z34.02 ENCOUNTER FOR SUPERVISION OF NORMAL FIRST PREGNANCY IN SECOND TRIMESTER: Primary | ICD-10-CM

## 2020-06-12 DIAGNOSIS — B96.89 BV (BACTERIAL VAGINOSIS): ICD-10-CM

## 2020-06-12 DIAGNOSIS — Z34.02 ENCOUNTER FOR SUPERVISION OF NORMAL FIRST PREGNANCY IN SECOND TRIMESTER: ICD-10-CM

## 2020-06-12 DIAGNOSIS — N76.0 BV (BACTERIAL VAGINOSIS): ICD-10-CM

## 2020-06-12 LAB
ALBUMIN SERPL BCP-MCNC: 3.4 G/DL (ref 3.5–5.2)
ALP SERPL-CCNC: 45 U/L (ref 55–135)
ALT SERPL W/O P-5'-P-CCNC: 7 U/L (ref 10–44)
ANION GAP SERPL CALC-SCNC: 4 MMOL/L (ref 8–16)
AST SERPL-CCNC: 16 U/L (ref 10–40)
BASOPHILS # BLD AUTO: 0.03 K/UL (ref 0–0.2)
BASOPHILS NFR BLD: 0.4 % (ref 0–1.9)
BILIRUB SERPL-MCNC: 0.4 MG/DL (ref 0.1–1)
BUN SERPL-MCNC: 5 MG/DL (ref 6–20)
CALCIUM SERPL-MCNC: 9.4 MG/DL (ref 8.7–10.5)
CHLORIDE SERPL-SCNC: 107 MMOL/L (ref 95–110)
CO2 SERPL-SCNC: 24 MMOL/L (ref 23–29)
CREAT SERPL-MCNC: 0.6 MG/DL (ref 0.5–1.4)
DIFFERENTIAL METHOD: ABNORMAL
EOSINOPHIL # BLD AUTO: 0 K/UL (ref 0–0.5)
EOSINOPHIL NFR BLD: 0.6 % (ref 0–8)
ERYTHROCYTE [DISTWIDTH] IN BLOOD BY AUTOMATED COUNT: 12 % (ref 11.5–14.5)
EST. GFR  (AFRICAN AMERICAN): >60 ML/MIN/1.73 M^2
EST. GFR  (NON AFRICAN AMERICAN): >60 ML/MIN/1.73 M^2
ESTIMATED AVG GLUCOSE: 80 MG/DL (ref 68–131)
GLUCOSE SERPL-MCNC: 68 MG/DL (ref 70–110)
HBA1C MFR BLD HPLC: 4.4 % (ref 4–5.6)
HCT VFR BLD AUTO: 33.6 % (ref 37–48.5)
HGB BLD-MCNC: 11.8 G/DL (ref 12–16)
IMM GRANULOCYTES # BLD AUTO: 0.02 K/UL (ref 0–0.04)
IMM GRANULOCYTES NFR BLD AUTO: 0.3 % (ref 0–0.5)
LYMPHOCYTES # BLD AUTO: 1.4 K/UL (ref 1–4.8)
LYMPHOCYTES NFR BLD: 20.1 % (ref 18–48)
MCH RBC QN AUTO: 29.6 PG (ref 27–31)
MCHC RBC AUTO-ENTMCNC: 35.1 G/DL (ref 32–36)
MCV RBC AUTO: 84 FL (ref 82–98)
MONOCYTES # BLD AUTO: 0.6 K/UL (ref 0.3–1)
MONOCYTES NFR BLD: 9.1 % (ref 4–15)
NEUTROPHILS # BLD AUTO: 4.7 K/UL (ref 1.8–7.7)
NEUTROPHILS NFR BLD: 69.5 % (ref 38–73)
NRBC BLD-RTO: 0 /100 WBC
PLATELET # BLD AUTO: 241 K/UL (ref 150–350)
PMV BLD AUTO: 9.5 FL (ref 9.2–12.9)
POTASSIUM SERPL-SCNC: 4.5 MMOL/L (ref 3.5–5.1)
PROT SERPL-MCNC: 6.7 G/DL (ref 6–8.4)
RBC # BLD AUTO: 3.98 M/UL (ref 4–5.4)
SODIUM SERPL-SCNC: 135 MMOL/L (ref 136–145)
WBC # BLD AUTO: 6.8 K/UL (ref 3.9–12.7)

## 2020-06-12 PROCEDURE — 0502F PR SUBSEQUENT PRENATAL CARE: ICD-10-PCS | Mod: CPTII,S$GLB,, | Performed by: OBSTETRICS & GYNECOLOGY

## 2020-06-12 PROCEDURE — 36415 COLL VENOUS BLD VENIPUNCTURE: CPT

## 2020-06-12 PROCEDURE — 83036 HEMOGLOBIN GLYCOSYLATED A1C: CPT

## 2020-06-12 PROCEDURE — 0502F SUBSEQUENT PRENATAL CARE: CPT | Mod: CPTII,S$GLB,, | Performed by: OBSTETRICS & GYNECOLOGY

## 2020-06-12 PROCEDURE — 80053 COMPREHEN METABOLIC PANEL: CPT

## 2020-06-12 PROCEDURE — 81511 FTL CGEN ABNOR FOUR ANAL: CPT

## 2020-06-12 PROCEDURE — 99999 PR PBB SHADOW E&M-EST. PATIENT-LVL II: CPT | Mod: PBBFAC,,, | Performed by: OBSTETRICS & GYNECOLOGY

## 2020-06-12 PROCEDURE — 85025 COMPLETE CBC W/AUTO DIFF WBC: CPT

## 2020-06-12 PROCEDURE — 99999 PR PBB SHADOW E&M-EST. PATIENT-LVL II: ICD-10-PCS | Mod: PBBFAC,,, | Performed by: OBSTETRICS & GYNECOLOGY

## 2020-06-12 RX ORDER — METRONIDAZOLE 7.5 MG/G
1 GEL VAGINAL NIGHTLY
Qty: 70 G | Refills: 3 | Status: SHIPPED | OUTPATIENT
Start: 2020-06-12 | End: 2020-06-19

## 2020-06-12 RX ORDER — ACYCLOVIR 800 MG/1
800 TABLET ORAL
Status: ON HOLD | COMMUNITY
Start: 2020-06-09 | End: 2020-12-04 | Stop reason: HOSPADM

## 2020-06-12 NOTE — PROGRESS NOTES
The patient is concerned about poor appetite and weight gain  Feeling thirsty and light headed.  metrogel for BV    29 yo  female @ 15 wks by 10 wk cherelle (EDC 2020) who presents for new OB visit.    1) SIUP  S/p official dating scan  Rh positive  Pap from 2019 wnl  Wants quad screen ordered    2) h/o HSV 2    F/u in 5 wks anatomy, consents    sheryl amato MD

## 2020-06-16 LAB
# FETUSES US: NORMAL
2ND TRIMESTER 4 SCREEN PNL SERPL: NEGATIVE
2ND TRIMESTER 4 SCREEN SERPL-IMP: NORMAL
AFP MOM SERPL: 0.67
AFP SERPL-MCNC: 20.5 NG/ML
AGE AT DELIVERY: 30
B-HCG MOM SERPL: 0.41
B-HCG SERPL-ACNC: 23.5 IU/ML
FET TS 21 RISK FROM MAT AGE: NORMAL
GA (DAYS): 0 D
GA (WEEKS): 15 WK
GA METHOD: NORMAL
IDDM PATIENT QL: NORMAL
INHIBIN A MOM SERPL: 0.77
INHIBIN A SERPL-MCNC: 136.3 PG/ML
SMOKING STATUS FTND: NO
TS 18 RISK FETUS: NORMAL
TS 21 RISK FETUS: NORMAL
U ESTRIOL MOM SERPL: 1.2
U ESTRIOL SERPL-MCNC: 0.74 NG/ML

## 2020-06-17 ENCOUNTER — TELEPHONE (OUTPATIENT)
Dept: OBSTETRICS AND GYNECOLOGY | Facility: CLINIC | Age: 30
End: 2020-06-17

## 2020-06-17 DIAGNOSIS — N91.2 AMENORRHEA: ICD-10-CM

## 2020-06-17 DIAGNOSIS — R11.0 NAUSEA: ICD-10-CM

## 2020-06-17 RX ORDER — ONDANSETRON 4 MG/1
4 TABLET, ORALLY DISINTEGRATING ORAL EVERY 8 HOURS PRN
Qty: 30 TABLET | Refills: 2 | Status: ON HOLD | OUTPATIENT
Start: 2020-06-17 | End: 2020-12-04 | Stop reason: HOSPADM

## 2020-07-16 ENCOUNTER — PATIENT OUTREACH (OUTPATIENT)
Dept: ADMINISTRATIVE | Facility: OTHER | Age: 30
End: 2020-07-16

## 2020-07-16 NOTE — PROGRESS NOTES
Requested updates within Care Everywhere.  Patient's chart was reviewed for overdue [facility] topics.  Immunizations reconciled.    Orders placed:  Tasked appts:  Labs Linked:

## 2020-07-20 ENCOUNTER — ROUTINE PRENATAL (OUTPATIENT)
Dept: OBSTETRICS AND GYNECOLOGY | Facility: CLINIC | Age: 30
End: 2020-07-20
Payer: COMMERCIAL

## 2020-07-20 ENCOUNTER — PROCEDURE VISIT (OUTPATIENT)
Dept: OBSTETRICS AND GYNECOLOGY | Facility: CLINIC | Age: 30
End: 2020-07-20
Payer: COMMERCIAL

## 2020-07-20 VITALS
DIASTOLIC BLOOD PRESSURE: 70 MMHG | SYSTOLIC BLOOD PRESSURE: 110 MMHG | WEIGHT: 166.88 LBS | BODY MASS INDEX: 27.77 KG/M2

## 2020-07-20 DIAGNOSIS — Z3A.15 15 WEEKS GESTATION OF PREGNANCY: ICD-10-CM

## 2020-07-20 DIAGNOSIS — Z36.89 ENCOUNTER FOR FETAL ANATOMIC SURVEY: ICD-10-CM

## 2020-07-20 DIAGNOSIS — Z34.83 ENCOUNTER FOR SUPERVISION OF OTHER NORMAL PREGNANCY IN THIRD TRIMESTER: Primary | ICD-10-CM

## 2020-07-20 DIAGNOSIS — Z3A.20 20 WEEKS GESTATION OF PREGNANCY: ICD-10-CM

## 2020-07-20 DIAGNOSIS — Z34.02 ENCOUNTER FOR SUPERVISION OF NORMAL FIRST PREGNANCY IN SECOND TRIMESTER: ICD-10-CM

## 2020-07-20 PROCEDURE — 0502F SUBSEQUENT PRENATAL CARE: CPT | Mod: CPTII,S$GLB,, | Performed by: OBSTETRICS & GYNECOLOGY

## 2020-07-20 PROCEDURE — 76805 PR US, OB 14+WKS, TRANSABD, SINGLE GESTATION: ICD-10-PCS | Mod: S$GLB,,, | Performed by: OBSTETRICS & GYNECOLOGY

## 2020-07-20 PROCEDURE — 76805 OB US >/= 14 WKS SNGL FETUS: CPT | Mod: S$GLB,,, | Performed by: OBSTETRICS & GYNECOLOGY

## 2020-07-20 PROCEDURE — 99999 PR PBB SHADOW E&M-EST. PATIENT-LVL II: ICD-10-PCS | Mod: PBBFAC,,, | Performed by: OBSTETRICS & GYNECOLOGY

## 2020-07-20 PROCEDURE — 0502F PR SUBSEQUENT PRENATAL CARE: ICD-10-PCS | Mod: CPTII,S$GLB,, | Performed by: OBSTETRICS & GYNECOLOGY

## 2020-07-20 PROCEDURE — 99999 PR PBB SHADOW E&M-EST. PATIENT-LVL II: CPT | Mod: PBBFAC,,, | Performed by: OBSTETRICS & GYNECOLOGY

## 2020-07-20 NOTE — PROGRESS NOTES
Lots of questions about delivery answered today.    31 yo  female @ 20.3 wks by 10 wk sono (EDC 2020) who presents for f/u OB visit.    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

## 2020-07-27 ENCOUNTER — PATIENT OUTREACH (OUTPATIENT)
Dept: ADMINISTRATIVE | Facility: OTHER | Age: 30
End: 2020-07-27

## 2020-07-27 NOTE — PROGRESS NOTES
Requested updates within Care Everywhere.  Patient's chart was reviewed for overdue [facility] topics.  Immunizations reconciled.

## 2020-08-12 ENCOUNTER — PATIENT OUTREACH (OUTPATIENT)
Dept: ADMINISTRATIVE | Facility: OTHER | Age: 30
End: 2020-08-12

## 2020-08-12 NOTE — PROGRESS NOTES
Health Maintenance Due   Topic Date Due    Pneumococcal Vaccine (Medium Risk) (1 of 1 - PPSV23) 03/28/2009     Updates were requested from care everywhere.  Chart was reviewed for overdue Proactive [facility] Encounters ([facility]) topics (CRS, Breast Cancer Screening, Eye exam)  Health Maintenance has been updated.  LINKS immunization registry triggered.  Immunizations were reconciled.

## 2020-08-17 ENCOUNTER — ROUTINE PRENATAL (OUTPATIENT)
Dept: OBSTETRICS AND GYNECOLOGY | Facility: CLINIC | Age: 30
End: 2020-08-17
Payer: COMMERCIAL

## 2020-08-17 ENCOUNTER — PROCEDURE VISIT (OUTPATIENT)
Dept: OBSTETRICS AND GYNECOLOGY | Facility: CLINIC | Age: 30
End: 2020-08-17
Payer: COMMERCIAL

## 2020-08-17 VITALS
DIASTOLIC BLOOD PRESSURE: 78 MMHG | SYSTOLIC BLOOD PRESSURE: 110 MMHG | WEIGHT: 178.56 LBS | BODY MASS INDEX: 29.72 KG/M2

## 2020-08-17 DIAGNOSIS — Z34.83 ENCOUNTER FOR SUPERVISION OF OTHER NORMAL PREGNANCY IN THIRD TRIMESTER: ICD-10-CM

## 2020-08-17 DIAGNOSIS — Z3A.24 24 WEEKS GESTATION OF PREGNANCY: ICD-10-CM

## 2020-08-17 DIAGNOSIS — Z34.02 ENCOUNTER FOR SUPERVISION OF NORMAL FIRST PREGNANCY IN SECOND TRIMESTER: Primary | ICD-10-CM

## 2020-08-17 PROCEDURE — 76816 PR  US,PREGNANT UTERUS,F/U,TRANSABD APP: ICD-10-PCS | Mod: S$GLB,,, | Performed by: OBSTETRICS & GYNECOLOGY

## 2020-08-17 PROCEDURE — 76816 OB US FOLLOW-UP PER FETUS: CPT | Mod: S$GLB,,, | Performed by: OBSTETRICS & GYNECOLOGY

## 2020-08-17 PROCEDURE — 99999 PR PBB SHADOW E&M-EST. PATIENT-LVL II: CPT | Mod: PBBFAC,,, | Performed by: OBSTETRICS & GYNECOLOGY

## 2020-08-17 PROCEDURE — 99999 PR PBB SHADOW E&M-EST. PATIENT-LVL II: ICD-10-PCS | Mod: PBBFAC,,, | Performed by: OBSTETRICS & GYNECOLOGY

## 2020-08-17 PROCEDURE — 0502F SUBSEQUENT PRENATAL CARE: CPT | Mod: CPTII,S$GLB,, | Performed by: OBSTETRICS & GYNECOLOGY

## 2020-08-17 PROCEDURE — 0502F PR SUBSEQUENT PRENATAL CARE: ICD-10-PCS | Mod: CPTII,S$GLB,, | Performed by: OBSTETRICS & GYNECOLOGY

## 2020-08-17 NOTE — PROGRESS NOTES
Good fetal movement. Reports back pain. Can try maternity belt.  Baby still breech    31 yo  female @ 24.3 wks by 10 wk sono (EDC 2020) who presents for f/u OB visit.    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

## 2020-08-20 ENCOUNTER — LAB VISIT (OUTPATIENT)
Dept: LAB | Facility: HOSPITAL | Age: 30
End: 2020-08-20
Attending: OBSTETRICS & GYNECOLOGY
Payer: COMMERCIAL

## 2020-08-20 DIAGNOSIS — Z3A.24 24 WEEKS GESTATION OF PREGNANCY: ICD-10-CM

## 2020-08-20 DIAGNOSIS — Z34.02 ENCOUNTER FOR SUPERVISION OF NORMAL FIRST PREGNANCY IN SECOND TRIMESTER: ICD-10-CM

## 2020-08-20 LAB
BASOPHILS # BLD AUTO: 0.03 K/UL (ref 0–0.2)
BASOPHILS NFR BLD: 0.3 % (ref 0–1.9)
DIFFERENTIAL METHOD: ABNORMAL
EOSINOPHIL # BLD AUTO: 0.1 K/UL (ref 0–0.5)
EOSINOPHIL NFR BLD: 1 % (ref 0–8)
ERYTHROCYTE [DISTWIDTH] IN BLOOD BY AUTOMATED COUNT: 12 % (ref 11.5–14.5)
GLUCOSE SERPL-MCNC: 56 MG/DL (ref 70–140)
HCT VFR BLD AUTO: 31.2 % (ref 37–48.5)
HGB BLD-MCNC: 11.1 G/DL (ref 12–16)
IMM GRANULOCYTES # BLD AUTO: 0.09 K/UL (ref 0–0.04)
IMM GRANULOCYTES NFR BLD AUTO: 1 % (ref 0–0.5)
LYMPHOCYTES # BLD AUTO: 1.3 K/UL (ref 1–4.8)
LYMPHOCYTES NFR BLD: 14.5 % (ref 18–48)
MCH RBC QN AUTO: 31.2 PG (ref 27–31)
MCHC RBC AUTO-ENTMCNC: 35.6 G/DL (ref 32–36)
MCV RBC AUTO: 88 FL (ref 82–98)
MONOCYTES # BLD AUTO: 1 K/UL (ref 0.3–1)
MONOCYTES NFR BLD: 10.7 % (ref 4–15)
NEUTROPHILS # BLD AUTO: 6.5 K/UL (ref 1.8–7.7)
NEUTROPHILS NFR BLD: 72.5 % (ref 38–73)
NRBC BLD-RTO: 0 /100 WBC
PLATELET # BLD AUTO: 203 K/UL (ref 150–350)
PMV BLD AUTO: 9.3 FL (ref 9.2–12.9)
RBC # BLD AUTO: 3.56 M/UL (ref 4–5.4)
WBC # BLD AUTO: 9.01 K/UL (ref 3.9–12.7)

## 2020-08-20 PROCEDURE — 85025 COMPLETE CBC W/AUTO DIFF WBC: CPT

## 2020-08-20 PROCEDURE — 82950 GLUCOSE TEST: CPT

## 2020-08-20 PROCEDURE — 36415 COLL VENOUS BLD VENIPUNCTURE: CPT

## 2020-09-14 ENCOUNTER — ROUTINE PRENATAL (OUTPATIENT)
Dept: OBSTETRICS AND GYNECOLOGY | Facility: CLINIC | Age: 30
End: 2020-09-14
Payer: COMMERCIAL

## 2020-09-14 VITALS
SYSTOLIC BLOOD PRESSURE: 118 MMHG | WEIGHT: 190.06 LBS | DIASTOLIC BLOOD PRESSURE: 78 MMHG | BODY MASS INDEX: 31.62 KG/M2

## 2020-09-14 DIAGNOSIS — Z34.03 ENCOUNTER FOR SUPERVISION OF NORMAL FIRST PREGNANCY IN THIRD TRIMESTER: Primary | ICD-10-CM

## 2020-09-14 DIAGNOSIS — Z3A.28 28 WEEKS GESTATION OF PREGNANCY: ICD-10-CM

## 2020-09-14 PROCEDURE — 0502F SUBSEQUENT PRENATAL CARE: CPT | Mod: CPTII,S$GLB,, | Performed by: OBSTETRICS & GYNECOLOGY

## 2020-09-14 PROCEDURE — 99999 PR PBB SHADOW E&M-EST. PATIENT-LVL II: ICD-10-PCS | Mod: PBBFAC,,, | Performed by: OBSTETRICS & GYNECOLOGY

## 2020-09-14 PROCEDURE — 99999 PR PBB SHADOW E&M-EST. PATIENT-LVL II: CPT | Mod: PBBFAC,,, | Performed by: OBSTETRICS & GYNECOLOGY

## 2020-09-14 PROCEDURE — 0502F PR SUBSEQUENT PRENATAL CARE: ICD-10-PCS | Mod: CPTII,S$GLB,, | Performed by: OBSTETRICS & GYNECOLOGY

## 2020-09-14 NOTE — PROGRESS NOTES
Good fetal movement. Reports episodes of feeling weak.  Gave me FMLA papers to complete today.    29 yo  female @ 28.3 wks by 10 wk sono (EDC 2020) who presents for f/u OB visit.    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

## 2020-09-17 ENCOUNTER — PATIENT MESSAGE (OUTPATIENT)
Dept: OBSTETRICS AND GYNECOLOGY | Facility: CLINIC | Age: 30
End: 2020-09-17

## 2020-09-23 ENCOUNTER — OFFICE VISIT (OUTPATIENT)
Dept: OPTOMETRY | Facility: CLINIC | Age: 30
End: 2020-09-23
Payer: COMMERCIAL

## 2020-09-23 ENCOUNTER — PATIENT OUTREACH (OUTPATIENT)
Dept: ADMINISTRATIVE | Facility: OTHER | Age: 30
End: 2020-09-23

## 2020-09-23 DIAGNOSIS — H52.7 REFRACTIVE ERROR: ICD-10-CM

## 2020-09-23 DIAGNOSIS — Z01.00 EXAMINATION OF EYES AND VISION: Primary | ICD-10-CM

## 2020-09-23 PROCEDURE — 92015 PR REFRACTION: ICD-10-PCS | Mod: S$GLB,,, | Performed by: OPTOMETRIST

## 2020-09-23 PROCEDURE — 99999 PR PBB SHADOW E&M-EST. PATIENT-LVL II: ICD-10-PCS | Mod: PBBFAC,,, | Performed by: OPTOMETRIST

## 2020-09-23 PROCEDURE — 99999 PR PBB SHADOW E&M-EST. PATIENT-LVL II: CPT | Mod: PBBFAC,,, | Performed by: OPTOMETRIST

## 2020-09-23 PROCEDURE — 92015 DETERMINE REFRACTIVE STATE: CPT | Mod: S$GLB,,, | Performed by: OPTOMETRIST

## 2020-09-23 PROCEDURE — 92014 COMPRE OPH EXAM EST PT 1/>: CPT | Mod: S$GLB,,, | Performed by: OPTOMETRIST

## 2020-09-23 PROCEDURE — 92014 PR EYE EXAM, EST PATIENT,COMPREHESV: ICD-10-PCS | Mod: S$GLB,,, | Performed by: OPTOMETRIST

## 2020-09-23 NOTE — PROGRESS NOTES
Subjective:       Patient ID: Demario Christine is a 30 y.o. female      Chief Complaint   Patient presents with    Concerns About Ocular Health     History of Present Illness  Dls: 1/11/19 Dr. Harman    29 y/o female presents today with c/o blurry vision at distance and near ou off/on.   Pt wears single vision glasses.   Pt is 29 weeks pregnant.     No tearing  No itching  No burning  No pain  + ha's  No floaters  No flashes    Eye meds  None       Assessment/Plan:     1. Examination of eyes and vision  [employer] [facility]    2. Refractive error  Educated patient on refractive error and discussed lens options. Dispensed updated spectacle Rx. Educated about adaptation period to new specs.    Eyeglass Final Rx     Eyeglass Final Rx       Sphere Cylinder Axis    Right -1.50 +5.00 085    Left -1.00 +4.00 110    Type: SVL    Expiration Date: 9/24/2021                  Follow up in about 1 year (around 9/23/2021).

## 2020-09-28 ENCOUNTER — OFFICE VISIT (OUTPATIENT)
Dept: FAMILY MEDICINE | Facility: CLINIC | Age: 30
End: 2020-09-28
Payer: COMMERCIAL

## 2020-09-28 VITALS
TEMPERATURE: 99 F | BODY MASS INDEX: 32.47 KG/M2 | WEIGHT: 194.88 LBS | OXYGEN SATURATION: 98 % | HEART RATE: 96 BPM | HEIGHT: 65 IN | RESPIRATION RATE: 16 BRPM | SYSTOLIC BLOOD PRESSURE: 130 MMHG | DIASTOLIC BLOOD PRESSURE: 90 MMHG

## 2020-09-28 DIAGNOSIS — H60.393 OTHER INFECTIVE ACUTE OTITIS EXTERNA OF BOTH EARS: Primary | ICD-10-CM

## 2020-09-28 PROCEDURE — 99213 PR OFFICE/OUTPT VISIT, EST, LEVL III, 20-29 MIN: ICD-10-PCS | Mod: S$GLB,,, | Performed by: FAMILY MEDICINE

## 2020-09-28 PROCEDURE — 99999 PR PBB SHADOW E&M-EST. PATIENT-LVL III: CPT | Mod: PBBFAC,,, | Performed by: FAMILY MEDICINE

## 2020-09-28 PROCEDURE — 99999 PR PBB SHADOW E&M-EST. PATIENT-LVL III: ICD-10-PCS | Mod: PBBFAC,,, | Performed by: FAMILY MEDICINE

## 2020-09-28 PROCEDURE — 3008F BODY MASS INDEX DOCD: CPT | Mod: CPTII,S$GLB,, | Performed by: FAMILY MEDICINE

## 2020-09-28 PROCEDURE — 99213 OFFICE O/P EST LOW 20 MIN: CPT | Mod: S$GLB,,, | Performed by: FAMILY MEDICINE

## 2020-09-28 PROCEDURE — 3008F PR BODY MASS INDEX (BMI) DOCUMENTED: ICD-10-PCS | Mod: CPTII,S$GLB,, | Performed by: FAMILY MEDICINE

## 2020-09-28 RX ORDER — CIPROFLOXACIN AND DEXAMETHASONE 3; 1 MG/ML; MG/ML
4 SUSPENSION/ DROPS AURICULAR (OTIC) 2 TIMES DAILY
Qty: 7.5 ML | Refills: 0 | Status: SHIPPED | OUTPATIENT
Start: 2020-09-28 | End: 2020-09-28 | Stop reason: SDUPTHER

## 2020-09-28 RX ORDER — CIPROFLOXACIN AND DEXAMETHASONE 3; 1 MG/ML; MG/ML
4 SUSPENSION/ DROPS AURICULAR (OTIC) 2 TIMES DAILY
Qty: 7.5 ML | Refills: 0 | Status: ON HOLD | OUTPATIENT
Start: 2020-09-28 | End: 2020-12-04 | Stop reason: HOSPADM

## 2020-09-28 NOTE — PROGRESS NOTES
Chief Complaint   Patient presents with    Otalgia     both ear fullness and pain for past 3 days        Demario Christine is a 30 y.o. female who presents per the Chief Complaint.  Pt is known to me and was last seen by me on Visit date not found.  All known chronic medical issues have been documented.    Otalgia   There is pain in both (worse in right) ears. This is a new problem. The current episode started in the past 7 days. The problem occurs constantly. The problem has been unchanged. There has been no fever. The pain is at a severity of 4/10. The pain is moderate. Associated symptoms include hearing loss (diminished in right ear). Pertinent negatives include no abdominal pain, coughing, diarrhea, ear discharge, headaches, neck pain, rash, rhinorrhea, sore throat or vomiting. She has tried acetaminophen for the symptoms. There is no history of a chronic ear infection, hearing loss or a tympanostomy tube.     ROS  Review of Systems   Constitutional: Negative.  Negative for activity change, appetite change, chills, diaphoresis, fatigue, fever and unexpected weight change.   HENT: Positive for ear pain, hearing loss (diminished in right ear) and postnasal drip. Negative for congestion, dental problem, drooling, ear discharge, facial swelling, mouth sores, nosebleeds, rhinorrhea, sinus pressure, sinus pain, sneezing, sore throat, tinnitus, trouble swallowing and voice change.    Eyes: Negative for pain, discharge and visual disturbance.   Respiratory: Negative for cough, choking and shortness of breath.    Cardiovascular: Negative for chest pain and leg swelling.   Gastrointestinal: Negative for abdominal pain, constipation, diarrhea, nausea and vomiting.   Genitourinary: Negative for difficulty urinating, dysuria, flank pain, frequency and urgency.   Musculoskeletal: Negative.  Negative for arthralgias, back pain, gait problem, joint swelling, myalgias and neck pain.   Skin: Negative.  Negative for rash.  "  Allergic/Immunologic: Positive for environmental allergies. Negative for food allergies and immunocompromised state.   Neurological: Negative.  Negative for dizziness, seizures, syncope, weakness, light-headedness and headaches.   Psychiatric/Behavioral: Negative.  Negative for confusion, decreased concentration, dysphoric mood and sleep disturbance. The patient is not nervous/anxious.        Physical Exam  Vitals:    09/28/20 1137   BP: (!) 130/90   Pulse: 96   Resp: 16   Temp: 98.5 °F (36.9 °C)    Body mass index is 32.43 kg/m².  Weight: 88.4 kg (194 lb 14.2 oz)   Height: 5' 5" (165.1 cm)     Physical Exam  Constitutional:       Appearance: She is well-developed. She is not ill-appearing, toxic-appearing or diaphoretic.   HENT:      Head: Normocephalic and atraumatic.      Right Ear: Tympanic membrane and ear canal normal. Decreased hearing noted. Swelling and tenderness present. No laceration or drainage. No middle ear effusion. There is no impacted cerumen. No foreign body. No mastoid tenderness. Tympanic membrane is not injected, scarred, perforated, erythematous, retracted or bulging. Tympanic membrane has normal mobility.      Left Ear: Hearing, tympanic membrane and ear canal normal. No decreased hearing noted. Swelling and tenderness present. No laceration or drainage.  No middle ear effusion. There is no impacted cerumen. No foreign body. No mastoid tenderness. Tympanic membrane is not injected, scarred, perforated, erythematous, retracted or bulging. Tympanic membrane has normal mobility.      Ears:      Comments: Both ear canals with significant inflammation restricting entry of otoscope; right .       Nose: Nose normal. No nasal deformity, mucosal edema or rhinorrhea.      Mouth/Throat:      Pharynx: Uvula midline.   Eyes:      General: Lids are normal. No scleral icterus.     Conjunctiva/sclera: Conjunctivae normal.      Pupils: Pupils are equal, round, and reactive to light.   Neck:      " Musculoskeletal: Normal range of motion and neck supple.   Cardiovascular:      Rate and Rhythm: Normal rate and regular rhythm.      Heart sounds: Normal heart sounds, S1 normal and S2 normal. No murmur. No friction rub. No gallop.    Pulmonary:      Effort: Pulmonary effort is normal. No respiratory distress.      Breath sounds: Normal breath sounds. No wheezing, rhonchi or rales.   Lymphadenopathy:      Head:      Right side of head: No submental, submandibular or tonsillar adenopathy.      Left side of head: No submental, submandibular or tonsillar adenopathy.   Skin:     General: Skin is warm and dry.      Coloration: Skin is not pale.   Neurological:      Mental Status: She is alert and oriented to person, place, and time.   Psychiatric:         Speech: Speech normal.         Behavior: Behavior normal. Behavior is cooperative.         Thought Content: Thought content normal.         Judgment: Judgment normal.       Assessment & Plan    Discussion of plan of care including treatment options regarding health and wellness were reviewed and discussed with patient.  Any changes to medication or treatment plan, as well as any screening blood test, imaging, or referrals to specialist, are documented.  Follow up as indicated.     1. Other infective acute otitis externa of both ears  Patient reports scratching her inner right ear the day prior to pain.  Will start topical antibiotics with steroid to alleviate and resolve inflammation and apparent infection; considered safe during pregnancy.  Recommended she advise her Gyn of this issue and prescription; will send note to Gyn to inform.  - ciprofloxacin-dexamethasone 0.3-0.1% (CIPRODEX) 0.3-0.1 % DrpS; Place 4 drops into both ears 2 (two) times daily.  Dispense: 7.5 mL; Refill: 0       Follow up if symptoms worsen or fail to improve.      ACTIVE MEDICAL ISSUES:  Documented in Problem List    PAST MEDICAL HISTORY  Documented  .  PAST SURGICAL  HISTORY:  Documented    SOCIAL HISTORY:  Documented    FAMILY HISTORY:  Documented    ALLERGIES AND MEDICATIONS: updated and reviewed.  Documented    Health Maintenance       Date Due Completion Date    Influenza Vaccine (1) 08/01/2020 10/17/2019    Cervical Cancer Screening 07/30/2022 7/30/2019    TETANUS VACCINE 07/30/2029 7/30/2019

## 2020-09-30 ENCOUNTER — PATIENT MESSAGE (OUTPATIENT)
Dept: OBSTETRICS AND GYNECOLOGY | Facility: CLINIC | Age: 30
End: 2020-09-30

## 2020-10-16 ENCOUNTER — ROUTINE PRENATAL (OUTPATIENT)
Dept: OBSTETRICS AND GYNECOLOGY | Facility: CLINIC | Age: 30
End: 2020-10-16
Payer: COMMERCIAL

## 2020-10-16 VITALS — SYSTOLIC BLOOD PRESSURE: 122 MMHG | DIASTOLIC BLOOD PRESSURE: 70 MMHG | BODY MASS INDEX: 33.9 KG/M2 | WEIGHT: 203.69 LBS

## 2020-10-16 DIAGNOSIS — Z34.03 ENCOUNTER FOR SUPERVISION OF NORMAL FIRST PREGNANCY IN THIRD TRIMESTER: Primary | ICD-10-CM

## 2020-10-16 DIAGNOSIS — Z3A.33 33 WEEKS GESTATION OF PREGNANCY: ICD-10-CM

## 2020-10-16 PROCEDURE — 99999 PR PBB SHADOW E&M-EST. PATIENT-LVL III: CPT | Mod: PBBFAC,,, | Performed by: OBSTETRICS & GYNECOLOGY

## 2020-10-16 PROCEDURE — 99999 PR PBB SHADOW E&M-EST. PATIENT-LVL III: ICD-10-PCS | Mod: PBBFAC,,, | Performed by: OBSTETRICS & GYNECOLOGY

## 2020-10-16 PROCEDURE — 0502F SUBSEQUENT PRENATAL CARE: CPT | Mod: CPTII,S$GLB,, | Performed by: OBSTETRICS & GYNECOLOGY

## 2020-10-16 PROCEDURE — 0502F PR SUBSEQUENT PRENATAL CARE: ICD-10-PCS | Mod: CPTII,S$GLB,, | Performed by: OBSTETRICS & GYNECOLOGY

## 2020-10-16 NOTE — PROGRESS NOTES
Good fetal movement.   Gave me FMLA papers to complete today again!    29 yo  female @33 wks by 10 wk sono (EDC 2020) who presents for f/u OB visit.    1) SIUP (it's a boy - david)  Normal anatomy per MFM  Rh positive  Pap from 2019 wnl  Quad: negative  Consents blood/vag signed 2020  Declines circ  1 hr GTT wnl    2) h/o HSV 2    3) PP  Provided with inf about contraception and tdap at next visit    sheryl amato MD

## 2020-10-29 ENCOUNTER — ROUTINE PRENATAL (OUTPATIENT)
Dept: OBSTETRICS AND GYNECOLOGY | Facility: CLINIC | Age: 30
End: 2020-10-29
Payer: COMMERCIAL

## 2020-10-29 VITALS
BODY MASS INDEX: 35.26 KG/M2 | DIASTOLIC BLOOD PRESSURE: 70 MMHG | SYSTOLIC BLOOD PRESSURE: 122 MMHG | WEIGHT: 211.88 LBS

## 2020-10-29 DIAGNOSIS — Z3A.34 34 WEEKS GESTATION OF PREGNANCY: ICD-10-CM

## 2020-10-29 DIAGNOSIS — Z34.03 ENCOUNTER FOR SUPERVISION OF NORMAL FIRST PREGNANCY IN THIRD TRIMESTER: Primary | ICD-10-CM

## 2020-10-29 PROCEDURE — 99999 PR PBB SHADOW E&M-EST. PATIENT-LVL III: ICD-10-PCS | Mod: PBBFAC,,, | Performed by: OBSTETRICS & GYNECOLOGY

## 2020-10-29 PROCEDURE — 0502F SUBSEQUENT PRENATAL CARE: CPT | Mod: CPTII,S$GLB,, | Performed by: OBSTETRICS & GYNECOLOGY

## 2020-10-29 PROCEDURE — 99999 PR PBB SHADOW E&M-EST. PATIENT-LVL III: CPT | Mod: PBBFAC,,, | Performed by: OBSTETRICS & GYNECOLOGY

## 2020-10-29 PROCEDURE — 0502F PR SUBSEQUENT PRENATAL CARE: ICD-10-PCS | Mod: CPTII,S$GLB,, | Performed by: OBSTETRICS & GYNECOLOGY

## 2020-10-29 NOTE — PROGRESS NOTES
Reports lower back pain  Swelling in legs  Good fetal movement  Unofficial US: vertex    31 yo  female @34.6 wks by 10 wk sono (EDC 2020) who presents for f/u OB visit.    1) SIUP (it's a boy - david)  Normal anatomy per MFM  Rh positive  Pap from 2019 wnl  Quad: negative  Consents blood/vag signed 2020  Declines circ  1 hr GTT wnl    2) h/o HSV 2    3) PP  Need to discuss contraception and tdap at next visit    sheryl amato MD

## 2020-11-12 ENCOUNTER — ROUTINE PRENATAL (OUTPATIENT)
Dept: OBSTETRICS AND GYNECOLOGY | Facility: CLINIC | Age: 30
End: 2020-11-12
Payer: COMMERCIAL

## 2020-11-12 VITALS — SYSTOLIC BLOOD PRESSURE: 120 MMHG | DIASTOLIC BLOOD PRESSURE: 80 MMHG | BODY MASS INDEX: 36.61 KG/M2 | WEIGHT: 220 LBS

## 2020-11-12 DIAGNOSIS — Z34.03 ENCOUNTER FOR SUPERVISION OF NORMAL FIRST PREGNANCY IN THIRD TRIMESTER: Primary | ICD-10-CM

## 2020-11-12 DIAGNOSIS — B00.9 HSV INFECTION: ICD-10-CM

## 2020-11-12 DIAGNOSIS — Z3A.36 36 WEEKS GESTATION OF PREGNANCY: ICD-10-CM

## 2020-11-12 PROCEDURE — 0502F SUBSEQUENT PRENATAL CARE: CPT | Mod: CPTII,S$GLB,, | Performed by: OBSTETRICS & GYNECOLOGY

## 2020-11-12 PROCEDURE — 87147 CULTURE TYPE IMMUNOLOGIC: CPT

## 2020-11-12 PROCEDURE — 0502F PR SUBSEQUENT PRENATAL CARE: ICD-10-PCS | Mod: CPTII,S$GLB,, | Performed by: OBSTETRICS & GYNECOLOGY

## 2020-11-12 PROCEDURE — 87184 SC STD DISK METHOD PER PLATE: CPT

## 2020-11-12 PROCEDURE — 87081 CULTURE SCREEN ONLY: CPT

## 2020-11-12 PROCEDURE — 99999 PR PBB SHADOW E&M-EST. PATIENT-LVL III: CPT | Mod: PBBFAC,,, | Performed by: OBSTETRICS & GYNECOLOGY

## 2020-11-12 PROCEDURE — 99999 PR PBB SHADOW E&M-EST. PATIENT-LVL III: ICD-10-PCS | Mod: PBBFAC,,, | Performed by: OBSTETRICS & GYNECOLOGY

## 2020-11-12 RX ORDER — ACYCLOVIR 800 MG/1
800 TABLET ORAL 2 TIMES DAILY
Qty: 60 TABLET | Refills: 3 | Status: ON HOLD | OUTPATIENT
Start: 2020-11-12 | End: 2020-12-04 | Stop reason: HOSPADM

## 2020-11-12 NOTE — PROGRESS NOTES
swelling in legs; Good fetal movement  Unofficial US: vertex  Cervix closed    29 yo  female @36.6 wks by 10 wk sono (EDC 2020) who presents for f/u OB visit.    1) SIUP (it's a boy - david)  Normal anatomy per MFM  Rh positive  Pap from 2019 wnl  Quad: negative  Consents blood/vag signed 2020  Declines circ  1 hr GTT wnl  3rd trimester labs today    2) h/o HSV 2    3) PP  Contraception: thinking about OCP  S/p flu vaccine at work  tdap pp    s lm IZQUIERDO

## 2020-11-13 ENCOUNTER — LAB VISIT (OUTPATIENT)
Dept: LAB | Facility: HOSPITAL | Age: 30
End: 2020-11-13
Attending: OBSTETRICS & GYNECOLOGY
Payer: COMMERCIAL

## 2020-11-13 DIAGNOSIS — Z3A.36 36 WEEKS GESTATION OF PREGNANCY: ICD-10-CM

## 2020-11-13 DIAGNOSIS — Z34.03 ENCOUNTER FOR SUPERVISION OF NORMAL FIRST PREGNANCY IN THIRD TRIMESTER: ICD-10-CM

## 2020-11-13 LAB
BASOPHILS # BLD AUTO: 0.03 K/UL (ref 0–0.2)
BASOPHILS NFR BLD: 0.4 % (ref 0–1.9)
DIFFERENTIAL METHOD: ABNORMAL
EOSINOPHIL # BLD AUTO: 0.1 K/UL (ref 0–0.5)
EOSINOPHIL NFR BLD: 0.7 % (ref 0–8)
ERYTHROCYTE [DISTWIDTH] IN BLOOD BY AUTOMATED COUNT: 12.5 % (ref 11.5–14.5)
HCT VFR BLD AUTO: 33.1 % (ref 37–48.5)
HGB BLD-MCNC: 11.6 G/DL (ref 12–16)
IMM GRANULOCYTES # BLD AUTO: 0.07 K/UL (ref 0–0.04)
IMM GRANULOCYTES NFR BLD AUTO: 0.9 % (ref 0–0.5)
LYMPHOCYTES # BLD AUTO: 1.2 K/UL (ref 1–4.8)
LYMPHOCYTES NFR BLD: 14.7 % (ref 18–48)
MCH RBC QN AUTO: 29.2 PG (ref 27–31)
MCHC RBC AUTO-ENTMCNC: 35 G/DL (ref 32–36)
MCV RBC AUTO: 83 FL (ref 82–98)
MONOCYTES # BLD AUTO: 0.9 K/UL (ref 0.3–1)
MONOCYTES NFR BLD: 10.4 % (ref 4–15)
NEUTROPHILS # BLD AUTO: 6 K/UL (ref 1.8–7.7)
NEUTROPHILS NFR BLD: 72.9 % (ref 38–73)
NRBC BLD-RTO: 0 /100 WBC
PLATELET # BLD AUTO: 224 K/UL (ref 150–350)
PMV BLD AUTO: 10.2 FL (ref 9.2–12.9)
RBC # BLD AUTO: 3.97 M/UL (ref 4–5.4)
WBC # BLD AUTO: 8.19 K/UL (ref 3.9–12.7)

## 2020-11-13 PROCEDURE — 86592 SYPHILIS TEST NON-TREP QUAL: CPT

## 2020-11-13 PROCEDURE — 86703 HIV-1/HIV-2 1 RESULT ANTBDY: CPT

## 2020-11-13 PROCEDURE — 36415 COLL VENOUS BLD VENIPUNCTURE: CPT

## 2020-11-13 PROCEDURE — 85025 COMPLETE CBC W/AUTO DIFF WBC: CPT

## 2020-11-14 LAB — RPR SER QL: NORMAL

## 2020-11-16 LAB
BACTERIA SPEC AEROBE CULT: ABNORMAL
HIV 1+2 AB+HIV1 P24 AG SERPL QL IA: NEGATIVE

## 2020-11-20 ENCOUNTER — HOSPITAL ENCOUNTER (OUTPATIENT)
Facility: HOSPITAL | Age: 30
LOS: 1 days | Discharge: HOME | End: 2020-11-20
Attending: OBSTETRICS & GYNECOLOGY | Admitting: OBSTETRICS & GYNECOLOGY
Payer: COMMERCIAL

## 2020-11-20 ENCOUNTER — ROUTINE PRENATAL (OUTPATIENT)
Dept: OBSTETRICS AND GYNECOLOGY | Facility: CLINIC | Age: 30
End: 2020-11-20
Payer: COMMERCIAL

## 2020-11-20 VITALS
HEART RATE: 94 BPM | RESPIRATION RATE: 18 BRPM | DIASTOLIC BLOOD PRESSURE: 82 MMHG | OXYGEN SATURATION: 100 % | TEMPERATURE: 98 F | SYSTOLIC BLOOD PRESSURE: 127 MMHG

## 2020-11-20 VITALS
WEIGHT: 222.69 LBS | BODY MASS INDEX: 37.05 KG/M2 | SYSTOLIC BLOOD PRESSURE: 132 MMHG | DIASTOLIC BLOOD PRESSURE: 90 MMHG

## 2020-11-20 DIAGNOSIS — Z34.03 ENCOUNTER FOR SUPERVISION OF NORMAL FIRST PREGNANCY IN THIRD TRIMESTER: Primary | ICD-10-CM

## 2020-11-20 DIAGNOSIS — O16.3 ELEVATED BLOOD PRESSURE AFFECTING PREGNANCY IN THIRD TRIMESTER, ANTEPARTUM: ICD-10-CM

## 2020-11-20 DIAGNOSIS — Z3A.38 38 WEEKS GESTATION OF PREGNANCY: ICD-10-CM

## 2020-11-20 LAB
ABO + RH BLD: NORMAL
ALBUMIN SERPL BCP-MCNC: 2.6 G/DL (ref 3.5–5.2)
ALP SERPL-CCNC: 192 U/L (ref 55–135)
ALT SERPL W/O P-5'-P-CCNC: 15 U/L (ref 10–44)
ANION GAP SERPL CALC-SCNC: 7 MMOL/L (ref 8–16)
AST SERPL-CCNC: 19 U/L (ref 10–40)
BASOPHILS # BLD AUTO: 0.02 K/UL (ref 0–0.2)
BASOPHILS NFR BLD: 0.3 % (ref 0–1.9)
BILIRUB SERPL-MCNC: 0.2 MG/DL (ref 0.1–1)
BLD GP AB SCN CELLS X3 SERPL QL: NORMAL
BUN SERPL-MCNC: 8 MG/DL (ref 6–20)
CALCIUM SERPL-MCNC: 8.9 MG/DL (ref 8.7–10.5)
CHLORIDE SERPL-SCNC: 107 MMOL/L (ref 95–110)
CO2 SERPL-SCNC: 20 MMOL/L (ref 23–29)
CREAT SERPL-MCNC: 0.6 MG/DL (ref 0.5–1.4)
CREAT UR-MCNC: 22.7 MG/DL (ref 15–325)
DIFFERENTIAL METHOD: ABNORMAL
EOSINOPHIL # BLD AUTO: 0.1 K/UL (ref 0–0.5)
EOSINOPHIL NFR BLD: 0.7 % (ref 0–8)
ERYTHROCYTE [DISTWIDTH] IN BLOOD BY AUTOMATED COUNT: 12.7 % (ref 11.5–14.5)
EST. GFR  (AFRICAN AMERICAN): >60 ML/MIN/1.73 M^2
EST. GFR  (NON AFRICAN AMERICAN): >60 ML/MIN/1.73 M^2
GLUCOSE SERPL-MCNC: 61 MG/DL (ref 70–110)
HCT VFR BLD AUTO: 33.9 % (ref 37–48.5)
HGB BLD-MCNC: 12.1 G/DL (ref 12–16)
IMM GRANULOCYTES # BLD AUTO: 0.08 K/UL (ref 0–0.04)
IMM GRANULOCYTES NFR BLD AUTO: 1.1 % (ref 0–0.5)
LYMPHOCYTES # BLD AUTO: 1.5 K/UL (ref 1–4.8)
LYMPHOCYTES NFR BLD: 20.1 % (ref 18–48)
MCH RBC QN AUTO: 29.8 PG (ref 27–31)
MCHC RBC AUTO-ENTMCNC: 35.7 G/DL (ref 32–36)
MCV RBC AUTO: 84 FL (ref 82–98)
MONOCYTES # BLD AUTO: 1 K/UL (ref 0.3–1)
MONOCYTES NFR BLD: 12.9 % (ref 4–15)
NEUTROPHILS # BLD AUTO: 4.9 K/UL (ref 1.8–7.7)
NEUTROPHILS NFR BLD: 64.9 % (ref 38–73)
NRBC BLD-RTO: 0 /100 WBC
PLATELET # BLD AUTO: 218 K/UL (ref 150–350)
PMV BLD AUTO: 10.3 FL (ref 9.2–12.9)
POTASSIUM SERPL-SCNC: 4.4 MMOL/L (ref 3.5–5.1)
PROT SERPL-MCNC: 6.6 G/DL (ref 6–8.4)
PROT UR-MCNC: <7 MG/DL (ref 0–15)
PROT/CREAT UR: NORMAL MG/G{CREAT} (ref 0–0.2)
RBC # BLD AUTO: 4.06 M/UL (ref 4–5.4)
SODIUM SERPL-SCNC: 134 MMOL/L (ref 136–145)
WBC # BLD AUTO: 7.58 K/UL (ref 3.9–12.7)

## 2020-11-20 PROCEDURE — 59025 FETAL NON-STRESS TEST: CPT

## 2020-11-20 PROCEDURE — 86901 BLOOD TYPING SEROLOGIC RH(D): CPT

## 2020-11-20 PROCEDURE — 36415 COLL VENOUS BLD VENIPUNCTURE: CPT

## 2020-11-20 PROCEDURE — 0502F PR SUBSEQUENT PRENATAL CARE: ICD-10-PCS | Mod: CPTII,S$GLB,, | Performed by: OBSTETRICS & GYNECOLOGY

## 2020-11-20 PROCEDURE — 99999 PR PBB SHADOW E&M-EST. PATIENT-LVL II: CPT | Mod: PBBFAC,,, | Performed by: OBSTETRICS & GYNECOLOGY

## 2020-11-20 PROCEDURE — 99211 OFF/OP EST MAY X REQ PHY/QHP: CPT | Mod: 25

## 2020-11-20 PROCEDURE — 80053 COMPREHEN METABOLIC PANEL: CPT

## 2020-11-20 PROCEDURE — 82570 ASSAY OF URINE CREATININE: CPT

## 2020-11-20 PROCEDURE — 99999 PR PBB SHADOW E&M-EST. PATIENT-LVL II: ICD-10-PCS | Mod: PBBFAC,,, | Performed by: OBSTETRICS & GYNECOLOGY

## 2020-11-20 PROCEDURE — 85025 COMPLETE CBC W/AUTO DIFF WBC: CPT

## 2020-11-20 PROCEDURE — 0502F SUBSEQUENT PRENATAL CARE: CPT | Mod: CPTII,S$GLB,, | Performed by: OBSTETRICS & GYNECOLOGY

## 2020-11-20 RX ORDER — ACETAMINOPHEN 500 MG
500 TABLET ORAL EVERY 6 HOURS PRN
Status: DISCONTINUED | OUTPATIENT
Start: 2020-11-20 | End: 2020-11-20 | Stop reason: HOSPADM

## 2020-11-20 RX ORDER — ONDANSETRON 8 MG/1
8 TABLET, ORALLY DISINTEGRATING ORAL EVERY 8 HOURS PRN
Status: DISCONTINUED | OUTPATIENT
Start: 2020-11-20 | End: 2020-11-20 | Stop reason: HOSPADM

## 2020-11-20 RX ORDER — SODIUM CHLORIDE 9 MG/ML
INJECTION, SOLUTION INTRAVENOUS CONTINUOUS
Status: DISCONTINUED | OUTPATIENT
Start: 2020-11-20 | End: 2020-11-20 | Stop reason: HOSPADM

## 2020-11-20 NOTE — DISCHARGE INSTRUCTIONS
Follow Labor Precautions:  Call MD or return to Labor and Delivery if...     Labor (after 36 weeks)  - Painful contractions every 5 minutes for 2 hours that do not go away with 2 bottles of water, 2 tylenol, and rest.       Labor (before 36 weeks)  - More than 4 contractions in 1 hour   -First try 2 bottles of water, rest, and 2 tylenol.... If the contractions do not go away call doctors office or after hours clinic first and/or come to hospital for evaluation.       Water Breaks  - Gush or leaking of fluid from vagina, or if unsure.      Vaginal bleeding  - Bright red bleeding like a period, soaking a pad in 1 hour.     Decreased or No fetal movement  - You should feel 10 movements within two hours.  -If you are not feeling the baby move.... Drink a glass of orange juice or apple juice  - If you DO NOT feel 10 movements within two hours, please  call the MD or come to the hospital.     Unable to keep fluids or food down for more than 24 hours     Blurred vision, spots before your eyes, dizziness, headache that does not get better with Tylenol (Acetaminophen), bad swelling, chest pain, or trouble breathing.     Drink 10-12 glasses of water daily  Take medications as prescribed  Keep all follow-up appointments

## 2020-11-20 NOTE — PROGRESS NOTES
Elevated BP. No other symptoms.  Will send to L&D for blood work and serial BPs.  swelling in legs; Good fetal movement  Unofficial US: vertex  Cervix FH    31 yo  female @38 wks by 10 wk sono (EDC 2020) who presents for f/u OB visit.    1) SIUP (it's a boy - david)  Normal anatomy per MFM  Rh positive  Pap from 2019 wnl  Quad: negative  Consents blood/vag signed 2020  Declines circ  1 hr GTT wnl  gbs pos    2) h/o HSV 2    3) PP  Contraception: thinking about OCP  S/p flu vaccine at work  tdap pp    s lm IZQUIERDO

## 2020-11-20 NOTE — NURSING
Dr Jimenez at bedside, scheduled pt for induction. Blood pressures stable, educated on s/s of pre-e, when to return to hospital. Verbalized understanding.,

## 2020-11-20 NOTE — NURSING
Discharge instructions given to patient, verbalized understanding. VSS, nad, instructed when to return to hospital, verbalize understanding. Significant other with pt.

## 2020-11-20 NOTE — NURSING
Pt arrived to unit accompanied by Dr. Jimenez to rule out pre-e due to high blood pressure in office. Pt placed in antepartum room and instructed on urine clean catch for p/c ratio.

## 2020-11-22 ENCOUNTER — TELEPHONE (OUTPATIENT)
Dept: OBSTETRICS AND GYNECOLOGY | Facility: CLINIC | Age: 30
End: 2020-11-22

## 2020-11-22 DIAGNOSIS — R10.9 ABDOMINAL PAIN AFFECTING PREGNANCY: ICD-10-CM

## 2020-11-22 DIAGNOSIS — Z34.90 ENCOUNTER FOR INDUCTION OF LABOR: Primary | ICD-10-CM

## 2020-11-22 DIAGNOSIS — O26.899 ABDOMINAL PAIN AFFECTING PREGNANCY: ICD-10-CM

## 2020-11-22 RX ORDER — MISOPROSTOL 100 UG/1
800 TABLET ORAL
Status: CANCELLED | OUTPATIENT
Start: 2020-11-22

## 2020-11-22 RX ORDER — SODIUM CHLORIDE 9 MG/ML
INJECTION, SOLUTION INTRAVENOUS
Status: CANCELLED | OUTPATIENT
Start: 2020-11-22

## 2020-11-22 RX ORDER — ONDANSETRON 4 MG/1
8 TABLET, ORALLY DISINTEGRATING ORAL EVERY 8 HOURS PRN
Status: CANCELLED | OUTPATIENT
Start: 2020-11-22

## 2020-11-22 RX ORDER — SIMETHICONE 80 MG
1 TABLET,CHEWABLE ORAL 4 TIMES DAILY PRN
Status: CANCELLED | OUTPATIENT
Start: 2020-11-22

## 2020-11-22 RX ORDER — CALCIUM CARBONATE 200(500)MG
500 TABLET,CHEWABLE ORAL 3 TIMES DAILY PRN
Status: CANCELLED | OUTPATIENT
Start: 2020-11-22

## 2020-11-22 RX ORDER — OXYTOCIN/RINGER'S LACTATE 30/500 ML
334 PLASTIC BAG, INJECTION (ML) INTRAVENOUS ONCE
Status: CANCELLED | OUTPATIENT
Start: 2020-11-22 | End: 2020-11-22

## 2020-11-22 RX ORDER — OXYTOCIN/RINGER'S LACTATE 30/500 ML
95 PLASTIC BAG, INJECTION (ML) INTRAVENOUS ONCE
Status: CANCELLED | OUTPATIENT
Start: 2020-11-22 | End: 2020-11-22

## 2020-11-22 RX ORDER — SODIUM CHLORIDE, SODIUM LACTATE, POTASSIUM CHLORIDE, CALCIUM CHLORIDE 600; 310; 30; 20 MG/100ML; MG/100ML; MG/100ML; MG/100ML
INJECTION, SOLUTION INTRAVENOUS CONTINUOUS
Status: CANCELLED | OUTPATIENT
Start: 2020-11-22

## 2020-11-22 NOTE — TELEPHONE ENCOUNTER
Patient with gestational HTN.  Will have NST twice weekly and visit with OB this week.  Scheduled for IOL on Nov 29at at 10pm.  Given strict pre-E precautions.    sheryl amato MD

## 2020-11-23 ENCOUNTER — HOSPITAL ENCOUNTER (OUTPATIENT)
Dept: OBSTETRICS AND GYNECOLOGY | Facility: HOSPITAL | Age: 30
Discharge: HOME | End: 2020-11-23
Attending: OBSTETRICS & GYNECOLOGY
Payer: COMMERCIAL

## 2020-11-23 VITALS — SYSTOLIC BLOOD PRESSURE: 143 MMHG | DIASTOLIC BLOOD PRESSURE: 91 MMHG

## 2020-11-23 DIAGNOSIS — O16.3 ELEVATED BLOOD PRESSURE AFFECTING PREGNANCY IN THIRD TRIMESTER, ANTEPARTUM: Primary | ICD-10-CM

## 2020-11-23 PROCEDURE — 59025 FETAL NON-STRESS TEST: CPT

## 2020-11-25 ENCOUNTER — ROUTINE PRENATAL (OUTPATIENT)
Dept: OBSTETRICS AND GYNECOLOGY | Facility: CLINIC | Age: 30
End: 2020-11-25
Payer: COMMERCIAL

## 2020-11-25 VITALS
DIASTOLIC BLOOD PRESSURE: 84 MMHG | BODY MASS INDEX: 36.76 KG/M2 | WEIGHT: 220.88 LBS | SYSTOLIC BLOOD PRESSURE: 120 MMHG

## 2020-11-25 DIAGNOSIS — Z3A.38 38 WEEKS GESTATION OF PREGNANCY: Primary | ICD-10-CM

## 2020-11-25 DIAGNOSIS — O16.3 ELEVATED BLOOD PRESSURE AFFECTING PREGNANCY IN THIRD TRIMESTER, ANTEPARTUM: ICD-10-CM

## 2020-11-25 PROCEDURE — 99999 PR PBB SHADOW E&M-EST. PATIENT-LVL II: CPT | Mod: PBBFAC,,, | Performed by: OBSTETRICS & GYNECOLOGY

## 2020-11-25 PROCEDURE — 0502F SUBSEQUENT PRENATAL CARE: CPT | Mod: CPTII,S$GLB,, | Performed by: OBSTETRICS & GYNECOLOGY

## 2020-11-25 PROCEDURE — 99999 PR PBB SHADOW E&M-EST. PATIENT-LVL II: ICD-10-PCS | Mod: PBBFAC,,, | Performed by: OBSTETRICS & GYNECOLOGY

## 2020-11-25 PROCEDURE — 0502F PR SUBSEQUENT PRENATAL CARE: ICD-10-PCS | Mod: CPTII,S$GLB,, | Performed by: OBSTETRICS & GYNECOLOGY

## 2020-11-25 NOTE — PROGRESS NOTES
No complaints today   No nausea or vomiting   Denies vaginal bleeding or cramping     FU with Dr Jimenez in one week     Thomas Adams M.D.   OB/GYN    11/27/2020      Thomas Adams M.D.   OB/GYN

## 2020-11-27 ENCOUNTER — HOSPITAL ENCOUNTER (OUTPATIENT)
Dept: OBSTETRICS AND GYNECOLOGY | Facility: HOSPITAL | Age: 30
Discharge: HOME | End: 2020-11-27
Attending: OBSTETRICS & GYNECOLOGY
Payer: COMMERCIAL

## 2020-11-27 DIAGNOSIS — O16.3 ELEVATED BLOOD PRESSURE AFFECTING PREGNANCY IN THIRD TRIMESTER, ANTEPARTUM: ICD-10-CM

## 2020-11-27 PROCEDURE — 59025 FETAL NON-STRESS TEST: CPT

## 2020-11-29 ENCOUNTER — HOSPITAL ENCOUNTER (INPATIENT)
Facility: HOSPITAL | Age: 30
LOS: 5 days | Discharge: HOME | End: 2020-12-04
Attending: OBSTETRICS & GYNECOLOGY | Admitting: OBSTETRICS & GYNECOLOGY
Payer: COMMERCIAL

## 2020-11-29 DIAGNOSIS — O26.899 ABDOMINAL PAIN AFFECTING PREGNANCY: ICD-10-CM

## 2020-11-29 DIAGNOSIS — Z34.90 ENCOUNTER FOR INDUCTION OF LABOR: ICD-10-CM

## 2020-11-29 DIAGNOSIS — R10.9 ABDOMINAL PAIN AFFECTING PREGNANCY: ICD-10-CM

## 2020-11-29 LAB
ABO + RH BLD: NORMAL
ALBUMIN SERPL BCP-MCNC: 2.5 G/DL (ref 3.5–5.2)
ALP SERPL-CCNC: 193 U/L (ref 55–135)
ALT SERPL W/O P-5'-P-CCNC: 15 U/L (ref 10–44)
ANION GAP SERPL CALC-SCNC: 10 MMOL/L (ref 8–16)
AST SERPL-CCNC: 18 U/L (ref 10–40)
BASOPHILS # BLD AUTO: 0.03 K/UL (ref 0–0.2)
BASOPHILS NFR BLD: 0.3 % (ref 0–1.9)
BILIRUB SERPL-MCNC: 0.1 MG/DL (ref 0.1–1)
BLD GP AB SCN CELLS X3 SERPL QL: NORMAL
BUN SERPL-MCNC: 14 MG/DL (ref 6–20)
CALCIUM SERPL-MCNC: 9 MG/DL (ref 8.7–10.5)
CHLORIDE SERPL-SCNC: 107 MMOL/L (ref 95–110)
CO2 SERPL-SCNC: 20 MMOL/L (ref 23–29)
CREAT SERPL-MCNC: 0.7 MG/DL (ref 0.5–1.4)
DIFFERENTIAL METHOD: ABNORMAL
EOSINOPHIL # BLD AUTO: 0.1 K/UL (ref 0–0.5)
EOSINOPHIL NFR BLD: 1 % (ref 0–8)
ERYTHROCYTE [DISTWIDTH] IN BLOOD BY AUTOMATED COUNT: 13 % (ref 11.5–14.5)
EST. GFR  (AFRICAN AMERICAN): >60 ML/MIN/1.73 M^2
EST. GFR  (NON AFRICAN AMERICAN): >60 ML/MIN/1.73 M^2
GLUCOSE SERPL-MCNC: 102 MG/DL (ref 70–110)
HCT VFR BLD AUTO: 32.2 % (ref 37–48.5)
HGB BLD-MCNC: 11.3 G/DL (ref 12–16)
IMM GRANULOCYTES # BLD AUTO: 0.1 K/UL (ref 0–0.04)
IMM GRANULOCYTES NFR BLD AUTO: 1.1 % (ref 0–0.5)
LYMPHOCYTES # BLD AUTO: 1.6 K/UL (ref 1–4.8)
LYMPHOCYTES NFR BLD: 17.4 % (ref 18–48)
MCH RBC QN AUTO: 29.3 PG (ref 27–31)
MCHC RBC AUTO-ENTMCNC: 35.1 G/DL (ref 32–36)
MCV RBC AUTO: 83 FL (ref 82–98)
MONOCYTES # BLD AUTO: 1 K/UL (ref 0.3–1)
MONOCYTES NFR BLD: 10.7 % (ref 4–15)
NEUTROPHILS # BLD AUTO: 6.2 K/UL (ref 1.8–7.7)
NEUTROPHILS NFR BLD: 69.5 % (ref 38–73)
NRBC BLD-RTO: 0 /100 WBC
PLATELET # BLD AUTO: 194 K/UL (ref 150–350)
PMV BLD AUTO: 10.5 FL (ref 9.2–12.9)
POTASSIUM SERPL-SCNC: 4.2 MMOL/L (ref 3.5–5.1)
PROT SERPL-MCNC: 6.3 G/DL (ref 6–8.4)
RBC # BLD AUTO: 3.86 M/UL (ref 4–5.4)
SARS-COV-2 RDRP RESP QL NAA+PROBE: NEGATIVE
SODIUM SERPL-SCNC: 137 MMOL/L (ref 136–145)
WBC # BLD AUTO: 8.95 K/UL (ref 3.9–12.7)

## 2020-11-29 PROCEDURE — 85025 COMPLETE CBC W/AUTO DIFF WBC: CPT

## 2020-11-29 PROCEDURE — 25000003 PHARM REV CODE 250: Performed by: OBSTETRICS & GYNECOLOGY

## 2020-11-29 PROCEDURE — 72100002 HC LABOR CARE, 1ST 8 HOURS

## 2020-11-29 PROCEDURE — 63600175 PHARM REV CODE 636 W HCPCS: Performed by: OBSTETRICS & GYNECOLOGY

## 2020-11-29 PROCEDURE — 80053 COMPREHEN METABOLIC PANEL: CPT

## 2020-11-29 PROCEDURE — U0002 COVID-19 LAB TEST NON-CDC: HCPCS

## 2020-11-29 PROCEDURE — 86850 RBC ANTIBODY SCREEN: CPT

## 2020-11-29 PROCEDURE — 11000001 HC ACUTE MED/SURG PRIVATE ROOM

## 2020-11-29 RX ORDER — MISOPROSTOL 200 UG/1
800 TABLET ORAL
Status: DISCONTINUED | OUTPATIENT
Start: 2020-11-29 | End: 2020-12-01

## 2020-11-29 RX ORDER — OXYTOCIN/RINGER'S LACTATE 30/500 ML
95 PLASTIC BAG, INJECTION (ML) INTRAVENOUS ONCE
Status: COMPLETED | OUTPATIENT
Start: 2020-11-29 | End: 2020-12-01

## 2020-11-29 RX ORDER — SIMETHICONE 80 MG
1 TABLET,CHEWABLE ORAL 4 TIMES DAILY PRN
Status: DISCONTINUED | OUTPATIENT
Start: 2020-11-29 | End: 2020-12-01

## 2020-11-29 RX ORDER — MISOPROSTOL 100 MCG
50 TABLET ORAL EVERY 4 HOURS
Status: COMPLETED | OUTPATIENT
Start: 2020-11-29 | End: 2020-11-30

## 2020-11-29 RX ORDER — ONDANSETRON 8 MG/1
8 TABLET, ORALLY DISINTEGRATING ORAL EVERY 8 HOURS PRN
Status: DISCONTINUED | OUTPATIENT
Start: 2020-11-29 | End: 2020-12-01

## 2020-11-29 RX ORDER — SODIUM CHLORIDE 9 MG/ML
INJECTION, SOLUTION INTRAVENOUS
Status: DISCONTINUED | OUTPATIENT
Start: 2020-11-29 | End: 2020-12-01

## 2020-11-29 RX ORDER — CALCIUM CARBONATE 200(500)MG
500 TABLET,CHEWABLE ORAL 3 TIMES DAILY PRN
Status: DISCONTINUED | OUTPATIENT
Start: 2020-11-29 | End: 2020-12-01

## 2020-11-29 RX ORDER — SODIUM CHLORIDE, SODIUM LACTATE, POTASSIUM CHLORIDE, CALCIUM CHLORIDE 600; 310; 30; 20 MG/100ML; MG/100ML; MG/100ML; MG/100ML
INJECTION, SOLUTION INTRAVENOUS CONTINUOUS
Status: DISCONTINUED | OUTPATIENT
Start: 2020-11-29 | End: 2020-12-01

## 2020-11-29 RX ORDER — OXYTOCIN/RINGER'S LACTATE 30/500 ML
334 PLASTIC BAG, INJECTION (ML) INTRAVENOUS ONCE
Status: DISCONTINUED | OUTPATIENT
Start: 2020-11-29 | End: 2020-12-01

## 2020-11-29 RX ADMIN — SODIUM CHLORIDE, SODIUM LACTATE, POTASSIUM CHLORIDE, AND CALCIUM CHLORIDE: .6; .31; .03; .02 INJECTION, SOLUTION INTRAVENOUS at 11:11

## 2020-11-29 RX ADMIN — Medication 50 MCG: at 11:11

## 2020-11-30 ENCOUNTER — ANESTHESIA EVENT (OUTPATIENT)
Dept: OBSTETRICS AND GYNECOLOGY | Facility: HOSPITAL | Age: 30
End: 2020-11-30
Payer: COMMERCIAL

## 2020-11-30 ENCOUNTER — ANESTHESIA (OUTPATIENT)
Dept: OBSTETRICS AND GYNECOLOGY | Facility: HOSPITAL | Age: 30
End: 2020-11-30
Payer: COMMERCIAL

## 2020-11-30 LAB
CREAT UR-MCNC: 27 MG/DL (ref 15–325)
PROT UR-MCNC: 7 MG/DL (ref 0–15)
PROT/CREAT UR: 0.26 MG/G{CREAT} (ref 0–0.2)

## 2020-11-30 PROCEDURE — 72100003 HC LABOR CARE, EA. ADDL. 8 HRS

## 2020-11-30 PROCEDURE — 59200 INSERT CERVICAL DILATOR: CPT

## 2020-11-30 PROCEDURE — 62326 NJX INTERLAMINAR LMBR/SAC: CPT | Performed by: STUDENT IN AN ORGANIZED HEALTH CARE EDUCATION/TRAINING PROGRAM

## 2020-11-30 PROCEDURE — 51702 INSERT TEMP BLADDER CATH: CPT

## 2020-11-30 PROCEDURE — 11000001 HC ACUTE MED/SURG PRIVATE ROOM

## 2020-11-30 PROCEDURE — 27800516 HC TRAY, EPIDURAL COMBO: Performed by: ANESTHESIOLOGY

## 2020-11-30 PROCEDURE — 25000003 PHARM REV CODE 250: Performed by: OBSTETRICS & GYNECOLOGY

## 2020-11-30 PROCEDURE — 27200710 HC EPIDURAL INFUSION PUMP SET: Performed by: ANESTHESIOLOGY

## 2020-11-30 PROCEDURE — 84156 ASSAY OF PROTEIN URINE: CPT

## 2020-11-30 PROCEDURE — 63600175 PHARM REV CODE 636 W HCPCS: Performed by: OBSTETRICS & GYNECOLOGY

## 2020-11-30 RX ORDER — FENTANYL/ROPIVACAINE/NS/PF 2MCG/ML-.2
PLASTIC BAG, INJECTION (ML) INJECTION CONTINUOUS
Status: DISCONTINUED | OUTPATIENT
Start: 2020-11-30 | End: 2020-12-01

## 2020-11-30 RX ORDER — FENTANYL/ROPIVACAINE/NS/PF 2MCG/ML-.2
PLASTIC BAG, INJECTION (ML) INJECTION CONTINUOUS
Status: DISCONTINUED | OUTPATIENT
Start: 2020-11-30 | End: 2020-11-30

## 2020-11-30 RX ORDER — OXYTOCIN/RINGER'S LACTATE 30/500 ML
2 PLASTIC BAG, INJECTION (ML) INTRAVENOUS CONTINUOUS
Status: DISCONTINUED | OUTPATIENT
Start: 2020-11-30 | End: 2020-12-01

## 2020-11-30 RX ORDER — BUTORPHANOL TARTRATE 2 MG/ML
1 INJECTION INTRAMUSCULAR; INTRAVENOUS
Status: DISCONTINUED | OUTPATIENT
Start: 2020-11-30 | End: 2020-12-01

## 2020-11-30 RX ORDER — FENTANYL/ROPIVACAINE/NS/PF 2MCG/ML-.2
PLASTIC BAG, INJECTION (ML) INJECTION
Status: DISPENSED
Start: 2020-11-30 | End: 2020-12-01

## 2020-11-30 RX ORDER — FAMOTIDINE 10 MG/ML
20 INJECTION INTRAVENOUS ONCE
Status: DISCONTINUED | OUTPATIENT
Start: 2020-11-30 | End: 2020-12-01

## 2020-11-30 RX ORDER — SODIUM CITRATE AND CITRIC ACID MONOHYDRATE 334; 500 MG/5ML; MG/5ML
30 SOLUTION ORAL ONCE
Status: DISCONTINUED | OUTPATIENT
Start: 2020-11-30 | End: 2020-12-01

## 2020-11-30 RX ADMIN — Medication 50 MCG: at 03:11

## 2020-11-30 RX ADMIN — Medication 50 MCG: at 07:11

## 2020-11-30 RX ADMIN — DEXTROSE 5 MILLION UNITS: 50 INJECTION, SOLUTION INTRAVENOUS at 06:11

## 2020-11-30 RX ADMIN — SODIUM CHLORIDE, SODIUM LACTATE, POTASSIUM CHLORIDE, AND CALCIUM CHLORIDE: .6; .31; .03; .02 INJECTION, SOLUTION INTRAVENOUS at 07:11

## 2020-11-30 RX ADMIN — PROMETHAZINE HYDROCHLORIDE 12.5 MG: 25 INJECTION INTRAMUSCULAR; INTRAVENOUS at 01:11

## 2020-11-30 RX ADMIN — Medication 2 MILLI-UNITS/MIN: at 07:11

## 2020-11-30 RX ADMIN — DEXTROSE 3 MILLION UNITS: 50 INJECTION, SOLUTION INTRAVENOUS at 11:11

## 2020-11-30 RX ADMIN — SODIUM CHLORIDE, SODIUM LACTATE, POTASSIUM CHLORIDE, AND CALCIUM CHLORIDE: .6; .31; .03; .02 INJECTION, SOLUTION INTRAVENOUS at 09:11

## 2020-11-30 RX ADMIN — SODIUM CHLORIDE, SODIUM LACTATE, POTASSIUM CHLORIDE, AND CALCIUM CHLORIDE: .6; .31; .03; .02 INJECTION, SOLUTION INTRAVENOUS at 04:11

## 2020-11-30 RX ADMIN — SODIUM CHLORIDE, SODIUM LACTATE, POTASSIUM CHLORIDE, AND CALCIUM CHLORIDE: .6; .31; .03; .02 INJECTION, SOLUTION INTRAVENOUS at 03:11

## 2020-11-30 RX ADMIN — BUTORPHANOL TARTRATE 1 MG: 2 INJECTION, SOLUTION INTRAMUSCULAR; INTRAVENOUS at 01:11

## 2020-11-30 NOTE — PROGRESS NOTES
Labor Note    S: patient comfortable.    O  Temp:  [98.8 °F (37.1 °C)]   Pulse:  [71-99]   Resp:  [18]   BP: (116-159)/(73-97)   SpO2:  [95 %-100 %]    Gen: A&Ox3, naD  Abd: gravid  SVE: 1.5cm/thick  FHT: 120bpm, mod kj, +accels, no decels  [place]: q2 min    AP: 31 yo  female @ 39.3 wks with gHTN admitted for IOL.  FHT cat 1  GBS neg  IOL: will receive PO cytotec x 3 now  GHTN: mild range BPs now; no antihypertensive meds needed since admission    sheryl amato MD

## 2020-11-30 NOTE — ANESTHESIA PROCEDURE NOTES
CSE    Patient location during procedure: OB  Start time: 11/30/2020 2:10 PM  Timeout: 11/30/2020 2:10 PM  End time: 11/30/2020 2:30 PM    Staffing  Authorizing Provider: Bryan Lopez MD  Performing Provider: Vianca Pro MD    Preanesthetic Checklist  Completed: patient identified, site marked, surgical consent, pre-op evaluation, timeout performed, IV checked, risks and benefits discussed and monitors and equipment checked  CSE  Patient position: sitting  Prep: ChloraPrep  Patient monitoring: heart rate, cardiac monitor, continuous pulse ox and frequent blood pressure checks  Approach: midline  Spinal Needle  Needle type: pencil-tip   Needle gauge: 25 G  Needle length: 5 in  Epidural Needle  Injection technique: THEODORA air  Needle type: Tuohy   Needle gauge: 17 G  Needle length: 3.5 in  Needle insertion depth: 8 cm  Location: L3-4  Needle localization: anatomical landmarks  Catheter  Catheter type: springwound  Catheter size: 19 G  Catheter at skin depth: 12 cm  Test dose: lidocaine 1.5% with Epi 1-to-200,000 and negative  Additional Documentation: incremental injection, negative aspiration for CSF and negative aspiration for heme  Additional Notes  Injected 2mL of 2 mcg fentanyl/0.2% ropivacaine for spinal dose

## 2020-11-30 NOTE — PROGRESS NOTES
Labor Note    S: patient comfortable.    O  Temp:  [98.5 °F (36.9 °C)-98.8 °F (37.1 °C)]   Pulse:  [65-99]   Resp:  [16-18]   BP: (116-159)/()   SpO2:  [90 %-100 %]    Gen: A&Ox3, naD  Abd: gravid  SVE: 1.5cm/thick  FHT: 130bpm, mod kj, +accels, no decels  [place]: irregular    AP: 31 yo  female @ 39.3 wks with gHTN admitted for IOL.  FHT cat 1  GBS neg  IOL:  --s/p cytotec PO x 3  --rodriguez bulb placed   GHTN: mild range BPs now; no antihypertensive meds needed since admission    sheryl amato MD

## 2020-11-30 NOTE — H&P
HPI:   Demario Christine is a 30 y.o. female  at 39.3 wks EGA who presents here for induction of labor. Pregnancy complicated by gestational hypertension.    ROS:  GENERAL: Denies weight gain or weight loss. Feeling well overall.   SKIN: Denies rash or lesions.   HEAD: Denies head injury or headache.   CHEST: Denies chest pain or shortness of breath.   CARDIOVASCULAR: Denies palpitations or left sided chest pain.   ABDOMEN: No abdominal pain, constipation, diarrhea, nausea, vomiting or rectal bleeding.   URINARY: No frequency, dysuria, hematuria, or burning on urination.  REPRODUCTIVE: See HPI.     Past Medical History:   Diagnosis Date    Chronic diarrhea     Migraine headache      Past Surgical History:   Procedure Laterality Date    COLONOSCOPY N/A 5/15/2017    Procedure: COLONOSCOPY;  Surgeon: Errol Hernandez MD;  Location: [address];  Service: Endoscopy;  Laterality: N/A;     Family History   Problem Relation Age of Onset    Hypertension Mother     No Known Problems Father     No Known Problems Sister     No Known Problems Brother     No Known Problems Maternal Aunt     No Known Problems Maternal Uncle     No Known Problems Paternal Aunt     No Known Problems Paternal Uncle     Cataracts Maternal Grandmother     No Known Problems Maternal Grandfather     No Known Problems Paternal Grandmother     No Known Problems Paternal Grandfather     Blindness Neg Hx     Cancer Neg Hx     Diabetes Neg Hx     Glaucoma Neg Hx     Inflammatory bowel disease Neg Hx     Stomach cancer Neg Hx     Colon cancer Neg Hx     Rectal cancer Neg Hx     Esophageal cancer Neg Hx     Crohn's disease Neg Hx     Celiac disease Neg Hx     Amblyopia Neg Hx     Macular degeneration Neg Hx     Retinal detachment Neg Hx     Strabismus Neg Hx     Stroke Neg Hx     Thyroid disease Neg Hx      Allergies: Patient has no known allergies.       PE:   Temp:  [98.8 °F (37.1 °C)]   Pulse:  [77-99]   Resp:   [18]   BP: (128-151)/(74-92)   SpO2:  [98 %]   APPEARANCE: Well nourished, well developed, in no acute distress.  ABDOMEN:  Gravid.   SVE per RN: fingertip/thick  FHT: 120bpm, mod kj, +accels, no decels  [place]: q7 min    Lab Results   Component Value Date    WBC 8.95 11/29/2020    HGB 11.3 (L) 11/29/2020    HCT 32.2 (L) 11/29/2020    MCV 83 11/29/2020     11/29/2020       B POS    Assessment:  IUP @ 39.3 wks, gHTN, induction of labor      Plan:   Admit to L&D  FHT Cat 1  GBS neg  Plan: PO cytotec  Consents reviewed    SHERWIN Jimenez MD

## 2020-11-30 NOTE — PLAN OF CARE
Fht's 120's, moderate variability with accels and no decels.  Contractions irregular.  Rivera bulb (cook catheter) in place.  Cervix appears to be 2 cm/thin.  Pain controlled with epidural.  Rivera catheter in place with clear yellow urine.  BP less than severe range, fluctuating between 120-150/70-90's.  BP's increase with pain.  Afebrile.  Spouse supportive at bedside.

## 2020-12-01 PROCEDURE — 37000009 HC ANESTHESIA EA ADD 15 MINS: Performed by: OBSTETRICS & GYNECOLOGY

## 2020-12-01 PROCEDURE — 59510 PR FULL ROUT OBSTE CARE,CESAREAN DELIV: ICD-10-PCS | Mod: GB,,, | Performed by: OBSTETRICS & GYNECOLOGY

## 2020-12-01 PROCEDURE — 63600175 PHARM REV CODE 636 W HCPCS: Performed by: OBSTETRICS & GYNECOLOGY

## 2020-12-01 PROCEDURE — 63600175 PHARM REV CODE 636 W HCPCS: Performed by: STUDENT IN AN ORGANIZED HEALTH CARE EDUCATION/TRAINING PROGRAM

## 2020-12-01 PROCEDURE — 25000003 PHARM REV CODE 250: Performed by: STUDENT IN AN ORGANIZED HEALTH CARE EDUCATION/TRAINING PROGRAM

## 2020-12-01 PROCEDURE — 72100003 HC LABOR CARE, EA. ADDL. 8 HRS

## 2020-12-01 PROCEDURE — 25000003 PHARM REV CODE 250: Performed by: OBSTETRICS & GYNECOLOGY

## 2020-12-01 PROCEDURE — 59510 CESAREAN DELIVERY: CPT | Mod: GB,,, | Performed by: OBSTETRICS & GYNECOLOGY

## 2020-12-01 PROCEDURE — 11000001 HC ACUTE MED/SURG PRIVATE ROOM

## 2020-12-01 PROCEDURE — 37000008 HC ANESTHESIA 1ST 15 MINUTES: Performed by: OBSTETRICS & GYNECOLOGY

## 2020-12-01 PROCEDURE — 27000181 HC CABLE, IUPC

## 2020-12-01 PROCEDURE — 36000684 HC CESAREAN SECTION, UNSCHEDULED

## 2020-12-01 RX ORDER — PROPOFOL 10 MG/ML
VIAL (ML) INTRAVENOUS
Status: DISCONTINUED | OUTPATIENT
Start: 2020-12-01 | End: 2020-12-01

## 2020-12-01 RX ORDER — MISOPROSTOL 200 UG/1
800 TABLET ORAL ONCE AS NEEDED
Status: DISCONTINUED | OUTPATIENT
Start: 2020-12-01 | End: 2020-12-01

## 2020-12-01 RX ORDER — BISACODYL 10 MG
10 SUPPOSITORY, RECTAL RECTAL ONCE AS NEEDED
Status: DISCONTINUED | OUTPATIENT
Start: 2020-12-01 | End: 2020-12-04 | Stop reason: HOSPADM

## 2020-12-01 RX ORDER — OXYCODONE HYDROCHLORIDE 5 MG/1
10 TABLET ORAL EVERY 4 HOURS PRN
Status: DISPENSED | OUTPATIENT
Start: 2020-12-01 | End: 2020-12-02

## 2020-12-01 RX ORDER — DOCUSATE SODIUM 100 MG/1
200 CAPSULE, LIQUID FILLED ORAL 2 TIMES DAILY
Status: DISCONTINUED | OUTPATIENT
Start: 2020-12-01 | End: 2020-12-04 | Stop reason: HOSPADM

## 2020-12-01 RX ORDER — ACETAMINOPHEN 325 MG/1
650 TABLET ORAL EVERY 6 HOURS PRN
Status: DISCONTINUED | OUTPATIENT
Start: 2020-12-01 | End: 2020-12-04 | Stop reason: HOSPADM

## 2020-12-01 RX ORDER — SODIUM CHLORIDE, SODIUM LACTATE, POTASSIUM CHLORIDE, CALCIUM CHLORIDE 600; 310; 30; 20 MG/100ML; MG/100ML; MG/100ML; MG/100ML
INJECTION, SOLUTION INTRAVENOUS CONTINUOUS
Status: DISCONTINUED | OUTPATIENT
Start: 2020-12-01 | End: 2020-12-01

## 2020-12-01 RX ORDER — METHYLERGONOVINE MALEATE 0.2 MG/ML
200 INJECTION INTRAVENOUS ONCE AS NEEDED
Status: DISCONTINUED | OUTPATIENT
Start: 2020-12-01 | End: 2020-12-01

## 2020-12-01 RX ORDER — MUPIROCIN 20 MG/G
OINTMENT TOPICAL
Status: DISCONTINUED | OUTPATIENT
Start: 2020-12-01 | End: 2020-12-01

## 2020-12-01 RX ORDER — DIPHENHYDRAMINE HCL 25 MG
25 CAPSULE ORAL EVERY 4 HOURS PRN
Status: DISCONTINUED | OUTPATIENT
Start: 2020-12-01 | End: 2020-12-04 | Stop reason: HOSPADM

## 2020-12-01 RX ORDER — SIMETHICONE 80 MG
1 TABLET,CHEWABLE ORAL EVERY 6 HOURS PRN
Status: DISCONTINUED | OUTPATIENT
Start: 2020-12-01 | End: 2020-12-04 | Stop reason: HOSPADM

## 2020-12-01 RX ORDER — OXYCODONE HYDROCHLORIDE 5 MG/1
5 TABLET ORAL EVERY 4 HOURS PRN
Status: ACTIVE | OUTPATIENT
Start: 2020-12-01 | End: 2020-12-02

## 2020-12-01 RX ORDER — HYDROCORTISONE 25 MG/G
CREAM TOPICAL 3 TIMES DAILY PRN
Status: DISCONTINUED | OUTPATIENT
Start: 2020-12-01 | End: 2020-12-04 | Stop reason: HOSPADM

## 2020-12-01 RX ORDER — OXYTOCIN 10 [USP'U]/ML
INJECTION, SOLUTION INTRAMUSCULAR; INTRAVENOUS
Status: DISCONTINUED | OUTPATIENT
Start: 2020-12-01 | End: 2020-12-01

## 2020-12-01 RX ORDER — ACETAMINOPHEN 325 MG/1
650 TABLET ORAL EVERY 6 HOURS
Status: COMPLETED | OUTPATIENT
Start: 2020-12-01 | End: 2020-12-02

## 2020-12-01 RX ORDER — LIDOCAINE HYDROCHLORIDE AND EPINEPHRINE 15; 5 MG/ML; UG/ML
INJECTION, SOLUTION EPIDURAL
Status: DISCONTINUED | OUTPATIENT
Start: 2020-12-01 | End: 2020-12-01

## 2020-12-01 RX ORDER — ONDANSETRON 2 MG/ML
4 INJECTION INTRAMUSCULAR; INTRAVENOUS EVERY 6 HOURS PRN
Status: ACTIVE | OUTPATIENT
Start: 2020-12-01 | End: 2020-12-02

## 2020-12-01 RX ORDER — MORPHINE SULFATE 1 MG/ML
INJECTION, SOLUTION EPIDURAL; INTRATHECAL; INTRAVENOUS
Status: DISCONTINUED | OUTPATIENT
Start: 2020-12-01 | End: 2020-12-01

## 2020-12-01 RX ORDER — PRENATAL WITH FERROUS FUM AND FOLIC ACID 3080; 920; 120; 400; 22; 1.84; 3; 20; 10; 1; 12; 200; 27; 25; 2 [IU]/1; [IU]/1; MG/1; [IU]/1; MG/1; MG/1; MG/1; MG/1; MG/1; MG/1; UG/1; MG/1; MG/1; MG/1; MG/1
1 TABLET ORAL DAILY
Status: DISCONTINUED | OUTPATIENT
Start: 2020-12-02 | End: 2020-12-04 | Stop reason: HOSPADM

## 2020-12-01 RX ORDER — BUPIVACAINE HYDROCHLORIDE 2.5 MG/ML
30 INJECTION, SOLUTION EPIDURAL; INFILTRATION; INTRACAUDAL ONCE
Status: DISCONTINUED | OUTPATIENT
Start: 2020-12-01 | End: 2020-12-01

## 2020-12-01 RX ORDER — SODIUM CHLORIDE, SODIUM LACTATE, POTASSIUM CHLORIDE, CALCIUM CHLORIDE 600; 310; 30; 20 MG/100ML; MG/100ML; MG/100ML; MG/100ML
INJECTION, SOLUTION INTRAVENOUS CONTINUOUS
Status: DISCONTINUED | OUTPATIENT
Start: 2020-12-01 | End: 2020-12-04 | Stop reason: HOSPADM

## 2020-12-01 RX ORDER — MUPIROCIN 20 MG/G
OINTMENT TOPICAL 2 TIMES DAILY
Status: DISCONTINUED | OUTPATIENT
Start: 2020-12-01 | End: 2020-12-04 | Stop reason: HOSPADM

## 2020-12-01 RX ORDER — ONDANSETRON 8 MG/1
8 TABLET, ORALLY DISINTEGRATING ORAL EVERY 8 HOURS PRN
Status: DISCONTINUED | OUTPATIENT
Start: 2020-12-01 | End: 2020-12-04 | Stop reason: HOSPADM

## 2020-12-01 RX ORDER — ADHESIVE BANDAGE
30 BANDAGE TOPICAL 2 TIMES DAILY PRN
Status: DISCONTINUED | OUTPATIENT
Start: 2020-12-02 | End: 2020-12-04 | Stop reason: HOSPADM

## 2020-12-01 RX ORDER — OXYTOCIN/RINGER'S LACTATE 30/500 ML
334 PLASTIC BAG, INJECTION (ML) INTRAVENOUS ONCE
Status: DISCONTINUED | OUTPATIENT
Start: 2020-12-01 | End: 2020-12-01

## 2020-12-01 RX ORDER — ONDANSETRON 2 MG/ML
INJECTION INTRAMUSCULAR; INTRAVENOUS
Status: DISCONTINUED | OUTPATIENT
Start: 2020-12-01 | End: 2020-12-01

## 2020-12-01 RX ORDER — FAMOTIDINE 10 MG/ML
20 INJECTION INTRAVENOUS
Status: DISCONTINUED | OUTPATIENT
Start: 2020-12-01 | End: 2020-12-01

## 2020-12-01 RX ORDER — KETOROLAC TROMETHAMINE 30 MG/ML
INJECTION, SOLUTION INTRAMUSCULAR; INTRAVENOUS
Status: DISCONTINUED | OUTPATIENT
Start: 2020-12-01 | End: 2020-12-01

## 2020-12-01 RX ORDER — FENTANYL/ROPIVACAINE/NS/PF 2MCG/ML-.2
PLASTIC BAG, INJECTION (ML) INJECTION
Status: DISPENSED
Start: 2020-12-01 | End: 2020-12-01

## 2020-12-01 RX ORDER — OXYTOCIN/RINGER'S LACTATE 30/500 ML
95 PLASTIC BAG, INJECTION (ML) INTRAVENOUS ONCE
Status: DISCONTINUED | OUTPATIENT
Start: 2020-12-01 | End: 2020-12-04 | Stop reason: HOSPADM

## 2020-12-01 RX ORDER — KETOROLAC TROMETHAMINE 30 MG/ML
30 INJECTION, SOLUTION INTRAMUSCULAR; INTRAVENOUS EVERY 6 HOURS
Status: COMPLETED | OUTPATIENT
Start: 2020-12-01 | End: 2020-12-02

## 2020-12-01 RX ORDER — MISOPROSTOL 200 UG/1
200 TABLET ORAL ONCE AS NEEDED
Status: DISCONTINUED | OUTPATIENT
Start: 2020-12-01 | End: 2020-12-04 | Stop reason: HOSPADM

## 2020-12-01 RX ORDER — FENTANYL CITRATE 50 UG/ML
INJECTION, SOLUTION INTRAMUSCULAR; INTRAVENOUS
Status: DISCONTINUED | OUTPATIENT
Start: 2020-12-01 | End: 2020-12-01

## 2020-12-01 RX ORDER — OXYTOCIN/RINGER'S LACTATE 30/500 ML
95 PLASTIC BAG, INJECTION (ML) INTRAVENOUS ONCE
Status: DISCONTINUED | OUTPATIENT
Start: 2020-12-01 | End: 2020-12-01

## 2020-12-01 RX ORDER — CEFAZOLIN SODIUM 2 G/50ML
2 SOLUTION INTRAVENOUS ONCE AS NEEDED
Status: DISCONTINUED | OUTPATIENT
Start: 2020-12-01 | End: 2020-12-01

## 2020-12-01 RX ADMIN — MORPHINE SULFATE 2 MG: 1 INJECTION, SOLUTION EPIDURAL; INTRATHECAL; INTRAVENOUS at 02:12

## 2020-12-01 RX ADMIN — LIDOCAINE HYDROCHLORIDE,EPINEPHRINE BITARTRATE 5 ML: 15; .005 INJECTION, SOLUTION EPIDURAL; INFILTRATION; INTRACAUDAL; PERINEURAL at 01:12

## 2020-12-01 RX ADMIN — OXYTOCIN 30 UNITS: 10 INJECTION, SOLUTION INTRAMUSCULAR; INTRAVENOUS at 02:12

## 2020-12-01 RX ADMIN — ACETAMINOPHEN 650 MG: 325 TABLET ORAL at 10:12

## 2020-12-01 RX ADMIN — ONDANSETRON 4 MG: 2 INJECTION, SOLUTION INTRAMUSCULAR; INTRAVENOUS at 02:12

## 2020-12-01 RX ADMIN — AZITHROMYCIN MONOHYDRATE 500 MG: 500 INJECTION, POWDER, LYOPHILIZED, FOR SOLUTION INTRAVENOUS at 11:12

## 2020-12-01 RX ADMIN — DEXTROSE 3 MILLION UNITS: 50 INJECTION, SOLUTION INTRAVENOUS at 03:12

## 2020-12-01 RX ADMIN — MUPIROCIN: 20 OINTMENT TOPICAL at 10:12

## 2020-12-01 RX ADMIN — LIDOCAINE HYDROCHLORIDE,EPINEPHRINE BITARTRATE 5 ML: 15; .005 INJECTION, SOLUTION EPIDURAL; INFILTRATION; INTRACAUDAL; PERINEURAL at 02:12

## 2020-12-01 RX ADMIN — DEXTROSE 3 MILLION UNITS: 50 INJECTION, SOLUTION INTRAVENOUS at 08:12

## 2020-12-01 RX ADMIN — MORPHINE SULFATE 3 MG: 1 INJECTION, SOLUTION EPIDURAL; INTRATHECAL; INTRAVENOUS at 02:12

## 2020-12-01 RX ADMIN — OXYCODONE HYDROCHLORIDE 10 MG: 5 TABLET ORAL at 04:12

## 2020-12-01 RX ADMIN — Medication 95 MILLI-UNITS/MIN: at 04:12

## 2020-12-01 RX ADMIN — SODIUM CHLORIDE, SODIUM LACTATE, POTASSIUM CHLORIDE, AND CALCIUM CHLORIDE: .6; .31; .03; .02 INJECTION, SOLUTION INTRAVENOUS at 03:12

## 2020-12-01 RX ADMIN — PROPOFOL 20 MG: 10 INJECTION, EMULSION INTRAVENOUS at 02:12

## 2020-12-01 RX ADMIN — KETOROLAC TROMETHAMINE 60 MG: 30 INJECTION, SOLUTION INTRAMUSCULAR; INTRAVENOUS at 02:12

## 2020-12-01 RX ADMIN — FENTANYL CITRATE 100 MCG: 50 INJECTION, SOLUTION INTRAMUSCULAR; INTRAVENOUS at 01:12

## 2020-12-01 RX ADMIN — CEFAZOLIN SODIUM 2 G: 2 SOLUTION INTRAVENOUS at 01:12

## 2020-12-01 RX ADMIN — KETOROLAC TROMETHAMINE 30 MG: 30 INJECTION, SOLUTION INTRAMUSCULAR; INTRAVENOUS at 10:12

## 2020-12-01 RX ADMIN — DOCUSATE SODIUM 200 MG: 100 CAPSULE, LIQUID FILLED ORAL at 10:12

## 2020-12-01 RX ADMIN — PROPOFOL 10 MG: 10 INJECTION, EMULSION INTRAVENOUS at 02:12

## 2020-12-01 NOTE — PLAN OF CARE
"Rounded on couplet. Mother reports infant  well after delivery and denies pain with latch. Breastfeeding guide at bed side and reviewed. Questions encouraged and answered. While discussing signs of good latch, mother reports infant "dimpling" during feeding. Offered assistance with latching now to see. Demonstrated football and cross-cradle holds to mother. Demonstrated hand expression and mother able to hand express colostrum easily. Assisted with latching. Infant latched well but sleepy, only a few sucks noted. Dimpling noted with suckling. Mother reports feeling tugging/pulling during previous feeding and denies pain or hearing any clicking. Once infant came off, dimples still noted in  cheeks. Discussed importance of making sure other signs of good latch are present and that infant is swallowing during feeds.     Mother will breastfeed on cue at least 8 or more times in 24 hours. Mother will monitor for adequate supply and monitor wet and dirty diapers. Mother will call for any breastfeeding needs.    "

## 2020-12-01 NOTE — L&D DELIVERY NOTE
Operative Note    Date: 12/1/2020  Preoperative Diagnosis:  1) IUP @ 39.4 wks, IOL  2) Gestational HTN  3) Fetal intolerance to labor  Postoperative Diagnosis: same  Procedure: primary LTCS  Type of Anesthesia: epidural  Surgeon: Rae Jimenez MD  Assistant Surgeon: maya abarca  Specimen/Tissue Removed: intact 3 vessel placenta discarded  Estimated Blood Loss: 715cc  Urine Output: 115cc clear urine at the end of the procedure  IV Fluids: 1100 cc LR  Complications: none  Findings: viable male infant in cephalic presentation with APGARS 8 and 9; Normal appearing tubes and ovaries bilaterally.    TECHNIQUE:  The patient was taken out to the Operating Room where her epidural was found to be adequate.  She was then prepared and draped and in a normal sterile fashion.  A Pfannenstiel skin incision was made approximately 2 cm above the pubic bone.  Incision was started with the scalpel then taken down to underlying layer of adipose tissue to the fascia with the Bovie.   The fascia was incised in the midline then extended superiorly and laterally with the pickups and curved mayos. Superior aspect of the incision was grasped with Kocher clamps, elevated and the rectus muscles were dissected off the fascia using curved Mayos.  Similar technique was achieved in the lower abdominal incision.  The rectus muscle was then  in the midline.     Bladder blade was placed.  The vesicular peritoneum was identified and entered with pickups and entered sharply with Metzenbaums.  Bladder flap was then created digitally.  Bladder blade was replaced. A scalpel was used to make a low transverse incision in the lower uterine segment.     Baby was delivered using standard technique; Cord was doubly clamped and then cut. Cord blood collected.     The placenta were then delivered with gentle manual traction.  The uterus was exteriorized and cleared of all clots and debris.  The uterus was  closed with 0 Vicryl suture in a running  locked fashion.  A second layer of 0 Vicryl suture in imbrication form was used to gain hemostasis.    Posterior cul-de-sac of Sharan was irrigated of all clots and debris.  Uterus was then returned to the abdomen.  The gutters were cleared of all clots and debris.        The peritoneum and rectus muscles were then reapproximated using 0 Vicryl suture.  The fascia was then closed with 0 Vicryl suture in a running fashion.  Bupivacaine was injected for local anesthesia.  Subcutaneous tissue was then closed with 2-0 Vicryl suture in a running fashion.  The skin was then closed with 4-0 Monocryl suture in a running fashion.     The patient tolerated the procedure well.  Sponge, lap and needle counts were correct x3.  The patient received 2 g of Ancef IV and 500 mg of azithromycin IV   prior to starting the surgery.  She was taken to her room in stable condision.       Rae Jimenez MD

## 2020-12-01 NOTE — PROGRESS NOTES
Patient on pitocin at 10.  Having repetitive late decelerations.  Cervix stil 6-7cm. Fetal station still high.  Will stop pitocin again and prepare patient for primary LTCS for delivery given fetal intolerance to labor.    sheryl amato MD

## 2020-12-01 NOTE — LACTATION NOTE
"  [facility]  Lactation Note - Mom    SUMMARY     Maternal Assessment    Breast Size Issue: other (see comments)(mother reports "small" and denies changes during preg)  Breast Shape: Bilateral:, round, wide  Breast Density: Bilateral:, soft  Areola: Bilateral:, elastic  Nipples: Bilateral:, everted, graspable  Left Nipple Symptoms: other (see comments)(denies pain)  Right Nipple Symptoms: other (see comments)(denies pain)      LATCH Score     see  flowsheets    Breasts WDL    Breast WDL: WDL, nipple symptoms  Left Nipple Symptoms: other (see comments)(denies pain)  Right Nipple Symptoms: other (see comments)(denies pain)    Maternal Infant Feeding    Maternal Preparation: breast care, hand hygiene  Maternal Emotional State: assist needed, relaxed  Infant Positioning: clutch/football, cross-cradle  Pain with Feeding: no  Nipple Shape After Feeding, Right: round  Latch Assistance: yes    Lactation Referrals         Lactation Interventions    Breast Care: Breastfeeding: breast milk to nipples  Breastfeeding Assistance: assisted with positioning, support offered, feeding cue recognition promoted, feeding on demand promoted  Breast Care: Breastfeeding: breast milk to nipples  Breastfeeding Assistance: assisted with positioning, support offered, feeding cue recognition promoted, feeding on demand promoted  Breastfeeding Support: diary/feeding log utilized, encouragement provided, infant-mother separation minimized, maternal hydration promoted, maternal nutrition promoted, maternal rest encouraged       Breastfeeding Session    Infant Positioning: clutch/football, cross-cradle    Maternal Information                 "

## 2020-12-01 NOTE — PROGRESS NOTES
12/01/20 0401   [facility] Alicia Note - Strip   Strip Reviewed by Alicia Nurse? Yes   [facility] Alicia Note - Communication   [place] Nurse Communicated with Bedside Nurse Regarding: Fetal Status   [facility] Alicia Note - Notification   Nurse Notified? Yes   Name of Nurse Dotty

## 2020-12-01 NOTE — TRANSFER OF CARE
"Anesthesia Transfer of Care Note    Patient: Demario Christine    Procedure(s) Performed: Procedure(s) (LRB):   SECTION (N/A)    Patient location: Labor and Delivery    Anesthesia Type: epidural    Transport from OR: Transported from OR on room air with adequate spontaneous ventilation    Post pain: adequate analgesia    Post assessment: no apparent anesthetic complications    Post vital signs: stable    Level of consciousness: awake and alert    Nausea/Vomiting: no nausea/vomiting    Complications: none    Transfer of care protocol was followed      Last vitals:   Visit Vitals  /78   Pulse 89   Temp 37.1 °C (98.8 °F) (Oral)   Resp 20   Ht 5' 5" (1.651 m)   Wt 100.2 kg (220 lb 14.4 oz)   LMP 2020   SpO2 100%   Breastfeeding Yes   BMI 36.76 kg/m²     "

## 2020-12-01 NOTE — PROGRESS NOTES
Labor Note    S: patient comfortable with epidural    O  Temp:  [97.7 °F (36.5 °C)-99.2 °F (37.3 °C)]   Pulse:  []   Resp:  [16-18]   BP: (116-170)/()   SpO2:  [90 %-100 %]    Gen: A&Ox3, naD  Abd: gravid  SVE: 6cm/50/high  FHT: 130bpm, mod kj, +accels, no decels (right now)  Startup:  q5 min IUPC placed pit @ 2    AP: 29 yo  female @ 39. wks with gHTN admitted for IOL.    IOL:  --s/p cytotec PO x 3  --rodriguez bulb now out  --will start pitocin now  --GBS positive: continue PCN    GHTN: mild range BPs now; no antihypertensive meds needed since admission    Patient currently with Cat 1 strip.  Pitocin is being restarted again.  If patient starts to have late decels once pitocin is restarted, recommend proceeding to OR for primary  for delivery as patient is still remote from delivery.    sheryl amato MD

## 2020-12-01 NOTE — PROGRESS NOTES
12/01/20 0547   [facility] Alicia Note - Strip   Strip Reviewed by Alicia Nurse? Yes   [facility] Alicia Note - Communication   [place] Nurse Communicated with Bedside Nurse Regarding: Fetal Status  (repetitive late decels noted)   [facility] Alicia Note - Notification   Nurse Notified? Yes   Name of Nurse desk nurse states pt's nurse(Dotty) is in pt's room and will let her know

## 2020-12-02 DIAGNOSIS — G89.18 POSTOPERATIVE PAIN: Primary | ICD-10-CM

## 2020-12-02 LAB
BASOPHILS # BLD AUTO: 0.03 K/UL (ref 0–0.2)
BASOPHILS NFR BLD: 0.3 % (ref 0–1.9)
DIFFERENTIAL METHOD: ABNORMAL
EOSINOPHIL # BLD AUTO: 0.1 K/UL (ref 0–0.5)
EOSINOPHIL NFR BLD: 0.5 % (ref 0–8)
ERYTHROCYTE [DISTWIDTH] IN BLOOD BY AUTOMATED COUNT: 13.4 % (ref 11.5–14.5)
HCT VFR BLD AUTO: 26.6 % (ref 37–48.5)
HGB BLD-MCNC: 9.4 G/DL (ref 12–16)
IMM GRANULOCYTES # BLD AUTO: 0.09 K/UL (ref 0–0.04)
IMM GRANULOCYTES NFR BLD AUTO: 0.8 % (ref 0–0.5)
LYMPHOCYTES # BLD AUTO: 1 K/UL (ref 1–4.8)
LYMPHOCYTES NFR BLD: 8.5 % (ref 18–48)
MCH RBC QN AUTO: 29.5 PG (ref 27–31)
MCHC RBC AUTO-ENTMCNC: 35.3 G/DL (ref 32–36)
MCV RBC AUTO: 83 FL (ref 82–98)
MONOCYTES # BLD AUTO: 0.9 K/UL (ref 0.3–1)
MONOCYTES NFR BLD: 7.3 % (ref 4–15)
NEUTROPHILS # BLD AUTO: 9.9 K/UL (ref 1.8–7.7)
NEUTROPHILS NFR BLD: 82.6 % (ref 38–73)
NRBC BLD-RTO: 0 /100 WBC
PLATELET # BLD AUTO: 163 K/UL (ref 150–350)
PMV BLD AUTO: 10.4 FL (ref 9.2–12.9)
RBC # BLD AUTO: 3.19 M/UL (ref 4–5.4)
WBC # BLD AUTO: 11.98 K/UL (ref 3.9–12.7)

## 2020-12-02 PROCEDURE — 25000003 PHARM REV CODE 250: Performed by: OBSTETRICS & GYNECOLOGY

## 2020-12-02 PROCEDURE — 63600175 PHARM REV CODE 636 W HCPCS: Performed by: OBSTETRICS & GYNECOLOGY

## 2020-12-02 PROCEDURE — 36415 COLL VENOUS BLD VENIPUNCTURE: CPT

## 2020-12-02 PROCEDURE — 25000003 PHARM REV CODE 250: Performed by: STUDENT IN AN ORGANIZED HEALTH CARE EDUCATION/TRAINING PROGRAM

## 2020-12-02 PROCEDURE — 63600175 PHARM REV CODE 636 W HCPCS: Performed by: STUDENT IN AN ORGANIZED HEALTH CARE EDUCATION/TRAINING PROGRAM

## 2020-12-02 PROCEDURE — 85025 COMPLETE CBC W/AUTO DIFF WBC: CPT

## 2020-12-02 PROCEDURE — 11000001 HC ACUTE MED/SURG PRIVATE ROOM

## 2020-12-02 RX ORDER — OXYCODONE AND ACETAMINOPHEN 5; 325 MG/1; MG/1
1 TABLET ORAL EVERY 4 HOURS PRN
Status: DISCONTINUED | OUTPATIENT
Start: 2020-12-02 | End: 2020-12-04 | Stop reason: HOSPADM

## 2020-12-02 RX ORDER — IBUPROFEN 600 MG/1
600 TABLET ORAL EVERY 6 HOURS PRN
Status: DISCONTINUED | OUTPATIENT
Start: 2020-12-02 | End: 2020-12-04 | Stop reason: HOSPADM

## 2020-12-02 RX ORDER — IBUPROFEN 800 MG/1
800 TABLET ORAL EVERY 8 HOURS PRN
Qty: 30 TABLET | Refills: 0 | Status: SHIPPED | OUTPATIENT
Start: 2020-12-02 | End: 2021-12-02

## 2020-12-02 RX ORDER — OXYCODONE AND ACETAMINOPHEN 5; 325 MG/1; MG/1
1 TABLET ORAL EVERY 6 HOURS PRN
Qty: 30 TABLET | Refills: 0 | Status: SHIPPED | OUTPATIENT
Start: 2020-12-02 | End: 2021-06-18 | Stop reason: ALTCHOICE

## 2020-12-02 RX ORDER — FUROSEMIDE 10 MG/ML
20 INJECTION INTRAMUSCULAR; INTRAVENOUS ONCE
Status: COMPLETED | OUTPATIENT
Start: 2020-12-02 | End: 2020-12-02

## 2020-12-02 RX ORDER — OXYCODONE AND ACETAMINOPHEN 10; 325 MG/1; MG/1
1 TABLET ORAL EVERY 4 HOURS PRN
Status: DISCONTINUED | OUTPATIENT
Start: 2020-12-02 | End: 2020-12-04 | Stop reason: HOSPADM

## 2020-12-02 RX ADMIN — OXYCODONE HYDROCHLORIDE 10 MG: 5 TABLET ORAL at 06:12

## 2020-12-02 RX ADMIN — PRENATAL VIT W/ FE FUMARATE-FA TAB 27-0.8 MG 1 TABLET: 27-0.8 TAB at 08:12

## 2020-12-02 RX ADMIN — KETOROLAC TROMETHAMINE 30 MG: 30 INJECTION, SOLUTION INTRAMUSCULAR; INTRAVENOUS at 05:12

## 2020-12-02 RX ADMIN — KETOROLAC TROMETHAMINE 30 MG: 30 INJECTION, SOLUTION INTRAMUSCULAR; INTRAVENOUS at 11:12

## 2020-12-02 RX ADMIN — ACETAMINOPHEN 650 MG: 325 TABLET ORAL at 06:12

## 2020-12-02 RX ADMIN — MUPIROCIN: 20 OINTMENT TOPICAL at 08:12

## 2020-12-02 RX ADMIN — IBUPROFEN 600 MG: 600 TABLET, FILM COATED ORAL at 09:12

## 2020-12-02 RX ADMIN — DOCUSATE SODIUM 200 MG: 100 CAPSULE, LIQUID FILLED ORAL at 08:12

## 2020-12-02 RX ADMIN — ACETAMINOPHEN 650 MG: 325 TABLET ORAL at 11:12

## 2020-12-02 RX ADMIN — ACETAMINOPHEN 650 MG: 325 TABLET ORAL at 05:12

## 2020-12-02 RX ADMIN — KETOROLAC TROMETHAMINE 30 MG: 30 INJECTION, SOLUTION INTRAMUSCULAR; INTRAVENOUS at 06:12

## 2020-12-02 RX ADMIN — OXYCODONE HYDROCHLORIDE AND ACETAMINOPHEN 1 TABLET: 5; 325 TABLET ORAL at 09:12

## 2020-12-02 RX ADMIN — FUROSEMIDE 20 MG: 10 INJECTION, SOLUTION INTRAMUSCULAR; INTRAVENOUS at 09:12

## 2020-12-02 NOTE — PLAN OF CARE
Vss, nad, pt crying c/o intense cramping (heating pad given) pt was medicated w/toradol and percolone @ 0615, tolerating regular diet, passing gas, reports breast feeding is going well - denies any nipple pain, appears to be bonding well w/infant.  Poc: pain management as needed, DTV @ 1230, breast feeding support offered, ambulation and po hydration encouraged.  Instructed pt to call for assistance OOB when ready to void since it will be her first time getting up since delivery.  Pt verbalized understanding.  Reviewed poc w/pt.  Pt verbalized understanding.

## 2020-12-02 NOTE — PROGRESS NOTES
POD #1    S: patient doing well. Min pain. Itching s/p epidural. Tolerating reg diet. Waiting for spontaneous void.    O  Temp:  [97.7 °F (36.5 °C)-99.2 °F (37.3 °C)]   Pulse:  []   Resp:  [16-20]   BP: (116-170)/()   SpO2:  [90 %-100 %]    Gen: a&ox3, nAD  Abd: soft, fundus firm no pain, incision with bandage c/d/e  Ext: 2+ edema    Lab Results   Component Value Date    WBC 8.95 2020    HGB 11.3 (L) 2020    HCT 32.2 (L) 2020    MCV 83 2020     2020       B POS    AP: 29 yo  female s/p primary LTCS for fetal intolerance to labor, gestational HTN.  Continue postop care  Waiting for spontaneous void  Reg diet. PO pain meds.  One dose of lasix for LE edema  Declines circ  Gestational HTN: normotensive since delivery    sheryl amato MD

## 2020-12-02 NOTE — LACTATION NOTE
"Pt needed assistance with positioning and holding the breast to shape it correctly to achieve deep latch. After demonstration and explanation, pt able to do so as directed. Also provided information to view video from [employer] "Attaching your baby at your breast". Encouraged hand expression for additional stimulation. Small amount of colostrum easily expressed. Pt stated she is very excited. Praise and encouragement provided. Significant other supportive at bedside.     [facility]  Lactation Note - Mom    SUMMARY     Maternal Assessment    Breast Size Issue: yes, bilateral(small, wide spaced, reports minimal changes during pregnancy)  Breast Shape: Bilateral:, wide  Breast Density: Bilateral:, soft  Areola: Bilateral:, elastic  Nipples: Bilateral:, everted, graspable  Left Nipple Symptoms: other (see comments)(denies pain )  Right Nipple Symptoms: other (see comments)(denies pain )  Preferred Pain Scale: number (Numeric Rating Pain Scale)  Comfort/Acceptable Pain Level: 5  Pain Body Location - Orientation: lower  Pain Body Location: abdomen  Pain Rating (0-10): Rest: 0  Pain Rating (0-10): Activity: 1  Pain Rating: Rest: 0 - no pain  Frequency: intermittent  Quality: aching, cramping  Nonverbal Indicators of Pain: crying, grimace  Pain Management Interventions: pain management plan reviewed with patient/caregiver  Sleep/Rest/Relaxation: awake  POSS (Pasero Opioid-Induced Sed Scale): 1 - Awake and alert  Fever Reduction/Comfort Measures: medication administered    LATCH Score         Breasts WDL    Breast WDL: WDL  Left Nipple Symptoms: other (see comments)(denies pain )  Right Nipple Symptoms: other (see comments)(denies pain )    Maternal Infant Feeding    Maternal Preparation: breast care, hand hygiene  Maternal Emotional State: assist needed, relaxed  Infant Positioning: cradle, clutch/football, cross-cradle  Signs of Milk Transfer: audible swallow, infant jaw motion present, " suck/swallow ratio  Pain with Feeding: no  Comfort Measures Before/During Feeding: infant position adjusted, latch adjusted, maternal position adjusted, suction broken using finger  Milk Ejection Reflex: absent  Comfort Measures Following Feeding: air-drying encouraged, expressed milk applied, other (see comments)(lanolin given)  Nipple Shape After Feeding, Left: round, elongated  Nipple Shape After Feeding, Right: round, elongated  Latch Assistance: yes    Lactation Referrals    Lactation Referrals: pediatric care provider, outpatient lactation program  Outpatient Lactation Program Lactation Follow-up Date/Time: call lact ctr PRN  Pediatric Care Provider Lactation Follow-up Date/Time: f/u for frequent wt checks    Lactation Interventions    Breast Care: Breastfeeding: manual expression to soften breast, milk massaged towards nipple, lanolin to nipples  Breastfeeding Assistance: assisted with positioning, feeding cue recognition promoted, feeding on demand promoted, feeding session observed, infant latch-on verified, infant stimulated to wakeful state, infant suck/swallow verified, support offered  Breast Care: Breastfeeding: manual expression to soften breast, milk massaged towards nipple, lanolin to nipples  Breastfeeding Assistance: assisted with positioning, feeding cue recognition promoted, feeding on demand promoted, feeding session observed, infant latch-on verified, infant stimulated to wakeful state, infant suck/swallow verified, support offered  Fetal Wellbeing Promotion: intake and output monitored  Breastfeeding Support: encouragement provided, maternal rest encouraged, lactation counseling provided, diary/feeding log utilized       Breastfeeding Session    Breast Pumping Interventions: (discussed rationales for pumping)  Infant Positioning: cradle, clutch/football, cross-cradle  Signs of Milk Transfer: audible swallow, infant jaw motion present, suck/swallow ratio    Maternal Information    Infant  Reason for Referral:  infant

## 2020-12-02 NOTE — PLAN OF CARE
Patient with Rivera catheter throughout the night; will d/c @0600 as per MD order. Tolerating regular diet. IVF's infusing as ordered.  LTV incision with aquacel dressing c/d/i.  Patient reports pain controlled with ordered medications. Plan of care reviewed with patient; questions answered/encouraged.  Bonding with baby AEB holding, breastfeeding, dressing, and changing baby's diaper. Smiles appropriately at baby. Spouse at bedside attentive and supportive of patient's and baby's needs. Call light in reach, side rails up x2, nonskid socks on, bed in lowest position with wheels locked.  Remains free from falls and/or injury. VSS. NAD noted. Will continue to monitor.

## 2020-12-02 NOTE — PLAN OF CARE
Mother will exclusively breastfeed on cue 8 or more times in 24 hours. Will hand express for additional stimulation. Will record voids/stools. Will monitor baby for signs of adequate feedings. Will call for assistance if needed. Significant other supportive at bedside.

## 2020-12-02 NOTE — ANESTHESIA POSTPROCEDURE EVALUATION
Anesthesia Post Evaluation    Patient: Demario Christine    Procedure(s) Performed: Procedure(s) (LRB):   SECTION (N/A)    Final Anesthesia Type: CSE    Patient location during evaluation: labor & delivery  Patient participation: Yes- Able to Participate  Level of consciousness: awake and alert and oriented  Post-procedure vital signs: reviewed and stable  Pain management: adequate  Airway patency: patent  CASANDRA mitigation strategies: Multimodal analgesia  PONV status at discharge: No PONV  Anesthetic complications: no      Cardiovascular status: blood pressure returned to baseline and hemodynamically stable  Hydration status: euvolemic  Follow-up not needed.      No catheter in back.  No headache/neckache/backache.  Full return of neurological function.  Able to urinate, ambulate.  Advised patient to report any new problems of back pain, especially with fever or decreasing bladder function occurring during coming days to weeks.    Vitals Value Taken Time   /83 20 0800   Temp 36.4 °C (97.6 °F) 20 0800   Pulse 87 20 0800   Resp 18 20 0800   SpO2 99 % 20 08         No case tracking events are documented in the log.      Pain/Tanner Score: Pain Rating Prior to Med Admin: 3 (2020 11:22 AM)  Pain Rating Post Med Admin: 0 (2020 11:00 PM)

## 2020-12-03 PROCEDURE — 11000001 HC ACUTE MED/SURG PRIVATE ROOM

## 2020-12-03 PROCEDURE — 25000003 PHARM REV CODE 250: Performed by: OBSTETRICS & GYNECOLOGY

## 2020-12-03 RX ADMIN — IBUPROFEN 600 MG: 600 TABLET, FILM COATED ORAL at 08:12

## 2020-12-03 RX ADMIN — OXYCODONE HYDROCHLORIDE AND ACETAMINOPHEN 1 TABLET: 10; 325 TABLET ORAL at 08:12

## 2020-12-03 RX ADMIN — PRENATAL VIT W/ FE FUMARATE-FA TAB 27-0.8 MG 1 TABLET: 27-0.8 TAB at 10:12

## 2020-12-03 RX ADMIN — OXYCODONE HYDROCHLORIDE AND ACETAMINOPHEN 1 TABLET: 5; 325 TABLET ORAL at 02:12

## 2020-12-03 RX ADMIN — IBUPROFEN 600 MG: 600 TABLET, FILM COATED ORAL at 04:12

## 2020-12-03 RX ADMIN — IBUPROFEN 600 MG: 600 TABLET, FILM COATED ORAL at 01:12

## 2020-12-03 RX ADMIN — MUPIROCIN: 20 OINTMENT TOPICAL at 08:12

## 2020-12-03 RX ADMIN — OXYCODONE HYDROCHLORIDE AND ACETAMINOPHEN 1 TABLET: 5; 325 TABLET ORAL at 01:12

## 2020-12-03 RX ADMIN — DOCUSATE SODIUM 200 MG: 100 CAPSULE, LIQUID FILLED ORAL at 10:12

## 2020-12-03 RX ADMIN — MUPIROCIN: 20 OINTMENT TOPICAL at 10:12

## 2020-12-03 RX ADMIN — DOCUSATE SODIUM 200 MG: 100 CAPSULE, LIQUID FILLED ORAL at 08:12

## 2020-12-03 NOTE — PROGRESS NOTES
POD #2    S: patient doing well. Min pain. Tolerating reg diet. Voiding spontaneouslu.    O  Temp:  [97.6 °F (36.4 °C)-99.2 °F (37.3 °C)]   Pulse:  []   Resp:  [16-20]   BP: (117-170)/()   SpO2:  [90 %-100 %]    Gen: a&ox3, nAD  Abd: soft, fundus firm no pain, incision with bandage c/d/e  Ext: 2+ edema    Lab Results   Component Value Date    WBC 11.98 2020    HGB 9.4 (L) 2020    HCT 26.6 (L) 2020    MCV 83 2020     2020       B POS    AP: 31 yo  female s/p primary LTCS for fetal intolerance to labor, gestational HTN.    Continue postop care  declines circ  Gestational HTN: normotensive since delivery   with bilirubin issues    Patient likely for d/c to home in the am  sheryl amato MD

## 2020-12-03 NOTE — PLAN OF CARE
Rounded on couplet. Mother is to breastfeed for 15 mins then supplement with formula per NNP order. Discussed importance of beginning to pump for added stimulation to breasts and to offer EBM to infant as supplement before formula. Mother exhausted but eager to pump. [employer] pump and collection kit brought to bed side and instructions given for use/cleaning. Discussed proper use of initiation phase on pump and hands-on pumping technique. Mother able to hand express colostrum easily. Praise given. Assistance provided for current feeding as support person is currently not in room.    PLAN: breastfeed x15 mins, supplement w/ EBM/formula as ordered, and pump. Mother agrees with plan and comfortable.

## 2020-12-03 NOTE — PLAN OF CARE
Plan of care reviewed with pt this am.  VSS. Pt voiding and passing flatus without difficulty.  Ambulating well.  Tolerating regular diet.  Reports pain relieved with ordered pain meds administered.   Questions encouraged and answered.  Bonding appropriately with infant.  Breastfeeding support given.  Encouraged to feed infant 8 or more times in 24 hour period and on demand feedings.  Verbalized understanding.

## 2020-12-03 NOTE — PHYSICIAN QUERY
"  PT Name: Demario Christine  MR #: [id number]     OB HSV CLARIFICATION     CDS/: MARCIAL Noe,RNC-MNN        Contact information:[email or web address]  This form is a permanent document in the medical record.    Query Date: December 3, 2020  By submitting this query, we are merely seeking further clarification of documentation. Please utilize your independent clinical judgment when addressing the question(s) below.        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  due to fetal intolerance to labor.      Please document in your progress notes daily for the duration of treatment, until resolved, and include in your discharge summary.          "

## 2020-12-03 NOTE — LACTATION NOTE
[facility]  Lactation Note - Mom    SUMMARY     Maternal Assessment    Breast Size Issue: yes, bilateral(small, wide spaced, reports minimal changes during pregnancy)  Breast Shape: Bilateral:, wide  Breast Density: Bilateral:, soft  Areola: Bilateral:, elastic  Nipples: Bilateral:, everted, graspable  Left Nipple Symptoms: other (see comments)(denies pain)  Right Nipple Symptoms: other (see comments)(denies pain)      LATCH Score     see  flowsheets    Breasts WDL    Breast WDL: WDL, nipple symptoms  Left Nipple Symptoms: other (see comments)(denies pain)  Right Nipple Symptoms: other (see comments)(denies pain)    Maternal Infant Feeding    Maternal Preparation: breast care, hand hygiene  Maternal Emotional State: other (see comments), assist needed(exhausted)  Infant Positioning: cradle, cross-cradle  Signs of Milk Transfer: audible swallow  Pain with Feeding: no  Comfort Measures Before/During Feeding: infant position adjusted, latch adjusted, maternal position adjusted, suction broken using finger  Milk Ejection Reflex: absent  Comfort Measures Following Feeding: air-drying encouraged, expressed milk applied  Nipple Shape After Feeding, Left: round  Nipple Shape After Feeding, Right: round, elongated  Latch Assistance: yes    Lactation Referrals    Lactation Referrals: pediatric care provider, outpatient lactation program  Outpatient Lactation Program Lactation Follow-up Date/Time: call lact ctr PRN  Pediatric Care Provider Lactation Follow-up Date/Time: f/u for frequent wt checks    Lactation Interventions    Breast Care: Breastfeeding: breast milk to nipples, open to air  Breastfeeding Assistance: assisted with positioning, feeding cue recognition promoted, feeding on demand promoted, infant suck/swallow verified, support offered  Breast Care: Breastfeeding: breast milk to nipples, open to air  Breastfeeding Assistance: assisted with positioning, feeding cue recognition promoted,  feeding on demand promoted, infant suck/swallow verified, support offered  Fetal Wellbeing Promotion: intake and output monitored  Breastfeeding Support: diary/feeding log utilized, encouragement provided, infant-mother separation minimized       Breastfeeding Session    Breast Pumping Interventions: early pumping promoted, frequent pumping encouraged, post-feed pumping encouraged  Infant Positioning: cradle, cross-cradle  Signs of Milk Transfer: audible swallow    Maternal Information    Infant Reason for Referral:  infant

## 2020-12-04 VITALS
WEIGHT: 220.88 LBS | HEIGHT: 65 IN | HEART RATE: 75 BPM | BODY MASS INDEX: 36.8 KG/M2 | RESPIRATION RATE: 18 BRPM | SYSTOLIC BLOOD PRESSURE: 131 MMHG | OXYGEN SATURATION: 100 % | TEMPERATURE: 98 F | DIASTOLIC BLOOD PRESSURE: 90 MMHG

## 2020-12-04 PROCEDURE — 25000003 PHARM REV CODE 250: Performed by: OBSTETRICS & GYNECOLOGY

## 2020-12-04 RX ADMIN — IBUPROFEN 600 MG: 600 TABLET, FILM COATED ORAL at 04:12

## 2020-12-04 RX ADMIN — DOCUSATE SODIUM 200 MG: 100 CAPSULE, LIQUID FILLED ORAL at 08:12

## 2020-12-04 RX ADMIN — OXYCODONE HYDROCHLORIDE AND ACETAMINOPHEN 1 TABLET: 5; 325 TABLET ORAL at 01:12

## 2020-12-04 RX ADMIN — OXYCODONE HYDROCHLORIDE AND ACETAMINOPHEN 1 TABLET: 5; 325 TABLET ORAL at 08:12

## 2020-12-04 RX ADMIN — MUPIROCIN: 20 OINTMENT TOPICAL at 08:12

## 2020-12-04 NOTE — LACTATION NOTE
[facility]  Lactation Note - Mom    SUMMARY     Maternal Assessment    Breast Size Issue: none  Breast Shape: Bilateral:, wide  Breast Density: Bilateral:, filling  Areola: Bilateral:, elastic  Nipples: Bilateral:, everted, graspable  Left Nipple Symptoms: other (see comments)(denies)  Right Nipple Symptoms: other (see comments)(denies)  Preferred Pain Scale: number (Numeric Rating Pain Scale)  Comfort/Acceptable Pain Level: 3  Pain Body Location - Side: Bilateral  Pain Body Location - Orientation: lower  Pain Body Location: abdomen  Pain Rating (0-10): Rest: 0  Pain Rating (0-10): Activity: 0  Pain Rating: Rest: 0 - no pain  Frequency: intermittent  Quality: cramping  Nonverbal Indicators of Pain: crying, grimace  Pain Management Interventions: medication offered but refused, pain management plan reviewed with patient/caregiver  Sleep/Rest/Relaxation: no problem identified, awake  POSS (Pasero Opioid-Induced Sed Scale): 1 - Awake and alert  Fever Reduction/Comfort Measures: medication administered    LATCH Score         Breasts WDL    Breast WDL: WDL  Left Nipple Symptoms: other (see comments)(denies)  Right Nipple Symptoms: other (see comments)(denies)    Maternal Infant Feeding    Maternal Preparation: breast care, hand hygiene  Maternal Emotional State: independent, relaxed  Infant Positioning: cradle, cross-cradle  Signs of Milk Transfer: audible swallow  Pain with Feeding: no  Comfort Measures Before/During Feeding: infant position adjusted, latch adjusted, maternal position adjusted, suction broken using finger  Milk Ejection Reflex: absent  Comfort Measures Following Feeding: air-drying encouraged  Nipple Shape After Feeding, Left: round  Nipple Shape After Feeding, Right: round, elongated  Latch Assistance: no    Lactation Referrals    Lactation Referrals: pediatric care provider, support group, outpatient lactation program, other (see comments)  Outpatient Lactation Program Lactation  Follow-up Date/Time: call lact ctr PRN  Pediatric Care Provider Lactation Follow-up Date/Time: f/u for frequent wt checks    Lactation Interventions    Breast Care: Breastfeeding: open to air  Breastfeeding Assistance: feeding cue recognition promoted, feeding on demand promoted, supplemental feeding provided, support offered  Breastfeeding Support: encouragement provided, feeding on demand promoted, infant-mother separation minimized, support offered  Breast Care: Breastfeeding: open to air  Breastfeeding Assistance: feeding cue recognition promoted, feeding on demand promoted, supplemental feeding provided, support offered  Fetal Wellbeing Promotion: intake and output monitored  Breastfeeding Support: diary/feeding log utilized, encouragement provided, infant-mother separation minimized       Breastfeeding Session    Breast Pumping Interventions: frequent pumping encouraged  Infant Positioning: cradle, cross-cradle  Signs of Milk Transfer: audible swallow    Maternal Information    Person Making Referral: nurse  Maternal Reason for Referral: breastfeeding currently  Infant Reason for Referral:  infant

## 2020-12-04 NOTE — LACTATION NOTE
This note was copied from a baby's chart.  Mother will breastfeed on cue at least 8 or more times in 24 hours. Mother will monitor for adequate supply and monitor wet and dirty diapers. Mother will call for any breastfeeding needs.  Mother continues to supplement after breastfeeding per NNP order. Discussed the importance of putting baby to breast 1st 8 or more times for increase supply. Discussed following up with ped and decreasing formula suppl as milk comes in. Discharge  instructions given, discussed 2 st alert form and when to seek med attention. Mother verbalized understanding. Mother has breastpump at home

## 2020-12-04 NOTE — PROGRESS NOTES
POD #3    S: patient doing well.     O  Temp:  [97.5 °F (36.4 °C)-98.9 °F (37.2 °C)]   Pulse:  []   Resp:  [16-20]   BP: (118-153)/(64-89)   SpO2:  [98 %-100 %]    Gen: a&ox3, nAD  Abd: soft, fundus firm no pain, incision with bandage c/d/e  Ext: 2+ edema    Lab Results   Component Value Date    WBC 11.98 2020    HGB 9.4 (L) 2020    HCT 26.6 (L) 2020    MCV 83 2020     2020       B POS    AP: 29 yo  female s/p primary LTCS for fetal intolerance to labor, gestational HTN.    Continue postop care  declines circ  Gestational HTN: normotensive since delivery    D/c to home today    sheryl amato MD

## 2020-12-04 NOTE — PLAN OF CARE
Discussed with pt the importance of pumping her breast (as well as putting the baby to her breast))  if her desire is to breastfeed.  Pt stated that she has not used the breast pump since Yani showed her how to use this am.  Encourage pt to feed the baby on her breast and when she needs to suppliment with formula (per md orders) to have her support person feed the baby while she pumps her breast.  Pt verbalized understanding.    Baby finished breastfeeding, attempted to help mom with pumping her breast but she refused stating she will do it later.  Educated mom on importance of frequent pumping to help her milk supply.  Mom verbalized understanding.

## 2020-12-04 NOTE — NURSING
@ 39w3d, IOL due to gestational HTN.  fht's with moderate variability and accels.  irregular contractions.  SVE @ 0330 0.5/50/-4. bp's 120-150's/70-90's & MD aware.  Due for next dose of cytotec @ 0740.    Bedside handoff done.  No complaints.  Reports slightly feeling contractions.  Family at bedside.   
0305 Dr. Jimenez updated on pt late decel at 0044. Ctx q2-3. SVE 6/70/-3, oral temp 99.2. Orders to titrate pitocin to 8mu/min and monitor FHT. Will update Dr. Jimenez of fetal intolerance.   
1200pm=  Written discharge instructions given and explained to pt.  Also reviewed Mother-baby care guide with pt.   Questions encouraged and answered.  Pt verbalized understanding.  Pt reports received home Rx meds from [facility].  Explanation for use of meds given and explained to pt, and informed pt to follow pharmacy's instructions for how to take meds.  Pt verbalized understanding.   
1948- 5 late decels noted. SVE 5/60/-4. Pitocin stopped. Fluid bolus started, pt positioned to left lateral. Dr. Jimenez Paged.     2028- Dr. Jimenez called unit, updated on pt status. Continue with POC.   
2030- Dr. Jimenez at nurses [facility]. Ordered 50 mcg cytotec PO q4 hrs 3 doses as needed.     2300- SVE .5/50/-4. FHTs 140s with accels, no decels. Positive fetal movements.   
Dr Jimenez did SVE, 1.5/thick.  Order rec'd to give 3rd dose of cytotec as planned and pt may have a regular diet for breakfast.  Pt aware of plans.  Fht's 120's, moderate variability, accels.  Contractions irregular, mild-moderate.  Denies headaches, blurry vision, or RUQ abdominal pain.  Edema present to BLE.  IV with LR infusing well at 125 mL/hr.  Discussed poc for labor, pain evaluation and pain management, can ambulate to the bathroom, COVID visitor policy, and after breakfast must return to a clear liquid diet.  Verbal agreement with poc. Call light within reach.   
Dr. Jimenez called unit. Informed of SVE, no change. Decels noted. Will be on unit to place IUPC.   
Dr. Jimenez called unit. Updated on SVE .5/50/-4. Orders to start Penicillin when in active labor.   
Dr. Jimenez called unit. Updated on mild elevated pressures. SVE .5/50/-4.  Orders to continue with POC.   
PCN for GBS not started at this time.  Orders rec'd to start when in active labor from Dr Jimenez this am.  
Pt ambulated to restroom with stable and steady gait.  Pt voided 900 plus.  Hat overflowed into the toilet.  Instructed pt to call for assistance OOB if needed.  Pt verbalized understanding.  
Restart pitocin at 2mu/min per MD.   
07-Jan-2018

## 2020-12-04 NOTE — DISCHARGE SUMMARY
[facility]  Obstetrics  Discharge Summary      Patient Name: Demario Christine  MRN: [id number]  Admission Date: 2020  Hospital Length of Stay: 5 days  Discharge Date and Time:  2020 7:54 AM  Attending Physician: Rae Jimenez MD   Discharging Provider: Rae Jimenez MD  Primary Care Provider: Gianna Porter MD    HPI: 31 yo  female admitted at 39+ weeks for induction of labor for gestational hypertension.    Procedure(s) (LRB):   SECTION (N/A)     Hospital Course: The patient's labor course was complicated by fetal intolerance to labor. She is now s/p uncomplicated primary low transverse  for delivery. Her postpartum course was uncomplicated. On day of discharge, she was tolerating PO, voiding spontaneously, ambulating. She was deemed stable for discharge to home.    Final Active Diagnoses:    Diagnosis Date Noted POA    PRINCIPAL PROBLEM:   delivery delivered [O82] 2020 No    Abdominal pain affecting pregnancy [O26.899, R10.9] 2020 Yes    Elevated blood pressure affecting pregnancy in third trimester, antepartum [O16.3] 2020 Yes      Problems Resolved During this Admission:        Lab Results   Component Value Date    WBC 11.98 2020    HGB 9.4 (L) 2020    HCT 26.6 (L) 2020    MCV 83 2020     2020       B POS    Feeding Method: breast    Immunizations     None          Delivery:    Episiotomy: None   Lacerations: None   Repair suture: None   Repair # of packets:     Blood loss (ml):       Birth information:  YOB: 2020   Time of birth: 2:22 PM   Sex: male   Delivery type: , Low Transverse   Gestational Age: 39w4d    Delivery Clinician:      Other providers:       Additional  information:  Forceps:    Vacuum:    Breech:    Observed anomalies      Living?:           APGARS  One minute Five minutes Ten minutes   Skin color:         Heart rate:         Grimace:          Muscle tone:         Breathing:         Totals: 9  9        Placenta: Delivered:       appearance    Pending Diagnostic Studies:     None          Discharged Condition: good    Disposition: Home or Self Care    Follow Up:  Follow-up Information     Rae Jimenez MD On 12/8/2020.    Specialties: Obstetrics, Obstetrics and Gynecology  Why: 115pm for incision check   Contact information:  [address]  [address]  [place] [address]  [phone]                 Patient Instructions:   No discharge procedures on file.  Medications:  Current Discharge Medication List      CONTINUE these medications which have NOT CHANGED    Details   prenatal vit/iron fum/folic ac (PRENATAL 1+1 ORAL) Take by mouth.      ibuprofen (ADVIL,MOTRIN) 800 MG tablet Take 1 tablet (800 mg total) by mouth every 8 (eight) hours as needed.  Qty: 30 tablet, Refills: 0    Associated Diagnoses: Postoperative pain      oxyCODONE-acetaminophen (PERCOCET) 5-325 mg per tablet Take 1 tablet by mouth every 6 (six) hours as needed.  Qty: 30 tablet, Refills: 0    Comments: Quantity prescribed more than 7 day supply? No  Associated Diagnoses: Postoperative pain         STOP taking these medications       acyclovir (ZOVIRAX) 800 MG Tab Comments:   Reason for Stopping:         acyclovir (ZOVIRAX) 800 MG Tab Comments:   Reason for Stopping:         ciprofloxacin-dexamethasone 0.3-0.1% (CIPRODEX) 0.3-0.1 % DrpS Comments:   Reason for Stopping:         ergocalciferol (ERGOCALCIFEROL) 50,000 unit Cap Comments:   Reason for Stopping:         ondansetron (ZOFRAN-ODT) 4 MG TbDL Comments:   Reason for Stopping:         ondansetron (ZOFRAN-ODT) disintegrating tablet Comments:   Reason for Stopping:         pantoprazole (PROTONIX) 40 MG tablet Comments:   Reason for Stopping:         sertraline (ZOLOFT) 100 MG tablet Comments:   Reason for Stopping:               Rae Jimenez MD  Obstetrics  [facility]

## 2020-12-04 NOTE — DISCHARGE INSTRUCTIONS
"Patient Discharge Instructions for Postpartum Women    Resume Regular Diet  Increase activity gradually, no heavy lifting  Shower only.  Do NOT sit in a tub of water, to bathe, until ok with doctor.  No tampons, douching or sexual intercourse for 6 weeks, or until ok with doctor.   Discuss birth control options with your physician.  Wear a support bra  Return to work/school when you've been cleared by a physician    Call your physician if     *Fever of 100.4 or higher  *Persistent nausea/ vomiting  *Incisional drainage (for C-Sections)  *Heavy vaginal bleeding or large clots (Heavy bleeding is soaking 1 pad in an hour)  *Swelling and pain in arms or legs  *Severe headaches, blurred vision or fainting  *Shortness of breath  *Frequency and burning with urination  *Signs of postpartum depression, discuss these signs with your physician    *Call your doctor with any problems or concerns, or go to the nearest Emergency Room.     Call lactation services for questions regarding feeding, nipple and breast care, and general questions about lactation.  They can be reached at [phone]         Understanding Postpartum Depression    You've just had a baby.  You know you should be excited and happy.  But instead you find yourself crying for no reason.  You may have trouble coping with your daily tasks.  You feel sad, tired, and hopeless most of the time.  You may even feel ashamed or guilty.  But what you're going through is not your fault and you can feel better.  Talk to your doctor.  He or she can help.    Depression After Childbirth    You may be weepy and tired right after giving birth.  These feelings are normal.  They're sometimes called the "baby blues."  These blues go away 2-3 weeks.  However, postpartum (meaning "after birth") depression lasts much longer and is more sever than the "baby blues."  It can make you feel sad and hopeless.  You may also fear that your baby will be harmed and worry about being a bad " mother.      What is Depression?    Depression is a mood disorder that affects the way you think and feel.  The most common symptom is a feeling of deep sadness.  You may also feel as if you just can't cope with life.    Other symptoms include:      * Gaining or loosing weight  * Sleeping too much or too little  * Feeling tired all the time  * Feeling restless  * Fears of harming your baby   * Lack of interest in your baby  * Feeling worthless or guilty  * No longer finding pleasure in things you used to  * Having trouble thinking clearly or making decisions  * Thoughts of hurting yourself or your baby    What Causes Postpartum Depression    The exact causes of postpartum depression isn't known.  It may be due to changes in your hormones during and after childbirth.  You may also be tired from caring for your baby and adjusting to being a mother.  All these factors may make you feel depressed.  In some cases, your genes may also play a role.    Depression Can Be Treated    The good news is that there are many ways to treat postpartum depression.  Talking to your doctor is the first step toward feeling better.    Resources:    * National Mayer of Mental Health  -- 054-803-8244    www.nimh.nih.gov    * National Mobile on Mental Illness --748.553.8932    Www.junior.org    * Mental Health [place] -- 583.965.6448     Www.nmha.org    * National Suicide Hotline --916.994.1992 (800-SUICIDE)    1696-0412 [employer]  All rights reserved.  This information is not intended as a substitute for professional medical care.  Always follow up with your healthcare professional's instructions.          Breastfeeding Discharge Instructions       Feed the baby at the earliest sign of hunger or comfort  o Hands to mouth, sucking motions  o Rooting or searching for something to suck on  o Dont wait for crying - it is a sign of distress     The feedings may be 8-12 times per 24hrs and will not follow a  schedule   Avoid pacifiers and bottles for the first 4 weeks   Alternate the breast you start the feeding with, or start with the breast that feels the fullest   Switch breasts when the baby takes himself off the breast or falls asleep   Keep offering breasts until the baby looks full, no longer gives hunger signs, and stays asleep when placed on his back in the crib   If the baby is sleepy and wont wake for a feeding, put the baby skin-to-skin dressed in a diaper against the mothers bare chest   Sleep near your baby   The baby should be positioned and latched on to the breast correctly  o Chest-to-chest, chin in the breast  o Babys lips are flipped outward  o Babys mouth is stretched open wide like a shout  o Babys sucking should feel like tugging to the mother  - The baby should be drinking at the breast:  o You should hear swallowing or gulping throughout the feeding  o You should see milk on the babys lips when he comes off the breast  o Your breasts should be softer when the baby is finished feeding  o The baby should look relaxed at the end of feedings  o After the 4th day and your milk is in:  o The babys poop should turn bright yellow and be loose, watery, and seedy  o The baby should have at least 3-4 poops the size of the palm of your hand per day  o The baby should have at least 5-6 wet diapers per day  o The urine should be light yellow in color  You should drink when you are thirsty and eat a healthy diet when you are    hungry.     Take naps to get the rest you need.   Take medications and/or drink alcohol only with permission of your obstetrician    or the babys pediatrician.  You can also call the Infant Risk Center,   ([phone]), Monday-Friday, 8am-5pm Central time, to get the most   up-to-date evidence-based information on the use of medications during   pregnancy and breastfeeding.      The baby should be examined by a pediatrician at 3-5 days of age.  Once your   milk comes  in, the baby should be gaining at least ½ - 1oz each day and should be back to birthweight no later than 10-14 days of age.          Community Resources    [facility] Breastfeeding Warmline: [phone]  Local [facility] clinics: provide incentives and breastpumps to eligible mothers  La Leche League International (RADHALI):  mother-to-mother support group website        www.Cloud Sherpas.CinemaNow  Local La Leche League mother-to-mother support groups:        www.Prescient        La Leche League [facility]   Dr. Fransisco Loja website for latch videos and general information:        www.breastfeedinginc.ca  Infant Risk Center is a call center that provides information about the safety of taking medications while breastfeeding.  Call 1-324.193.9140, M-F, 8am-5pm, [place].  International Lactation Consultant Association provides resources for assistance:        www.ilca.org  [facility] Breastfeeding Coalition provides informationand resources for parents  and the community    http://Beebe Healthcareastfeeding.org     Patti Brown is a mom-to-mom support group:                             www.[employer].com//breastfeedng-support/  Partners for Healthy Babies:  3-324-897-BABY(6952)  Cafe [employer] Lait: a breastfeeding support group for women of color, 552.248.9562

## 2020-12-08 ENCOUNTER — POSTPARTUM VISIT (OUTPATIENT)
Dept: OBSTETRICS AND GYNECOLOGY | Facility: CLINIC | Age: 30
End: 2020-12-08
Payer: COMMERCIAL

## 2020-12-08 VITALS
DIASTOLIC BLOOD PRESSURE: 84 MMHG | SYSTOLIC BLOOD PRESSURE: 130 MMHG | BODY MASS INDEX: 34.46 KG/M2 | HEIGHT: 65 IN | WEIGHT: 206.81 LBS

## 2020-12-08 PROCEDURE — 99024 POSTOP FOLLOW-UP VISIT: CPT | Mod: S$GLB,,, | Performed by: OBSTETRICS & GYNECOLOGY

## 2020-12-08 PROCEDURE — 99999 PR PBB SHADOW E&M-EST. PATIENT-LVL II: ICD-10-PCS | Mod: PBBFAC,,, | Performed by: OBSTETRICS & GYNECOLOGY

## 2020-12-08 PROCEDURE — 99024 PR POST-OP FOLLOW-UP VISIT: ICD-10-PCS | Mod: S$GLB,,, | Performed by: OBSTETRICS & GYNECOLOGY

## 2020-12-08 PROCEDURE — 99999 PR PBB SHADOW E&M-EST. PATIENT-LVL II: CPT | Mod: PBBFAC,,, | Performed by: OBSTETRICS & GYNECOLOGY

## 2020-12-08 NOTE — PROGRESS NOTES
"The patient presents for her postpartum visit/incision check. She has no complaints today. Denies fever, chills. She denies pain with urination.  Denies diarrhea. Denies constipation. She denies problems with her incision.  Pain well controlled with PO pain medications.only taking motrin, not percocet    Type of Delivery:  She had primary LTCS  Delivery Date: 12/1/2020  Delivery MD: halle jimenez MD  Gender: male  Baby Name: david  breast Feeding: yes  Vaginal Bleeding: yes  Incontinence: no  Depression: a little bit  Contraception discussed and patient unsure  Gestational HTN: BPs look ok today  Support system:  yes    ROS: negative    PE:   Vitals: /84   Ht 5' 5" (1.651 m)   Wt 93.8 kg (206 lb 12.7 oz)   LMP 02/26/2020   BMI 34.41 kg/m²   APPEARANCE: Well nourished, well developed, in no acute distress.  ABDOMEN: Soft. No tenderness or masses. No hepatosplenomegaly. No hernias. Incision c/d/i; no erythema or purulent discharge, aquacel bandage removed  PELVIC: deferred    A/P: Postpartum visit/Incision check  -patient doing well, incision healing well  -contraception: unsure    Educated on signs of infection. Contact office if any redness, swelling, fever/chills, excessive pain    F/u in 1 wk incision check    SHERWIN Jimenez MD      "

## 2020-12-09 ENCOUNTER — TELEPHONE (OUTPATIENT)
Dept: OBSTETRICS AND GYNECOLOGY | Facility: CLINIC | Age: 30
End: 2020-12-09

## 2020-12-09 NOTE — TELEPHONE ENCOUNTER
Follow up call made to pt. Mother states breastfeeding is going well. She stated she occasionally supplements with formula. Mother states she pumps about three times a day. Mother denies questions or needs. She stated baby had ped visit two days ago 12/7/20 and ped stated everything looks good. No needs , questions at this time. Praise and encouragement offered.

## 2020-12-21 ENCOUNTER — POSTPARTUM VISIT (OUTPATIENT)
Dept: OBSTETRICS AND GYNECOLOGY | Facility: CLINIC | Age: 30
End: 2020-12-21
Payer: COMMERCIAL

## 2020-12-21 VITALS
BODY MASS INDEX: 33.71 KG/M2 | WEIGHT: 202.63 LBS | SYSTOLIC BLOOD PRESSURE: 124 MMHG | DIASTOLIC BLOOD PRESSURE: 80 MMHG

## 2020-12-21 DIAGNOSIS — R30.0 DYSURIA: ICD-10-CM

## 2020-12-21 PROCEDURE — 99999 PR PBB SHADOW E&M-EST. PATIENT-LVL II: ICD-10-PCS | Mod: PBBFAC,,, | Performed by: OBSTETRICS & GYNECOLOGY

## 2020-12-21 PROCEDURE — 87086 URINE CULTURE/COLONY COUNT: CPT

## 2020-12-21 PROCEDURE — 99024 POSTOP FOLLOW-UP VISIT: CPT | Mod: S$GLB,,, | Performed by: OBSTETRICS & GYNECOLOGY

## 2020-12-21 PROCEDURE — 99024 PR POST-OP FOLLOW-UP VISIT: ICD-10-PCS | Mod: S$GLB,,, | Performed by: OBSTETRICS & GYNECOLOGY

## 2020-12-21 PROCEDURE — 99999 PR PBB SHADOW E&M-EST. PATIENT-LVL II: CPT | Mod: PBBFAC,,, | Performed by: OBSTETRICS & GYNECOLOGY

## 2020-12-21 NOTE — PROGRESS NOTES
The patient presents for her postpartum visit/incision check. Reports that urine is very yellow - despite drinking lots of water.  Denies fever, chills.  Denies diarrhea. Denies constipation. She denies problems with her incision.  Pain well controlled with PO pain medications.only taking motrin, not percocet    Type of Delivery:  She had primary LTCS, complicated by gHTN  Delivery Date: 12/1/2020  Delivery MD: halle amato MD  Gender: male  Baby Name: david  breast Feeding: yes  Vaginal Bleeding: yes (comes and goes)  Incontinence: no  Depression: a little bit (first time mom)  Contraception declines  Gestational HTN: BPs look good today  Support system:  yes    ROS: negative    PE:   Vitals: /80   Wt 91.9 kg (202 lb 9.6 oz)   LMP 02/26/2020   Breastfeeding Yes   BMI 33.71 kg/m²   APPEARANCE: Well nourished, well developed, in no acute distress.  ABDOMEN: Soft. No tenderness or masses. No hepatosplenomegaly. No hernias. Incision c/d/i; no erythema or purulent discharge, aquacel bandage removed  PELVIC: deferred    A/P: Postpartum visit/Incision check  -patient doing well, incision healing well  -contraception: declines  -urine cx sent    sheryl amato MD

## 2020-12-24 LAB — BACTERIA UR CULT: NO GROWTH

## 2021-03-18 ENCOUNTER — OFFICE VISIT (OUTPATIENT)
Dept: FAMILY MEDICINE | Facility: CLINIC | Age: 31
End: 2021-03-18
Payer: MEDICAID

## 2021-03-18 VITALS
RESPIRATION RATE: 14 BRPM | TEMPERATURE: 99 F | SYSTOLIC BLOOD PRESSURE: 122 MMHG | BODY MASS INDEX: 33.46 KG/M2 | DIASTOLIC BLOOD PRESSURE: 82 MMHG | WEIGHT: 200.81 LBS | OXYGEN SATURATION: 99 % | HEIGHT: 65 IN | HEART RATE: 82 BPM

## 2021-03-18 DIAGNOSIS — K05.10 GUM INFLAMMATION: ICD-10-CM

## 2021-03-18 DIAGNOSIS — N91.2 AMENORRHEA: Primary | ICD-10-CM

## 2021-03-18 LAB
B-HCG UR QL: POSITIVE
CTP QC/QA: YES

## 2021-03-18 PROCEDURE — 99213 OFFICE O/P EST LOW 20 MIN: CPT | Mod: PBBFAC,PO | Performed by: INTERNAL MEDICINE

## 2021-03-18 PROCEDURE — 99999 PR PBB SHADOW E&M-EST. PATIENT-LVL III: CPT | Mod: PBBFAC,,, | Performed by: INTERNAL MEDICINE

## 2021-03-18 PROCEDURE — 99999 PR PBB SHADOW E&M-EST. PATIENT-LVL III: ICD-10-PCS | Mod: PBBFAC,,, | Performed by: INTERNAL MEDICINE

## 2021-03-18 PROCEDURE — 81025 URINE PREGNANCY TEST: CPT | Mod: PBBFAC,PO | Performed by: INTERNAL MEDICINE

## 2021-03-18 PROCEDURE — 99214 PR OFFICE/OUTPT VISIT, EST, LEVL IV, 30-39 MIN: ICD-10-PCS | Mod: S$PBB,,, | Performed by: INTERNAL MEDICINE

## 2021-03-18 PROCEDURE — 99214 OFFICE O/P EST MOD 30 MIN: CPT | Mod: S$PBB,,, | Performed by: INTERNAL MEDICINE

## 2021-04-13 ENCOUNTER — OFFICE VISIT (OUTPATIENT)
Dept: OBSTETRICS AND GYNECOLOGY | Facility: CLINIC | Age: 31
End: 2021-04-13
Payer: MEDICAID

## 2021-04-13 ENCOUNTER — LAB VISIT (OUTPATIENT)
Dept: LAB | Facility: HOSPITAL | Age: 31
End: 2021-04-13
Attending: OBSTETRICS & GYNECOLOGY
Payer: MEDICAID

## 2021-04-13 VITALS
SYSTOLIC BLOOD PRESSURE: 110 MMHG | HEIGHT: 65 IN | WEIGHT: 199.75 LBS | BODY MASS INDEX: 33.28 KG/M2 | DIASTOLIC BLOOD PRESSURE: 66 MMHG

## 2021-04-13 DIAGNOSIS — R11.0 NAUSEA: ICD-10-CM

## 2021-04-13 DIAGNOSIS — N91.2 AMENORRHEA: Primary | ICD-10-CM

## 2021-04-13 DIAGNOSIS — N91.2 AMENORRHEA: ICD-10-CM

## 2021-04-13 LAB
ABO + RH BLD: NORMAL
ALBUMIN SERPL BCP-MCNC: 3.7 G/DL (ref 3.5–5.2)
ALP SERPL-CCNC: 72 U/L (ref 55–135)
ALT SERPL W/O P-5'-P-CCNC: 9 U/L (ref 10–44)
ANION GAP SERPL CALC-SCNC: 8 MMOL/L (ref 8–16)
AST SERPL-CCNC: 15 U/L (ref 10–40)
BASOPHILS # BLD AUTO: 0.02 K/UL (ref 0–0.2)
BASOPHILS NFR BLD: 0.3 % (ref 0–1.9)
BILIRUB SERPL-MCNC: 0.6 MG/DL (ref 0.1–1)
BLD GP AB SCN CELLS X3 SERPL QL: NORMAL
BUN SERPL-MCNC: 6 MG/DL (ref 6–20)
CALCIUM SERPL-MCNC: 9.5 MG/DL (ref 8.7–10.5)
CHLORIDE SERPL-SCNC: 106 MMOL/L (ref 95–110)
CO2 SERPL-SCNC: 22 MMOL/L (ref 23–29)
CREAT SERPL-MCNC: 0.7 MG/DL (ref 0.5–1.4)
DIFFERENTIAL METHOD: ABNORMAL
EOSINOPHIL # BLD AUTO: 0 K/UL (ref 0–0.5)
EOSINOPHIL NFR BLD: 0.7 % (ref 0–8)
ERYTHROCYTE [DISTWIDTH] IN BLOOD BY AUTOMATED COUNT: 14.6 % (ref 11.5–14.5)
EST. GFR  (AFRICAN AMERICAN): >60 ML/MIN/1.73 M^2
EST. GFR  (NON AFRICAN AMERICAN): >60 ML/MIN/1.73 M^2
GLUCOSE SERPL-MCNC: 78 MG/DL (ref 70–110)
HCT VFR BLD AUTO: 34.5 % (ref 37–48.5)
HGB BLD-MCNC: 12.1 G/DL (ref 12–16)
IMM GRANULOCYTES # BLD AUTO: 0.02 K/UL (ref 0–0.04)
IMM GRANULOCYTES NFR BLD AUTO: 0.3 % (ref 0–0.5)
LYMPHOCYTES # BLD AUTO: 1.4 K/UL (ref 1–4.8)
LYMPHOCYTES NFR BLD: 24.1 % (ref 18–48)
MCH RBC QN AUTO: 26.5 PG (ref 27–31)
MCHC RBC AUTO-ENTMCNC: 35.1 G/DL (ref 32–36)
MCV RBC AUTO: 76 FL (ref 82–98)
MONOCYTES # BLD AUTO: 0.6 K/UL (ref 0.3–1)
MONOCYTES NFR BLD: 10.5 % (ref 4–15)
NEUTROPHILS # BLD AUTO: 3.8 K/UL (ref 1.8–7.7)
NEUTROPHILS NFR BLD: 64.1 % (ref 38–73)
NRBC BLD-RTO: 0 /100 WBC
PLATELET # BLD AUTO: 270 K/UL (ref 150–450)
PMV BLD AUTO: 9.6 FL (ref 9.2–12.9)
POTASSIUM SERPL-SCNC: 4.1 MMOL/L (ref 3.5–5.1)
PROT SERPL-MCNC: 7.3 G/DL (ref 6–8.4)
RBC # BLD AUTO: 4.56 M/UL (ref 4–5.4)
SODIUM SERPL-SCNC: 136 MMOL/L (ref 136–145)
WBC # BLD AUTO: 5.93 K/UL (ref 3.9–12.7)

## 2021-04-13 PROCEDURE — 99213 OFFICE O/P EST LOW 20 MIN: CPT | Mod: PBBFAC,PO,25 | Performed by: OBSTETRICS & GYNECOLOGY

## 2021-04-13 PROCEDURE — 82570 ASSAY OF URINE CREATININE: CPT | Performed by: OBSTETRICS & GYNECOLOGY

## 2021-04-13 PROCEDURE — 99999 PR PBB SHADOW E&M-EST. PATIENT-LVL III: CPT | Mod: PBBFAC,,, | Performed by: OBSTETRICS & GYNECOLOGY

## 2021-04-13 PROCEDURE — 86592 SYPHILIS TEST NON-TREP QUAL: CPT | Performed by: OBSTETRICS & GYNECOLOGY

## 2021-04-13 PROCEDURE — 99204 OFFICE O/P NEW MOD 45 MIN: CPT | Mod: TH,S$PBB,, | Performed by: OBSTETRICS & GYNECOLOGY

## 2021-04-13 PROCEDURE — 99999 PR PBB SHADOW E&M-EST. PATIENT-LVL III: ICD-10-PCS | Mod: PBBFAC,,, | Performed by: OBSTETRICS & GYNECOLOGY

## 2021-04-13 PROCEDURE — 99204 PR OFFICE/OUTPT VISIT, NEW, LEVL IV, 45-59 MIN: ICD-10-PCS | Mod: TH,S$PBB,, | Performed by: OBSTETRICS & GYNECOLOGY

## 2021-04-13 PROCEDURE — 87340 HEPATITIS B SURFACE AG IA: CPT | Performed by: OBSTETRICS & GYNECOLOGY

## 2021-04-13 PROCEDURE — 36415 COLL VENOUS BLD VENIPUNCTURE: CPT | Performed by: OBSTETRICS & GYNECOLOGY

## 2021-04-13 PROCEDURE — 85025 COMPLETE CBC W/AUTO DIFF WBC: CPT | Performed by: OBSTETRICS & GYNECOLOGY

## 2021-04-13 PROCEDURE — 86787 VARICELLA-ZOSTER ANTIBODY: CPT | Performed by: OBSTETRICS & GYNECOLOGY

## 2021-04-13 PROCEDURE — 86703 HIV-1/HIV-2 1 RESULT ANTBDY: CPT | Performed by: OBSTETRICS & GYNECOLOGY

## 2021-04-13 PROCEDURE — 87086 URINE CULTURE/COLONY COUNT: CPT | Performed by: OBSTETRICS & GYNECOLOGY

## 2021-04-13 PROCEDURE — 86900 BLOOD TYPING SEROLOGIC ABO: CPT | Performed by: OBSTETRICS & GYNECOLOGY

## 2021-04-13 PROCEDURE — 86762 RUBELLA ANTIBODY: CPT | Performed by: OBSTETRICS & GYNECOLOGY

## 2021-04-13 PROCEDURE — 80053 COMPREHEN METABOLIC PANEL: CPT | Performed by: OBSTETRICS & GYNECOLOGY

## 2021-04-13 PROCEDURE — 88175 CYTOPATH C/V AUTO FLUID REDO: CPT | Performed by: OBSTETRICS & GYNECOLOGY

## 2021-04-13 RX ORDER — ONDANSETRON 4 MG/1
4 TABLET, ORALLY DISINTEGRATING ORAL EVERY 8 HOURS PRN
Qty: 30 TABLET | Refills: 2 | Status: SHIPPED | OUTPATIENT
Start: 2021-04-13 | End: 2022-11-15

## 2021-04-14 LAB
CREAT UR-MCNC: 212 MG/DL (ref 15–325)
HBV SURFACE AG SERPL QL IA: NEGATIVE
HIV 1+2 AB+HIV1 P24 AG SERPL QL IA: NEGATIVE
PROT UR-MCNC: <7 MG/DL (ref 0–15)
PROT/CREAT UR: NORMAL MG/G{CREAT} (ref 0–0.2)
RPR SER QL: NORMAL
RUBV IGG SER-ACNC: 40.5 IU/ML
RUBV IGG SER-IMP: REACTIVE

## 2021-04-15 LAB
BACTERIA UR CULT: NO GROWTH
C TRACH RRNA SPEC QL NAA+PROBE: NEGATIVE
N GONORRHOEA RRNA SPEC QL NAA+PROBE: NEGATIVE
VARICELLA INTERPRETATION: POSITIVE
VARICELLA ZOSTER IGG: 4.61 ISR (ref 0–0.9)

## 2021-04-19 LAB
FINAL PATHOLOGIC DIAGNOSIS: NORMAL
Lab: NORMAL

## 2021-04-20 ENCOUNTER — ROUTINE PRENATAL (OUTPATIENT)
Dept: OBSTETRICS AND GYNECOLOGY | Facility: CLINIC | Age: 31
End: 2021-04-20
Payer: MEDICAID

## 2021-04-20 ENCOUNTER — PROCEDURE VISIT (OUTPATIENT)
Dept: OBSTETRICS AND GYNECOLOGY | Facility: CLINIC | Age: 31
End: 2021-04-20
Payer: MEDICAID

## 2021-04-20 VITALS — SYSTOLIC BLOOD PRESSURE: 124 MMHG | BODY MASS INDEX: 33.2 KG/M2 | WEIGHT: 199.5 LBS | DIASTOLIC BLOOD PRESSURE: 70 MMHG

## 2021-04-20 DIAGNOSIS — N91.2 AMENORRHEA: ICD-10-CM

## 2021-04-20 DIAGNOSIS — O28.3 ABNORMAL FETAL ULTRASOUND: ICD-10-CM

## 2021-04-20 DIAGNOSIS — Z3A.10 10 WEEKS GESTATION OF PREGNANCY: ICD-10-CM

## 2021-04-20 DIAGNOSIS — Z34.81 ENCOUNTER FOR SUPERVISION OF OTHER NORMAL PREGNANCY IN FIRST TRIMESTER: Primary | ICD-10-CM

## 2021-04-20 PROCEDURE — 99999 PR PBB SHADOW E&M-EST. PATIENT-LVL III: CPT | Mod: PBBFAC,,, | Performed by: OBSTETRICS & GYNECOLOGY

## 2021-04-20 PROCEDURE — 99213 OFFICE O/P EST LOW 20 MIN: CPT | Mod: PBBFAC,TH,PO | Performed by: OBSTETRICS & GYNECOLOGY

## 2021-04-20 PROCEDURE — 99999 PR PBB SHADOW E&M-EST. PATIENT-LVL III: ICD-10-PCS | Mod: PBBFAC,,, | Performed by: OBSTETRICS & GYNECOLOGY

## 2021-04-20 PROCEDURE — 76817 TRANSVAGINAL US OBSTETRIC: CPT | Mod: PBBFAC,PO | Performed by: OBSTETRICS & GYNECOLOGY

## 2021-04-20 PROCEDURE — 76817 TRANSVAGINAL US OBSTETRIC: CPT | Mod: 26,S$PBB,, | Performed by: OBSTETRICS & GYNECOLOGY

## 2021-04-20 PROCEDURE — 99213 PR OFFICE/OUTPT VISIT, EST, LEVL III, 20-29 MIN: ICD-10-PCS | Mod: TH,S$PBB,, | Performed by: OBSTETRICS & GYNECOLOGY

## 2021-04-20 PROCEDURE — 99213 OFFICE O/P EST LOW 20 MIN: CPT | Mod: TH,S$PBB,, | Performed by: OBSTETRICS & GYNECOLOGY

## 2021-04-20 PROCEDURE — 76817 PR US, OB, TRANSVAG APPROACH: ICD-10-PCS | Mod: 26,S$PBB,, | Performed by: OBSTETRICS & GYNECOLOGY

## 2021-04-27 ENCOUNTER — TELEPHONE (OUTPATIENT)
Dept: OBSTETRICS AND GYNECOLOGY | Facility: CLINIC | Age: 31
End: 2021-04-27

## 2021-04-27 ENCOUNTER — PROCEDURE VISIT (OUTPATIENT)
Dept: MATERNAL FETAL MEDICINE | Facility: CLINIC | Age: 31
End: 2021-04-27
Attending: OBSTETRICS & GYNECOLOGY
Payer: MEDICAID

## 2021-04-27 VITALS
DIASTOLIC BLOOD PRESSURE: 84 MMHG | BODY MASS INDEX: 33.02 KG/M2 | HEIGHT: 65 IN | WEIGHT: 198.19 LBS | SYSTOLIC BLOOD PRESSURE: 136 MMHG

## 2021-04-27 DIAGNOSIS — O28.5 ABNORMAL GENETIC TEST DURING PREGNANCY: ICD-10-CM

## 2021-04-27 DIAGNOSIS — O28.3 ABNORMAL FETAL ULTRASOUND: ICD-10-CM

## 2021-04-27 DIAGNOSIS — O35.8XX0 CYSTIC HYGROMA OF FETUS IN SINGLETON PREGNANCY: ICD-10-CM

## 2021-04-27 PROCEDURE — 99999 PR PBB SHADOW E&M-EST. PATIENT-LVL I: CPT | Mod: PBBFAC,,, | Performed by: OBSTETRICS & GYNECOLOGY

## 2021-04-27 PROCEDURE — 99999 PR PBB SHADOW E&M-EST. PATIENT-LVL I: ICD-10-PCS | Mod: PBBFAC,,, | Performed by: OBSTETRICS & GYNECOLOGY

## 2021-04-27 PROCEDURE — 99204 OFFICE O/P NEW MOD 45 MIN: CPT | Mod: 25,S$PBB,TH, | Performed by: OBSTETRICS & GYNECOLOGY

## 2021-04-27 PROCEDURE — 99204 PR OFFICE/OUTPT VISIT, NEW, LEVL IV, 45-59 MIN: ICD-10-PCS | Mod: 25,S$PBB,TH, | Performed by: OBSTETRICS & GYNECOLOGY

## 2021-04-27 PROCEDURE — 99211 OFF/OP EST MAY X REQ PHY/QHP: CPT | Mod: PBBFAC,TH | Performed by: OBSTETRICS & GYNECOLOGY

## 2021-04-27 PROCEDURE — 76801 OB US < 14 WKS SINGLE FETUS: CPT | Mod: 26,S$PBB,, | Performed by: OBSTETRICS & GYNECOLOGY

## 2021-04-27 PROCEDURE — 76801 PR US, OB <14WKS, TRANSABD, SINGLE GESTATION: ICD-10-PCS | Mod: 26,S$PBB,, | Performed by: OBSTETRICS & GYNECOLOGY

## 2021-04-27 PROCEDURE — 76801 OB US < 14 WKS SINGLE FETUS: CPT | Mod: PBBFAC | Performed by: OBSTETRICS & GYNECOLOGY

## 2021-04-29 ENCOUNTER — PROCEDURE VISIT (OUTPATIENT)
Dept: MATERNAL FETAL MEDICINE | Facility: CLINIC | Age: 31
End: 2021-04-29
Payer: MEDICAID

## 2021-04-29 ENCOUNTER — OFFICE VISIT (OUTPATIENT)
Dept: MATERNAL FETAL MEDICINE | Facility: CLINIC | Age: 31
End: 2021-04-29
Payer: MEDICAID

## 2021-04-29 VITALS
HEIGHT: 65 IN | BODY MASS INDEX: 33.16 KG/M2 | WEIGHT: 199.06 LBS | DIASTOLIC BLOOD PRESSURE: 84 MMHG | SYSTOLIC BLOOD PRESSURE: 122 MMHG

## 2021-04-29 DIAGNOSIS — O28.3 ABNORMAL FETAL ULTRASOUND: ICD-10-CM

## 2021-04-29 DIAGNOSIS — O35.8XX0 FETAL CYSTIC HYGROMA, SINGLE GESTATION: ICD-10-CM

## 2021-04-29 DIAGNOSIS — O28.5 ABNORMAL GENETIC TEST DURING PREGNANCY: ICD-10-CM

## 2021-04-29 DIAGNOSIS — O28.9 ABNORMAL FINDING ON ANTENATAL SCREEN: Primary | ICD-10-CM

## 2021-04-29 PROCEDURE — 76945 ECHO GUIDE VILLUS SAMPLING: CPT | Mod: 26,S$PBB,, | Performed by: OBSTETRICS & GYNECOLOGY

## 2021-04-29 PROCEDURE — 59015 CHORION BIOPSY: CPT | Mod: S$PBB,,, | Performed by: OBSTETRICS & GYNECOLOGY

## 2021-04-29 PROCEDURE — 99213 OFFICE O/P EST LOW 20 MIN: CPT | Mod: 25,S$PBB,TH, | Performed by: OBSTETRICS & GYNECOLOGY

## 2021-04-29 PROCEDURE — 76945 ECHO GUIDE VILLUS SAMPLING: CPT | Mod: PBBFAC | Performed by: OBSTETRICS & GYNECOLOGY

## 2021-04-29 PROCEDURE — 99213 PR OFFICE/OUTPT VISIT, EST, LEVL III, 20-29 MIN: ICD-10-PCS | Mod: 25,S$PBB,TH, | Performed by: OBSTETRICS & GYNECOLOGY

## 2021-04-29 PROCEDURE — 76945 PR  SO2 GUIDE CHOR VILL SAMPLING: ICD-10-PCS | Mod: 26,S$PBB,, | Performed by: OBSTETRICS & GYNECOLOGY

## 2021-04-29 PROCEDURE — 59015 PR CHORION VILLUS BIOPSY: ICD-10-PCS | Mod: S$PBB,,, | Performed by: OBSTETRICS & GYNECOLOGY

## 2021-04-29 PROCEDURE — 59015 CHORION BIOPSY: CPT | Mod: PBBFAC | Performed by: OBSTETRICS & GYNECOLOGY

## 2021-04-30 ENCOUNTER — PATIENT MESSAGE (OUTPATIENT)
Dept: ADMINISTRATIVE | Facility: OTHER | Age: 31
End: 2021-04-30

## 2021-05-03 ENCOUNTER — TELEPHONE (OUTPATIENT)
Dept: MATERNAL FETAL MEDICINE | Facility: CLINIC | Age: 31
End: 2021-05-03

## 2021-05-06 ENCOUNTER — ROUTINE PRENATAL (OUTPATIENT)
Dept: OBSTETRICS AND GYNECOLOGY | Facility: CLINIC | Age: 31
End: 2021-05-06
Payer: MEDICAID

## 2021-05-06 VITALS
WEIGHT: 196.63 LBS | BODY MASS INDEX: 32.72 KG/M2 | DIASTOLIC BLOOD PRESSURE: 60 MMHG | SYSTOLIC BLOOD PRESSURE: 120 MMHG

## 2021-05-06 DIAGNOSIS — Z34.01 ENCOUNTER FOR SUPERVISION OF NORMAL FIRST PREGNANCY IN FIRST TRIMESTER: Primary | ICD-10-CM

## 2021-05-06 DIAGNOSIS — Z3A.12 12 WEEKS GESTATION OF PREGNANCY: ICD-10-CM

## 2021-05-06 PROCEDURE — 99213 OFFICE O/P EST LOW 20 MIN: CPT | Mod: PBBFAC,TH,PO | Performed by: OBSTETRICS & GYNECOLOGY

## 2021-05-06 PROCEDURE — 99999 PR PBB SHADOW E&M-EST. PATIENT-LVL III: CPT | Mod: PBBFAC,,, | Performed by: OBSTETRICS & GYNECOLOGY

## 2021-05-06 PROCEDURE — 99213 PR OFFICE/OUTPT VISIT, EST, LEVL III, 20-29 MIN: ICD-10-PCS | Mod: TH,S$PBB,, | Performed by: OBSTETRICS & GYNECOLOGY

## 2021-05-06 PROCEDURE — 99213 OFFICE O/P EST LOW 20 MIN: CPT | Mod: TH,S$PBB,, | Performed by: OBSTETRICS & GYNECOLOGY

## 2021-05-06 PROCEDURE — 99999 PR PBB SHADOW E&M-EST. PATIENT-LVL III: ICD-10-PCS | Mod: PBBFAC,,, | Performed by: OBSTETRICS & GYNECOLOGY

## 2021-05-07 ENCOUNTER — PATIENT MESSAGE (OUTPATIENT)
Dept: ADMINISTRATIVE | Facility: OTHER | Age: 31
End: 2021-05-07

## 2021-05-07 ENCOUNTER — TELEPHONE (OUTPATIENT)
Dept: MATERNAL FETAL MEDICINE | Facility: CLINIC | Age: 31
End: 2021-05-07

## 2021-06-18 ENCOUNTER — OFFICE VISIT (OUTPATIENT)
Dept: OBSTETRICS AND GYNECOLOGY | Facility: CLINIC | Age: 31
End: 2021-06-18
Payer: MEDICAID

## 2021-06-18 ENCOUNTER — LAB VISIT (OUTPATIENT)
Dept: LAB | Facility: HOSPITAL | Age: 31
End: 2021-06-18
Attending: OBSTETRICS & GYNECOLOGY
Payer: MEDICAID

## 2021-06-18 VITALS
HEIGHT: 65 IN | WEIGHT: 197.06 LBS | BODY MASS INDEX: 32.83 KG/M2 | SYSTOLIC BLOOD PRESSURE: 110 MMHG | DIASTOLIC BLOOD PRESSURE: 60 MMHG

## 2021-06-18 DIAGNOSIS — Z30.016 ENCOUNTER FOR INITIAL PRESCRIPTION OF TRANSDERMAL PATCH HORMONAL CONTRACEPTIVE DEVICE: ICD-10-CM

## 2021-06-18 DIAGNOSIS — N91.2 AMENORRHEA: Primary | ICD-10-CM

## 2021-06-18 DIAGNOSIS — N91.2 AMENORRHEA: ICD-10-CM

## 2021-06-18 LAB — HCG INTACT+B SERPL-ACNC: 76 MIU/ML

## 2021-06-18 PROCEDURE — 99213 OFFICE O/P EST LOW 20 MIN: CPT | Mod: PBBFAC,PO | Performed by: OBSTETRICS & GYNECOLOGY

## 2021-06-18 PROCEDURE — 84702 CHORIONIC GONADOTROPIN TEST: CPT | Performed by: OBSTETRICS & GYNECOLOGY

## 2021-06-18 PROCEDURE — 99999 PR PBB SHADOW E&M-EST. PATIENT-LVL III: CPT | Mod: PBBFAC,,, | Performed by: OBSTETRICS & GYNECOLOGY

## 2021-06-18 PROCEDURE — 99213 PR OFFICE/OUTPT VISIT, EST, LEVL III, 20-29 MIN: ICD-10-PCS | Mod: S$PBB,,, | Performed by: OBSTETRICS & GYNECOLOGY

## 2021-06-18 PROCEDURE — 99999 PR PBB SHADOW E&M-EST. PATIENT-LVL III: ICD-10-PCS | Mod: PBBFAC,,, | Performed by: OBSTETRICS & GYNECOLOGY

## 2021-06-18 PROCEDURE — 36415 COLL VENOUS BLD VENIPUNCTURE: CPT | Performed by: OBSTETRICS & GYNECOLOGY

## 2021-06-18 PROCEDURE — 99213 OFFICE O/P EST LOW 20 MIN: CPT | Mod: S$PBB,,, | Performed by: OBSTETRICS & GYNECOLOGY

## 2021-06-18 RX ORDER — NORELGESTROMIN AND ETHINYL ESTRADIOL 35; 150 UG/MG; UG/MG
PATCH TRANSDERMAL
Qty: 3 PATCH | Refills: 11 | Status: SHIPPED | OUTPATIENT
Start: 2021-06-18 | End: 2021-10-18

## 2021-09-13 DIAGNOSIS — N91.2 AMENORRHEA: Primary | ICD-10-CM

## 2021-10-07 ENCOUNTER — PROCEDURE VISIT (OUTPATIENT)
Dept: OBSTETRICS AND GYNECOLOGY | Facility: CLINIC | Age: 31
End: 2021-10-07
Payer: MEDICAID

## 2021-10-07 DIAGNOSIS — N91.2 AMENORRHEA: ICD-10-CM

## 2021-10-07 DIAGNOSIS — Z36.89 ESTABLISH GESTATIONAL AGE, ULTRASOUND: ICD-10-CM

## 2021-10-07 PROCEDURE — 76801 OB US < 14 WKS SINGLE FETUS: CPT | Mod: 26,S$PBB,, | Performed by: OBSTETRICS & GYNECOLOGY

## 2021-10-07 PROCEDURE — 76801 PR US, OB <14WKS, TRANSABD, SINGLE GESTATION: ICD-10-PCS | Mod: 26,S$PBB,, | Performed by: OBSTETRICS & GYNECOLOGY

## 2021-10-07 PROCEDURE — 76801 OB US < 14 WKS SINGLE FETUS: CPT | Mod: PBBFAC,PO | Performed by: OBSTETRICS & GYNECOLOGY

## 2021-10-14 ENCOUNTER — PATIENT OUTREACH (OUTPATIENT)
Dept: ADMINISTRATIVE | Facility: OTHER | Age: 31
End: 2021-10-14

## 2021-10-18 ENCOUNTER — OFFICE VISIT (OUTPATIENT)
Dept: OBSTETRICS AND GYNECOLOGY | Facility: CLINIC | Age: 31
End: 2021-10-18
Payer: MEDICAID

## 2021-10-18 VITALS
HEIGHT: 65 IN | SYSTOLIC BLOOD PRESSURE: 118 MMHG | WEIGHT: 192 LBS | BODY MASS INDEX: 31.99 KG/M2 | DIASTOLIC BLOOD PRESSURE: 80 MMHG

## 2021-10-18 DIAGNOSIS — O21.9 NAUSEA AND VOMITING DURING PREGNANCY: ICD-10-CM

## 2021-10-18 DIAGNOSIS — Z34.81 ENCOUNTER FOR SUPERVISION OF OTHER NORMAL PREGNANCY IN FIRST TRIMESTER: Primary | ICD-10-CM

## 2021-10-18 DIAGNOSIS — O26.899 DYSURIA DURING PREGNANCY, ANTEPARTUM: ICD-10-CM

## 2021-10-18 DIAGNOSIS — R30.0 DYSURIA DURING PREGNANCY, ANTEPARTUM: ICD-10-CM

## 2021-10-18 PROCEDURE — 99999 PR PBB SHADOW E&M-EST. PATIENT-LVL III: ICD-10-PCS | Mod: PBBFAC,,, | Performed by: OBSTETRICS & GYNECOLOGY

## 2021-10-18 PROCEDURE — 99213 PR OFFICE/OUTPT VISIT, EST, LEVL III, 20-29 MIN: ICD-10-PCS | Mod: TH,S$PBB,, | Performed by: OBSTETRICS & GYNECOLOGY

## 2021-10-18 PROCEDURE — 99999 PR PBB SHADOW E&M-EST. PATIENT-LVL III: CPT | Mod: PBBFAC,,, | Performed by: OBSTETRICS & GYNECOLOGY

## 2021-10-18 PROCEDURE — 99213 OFFICE O/P EST LOW 20 MIN: CPT | Mod: TH,S$PBB,, | Performed by: OBSTETRICS & GYNECOLOGY

## 2021-10-18 PROCEDURE — 99213 OFFICE O/P EST LOW 20 MIN: CPT | Mod: PBBFAC,TH,PO | Performed by: OBSTETRICS & GYNECOLOGY

## 2021-10-18 PROCEDURE — 87086 URINE CULTURE/COLONY COUNT: CPT | Performed by: OBSTETRICS & GYNECOLOGY

## 2021-10-18 RX ORDER — CEPHALEXIN 500 MG/1
500 CAPSULE ORAL EVERY 12 HOURS
Qty: 20 CAPSULE | Refills: 0 | Status: SHIPPED | OUTPATIENT
Start: 2021-10-18 | End: 2021-10-28

## 2021-10-18 RX ORDER — ONDANSETRON 4 MG/1
4 TABLET, ORALLY DISINTEGRATING ORAL EVERY 8 HOURS PRN
Qty: 30 TABLET | Refills: 2 | Status: SHIPPED | OUTPATIENT
Start: 2021-10-18 | End: 2022-11-15

## 2021-10-19 ENCOUNTER — LAB VISIT (OUTPATIENT)
Dept: LAB | Facility: HOSPITAL | Age: 31
End: 2021-10-19
Attending: OBSTETRICS & GYNECOLOGY
Payer: MEDICAID

## 2021-10-19 DIAGNOSIS — Z34.81 ENCOUNTER FOR SUPERVISION OF OTHER NORMAL PREGNANCY IN FIRST TRIMESTER: ICD-10-CM

## 2021-10-19 LAB
ABO + RH BLD: NORMAL
BASOPHILS # BLD AUTO: 0.02 K/UL (ref 0–0.2)
BASOPHILS NFR BLD: 0.3 % (ref 0–1.9)
BLD GP AB SCN CELLS X3 SERPL QL: NORMAL
DIFFERENTIAL METHOD: ABNORMAL
EOSINOPHIL # BLD AUTO: 0 K/UL (ref 0–0.5)
EOSINOPHIL NFR BLD: 0.4 % (ref 0–8)
ERYTHROCYTE [DISTWIDTH] IN BLOOD BY AUTOMATED COUNT: 13.1 % (ref 11.5–14.5)
HCT VFR BLD AUTO: 35.1 % (ref 37–48.5)
HGB BLD-MCNC: 12.6 G/DL (ref 12–16)
IMM GRANULOCYTES # BLD AUTO: 0.01 K/UL (ref 0–0.04)
IMM GRANULOCYTES NFR BLD AUTO: 0.1 % (ref 0–0.5)
LYMPHOCYTES # BLD AUTO: 1.4 K/UL (ref 1–4.8)
LYMPHOCYTES NFR BLD: 20.5 % (ref 18–48)
MCH RBC QN AUTO: 27.9 PG (ref 27–31)
MCHC RBC AUTO-ENTMCNC: 35.9 G/DL (ref 32–36)
MCV RBC AUTO: 78 FL (ref 82–98)
MONOCYTES # BLD AUTO: 0.5 K/UL (ref 0.3–1)
MONOCYTES NFR BLD: 7 % (ref 4–15)
NEUTROPHILS # BLD AUTO: 4.8 K/UL (ref 1.8–7.7)
NEUTROPHILS NFR BLD: 71.7 % (ref 38–73)
NRBC BLD-RTO: 0 /100 WBC
PLATELET # BLD AUTO: 268 K/UL (ref 150–450)
PMV BLD AUTO: 9.5 FL (ref 9.2–12.9)
RBC # BLD AUTO: 4.51 M/UL (ref 4–5.4)
WBC # BLD AUTO: 6.73 K/UL (ref 3.9–12.7)

## 2021-10-19 PROCEDURE — 86762 RUBELLA ANTIBODY: CPT | Performed by: OBSTETRICS & GYNECOLOGY

## 2021-10-19 PROCEDURE — 36415 COLL VENOUS BLD VENIPUNCTURE: CPT | Performed by: OBSTETRICS & GYNECOLOGY

## 2021-10-19 PROCEDURE — 86900 BLOOD TYPING SEROLOGIC ABO: CPT | Performed by: OBSTETRICS & GYNECOLOGY

## 2021-10-19 PROCEDURE — 87340 HEPATITIS B SURFACE AG IA: CPT | Performed by: OBSTETRICS & GYNECOLOGY

## 2021-10-19 PROCEDURE — 85025 COMPLETE CBC W/AUTO DIFF WBC: CPT | Performed by: OBSTETRICS & GYNECOLOGY

## 2021-10-19 PROCEDURE — 87389 HIV-1 AG W/HIV-1&-2 AB AG IA: CPT | Performed by: OBSTETRICS & GYNECOLOGY

## 2021-10-19 PROCEDURE — 86592 SYPHILIS TEST NON-TREP QUAL: CPT | Performed by: OBSTETRICS & GYNECOLOGY

## 2021-10-20 LAB
BACTERIA UR CULT: NORMAL
BACTERIA UR CULT: NORMAL
HBV SURFACE AG SERPL QL IA: NEGATIVE
HIV 1+2 AB+HIV1 P24 AG SERPL QL IA: NEGATIVE
RPR SER QL: NORMAL
RUBV IGG SER-ACNC: 44.5 IU/ML
RUBV IGG SER-IMP: REACTIVE

## 2021-10-29 ENCOUNTER — TELEPHONE (OUTPATIENT)
Dept: OBSTETRICS AND GYNECOLOGY | Facility: CLINIC | Age: 31
End: 2021-10-29
Payer: MEDICAID

## 2021-11-02 ENCOUNTER — TELEPHONE (OUTPATIENT)
Dept: OBSTETRICS AND GYNECOLOGY | Facility: CLINIC | Age: 31
End: 2021-11-02
Payer: MEDICAID

## 2021-11-08 ENCOUNTER — PATIENT MESSAGE (OUTPATIENT)
Dept: ADMINISTRATIVE | Facility: OTHER | Age: 31
End: 2021-11-08
Payer: MEDICAID

## 2021-11-15 ENCOUNTER — PATIENT MESSAGE (OUTPATIENT)
Dept: ADMINISTRATIVE | Facility: OTHER | Age: 31
End: 2021-11-15
Payer: MEDICAID

## 2021-11-17 ENCOUNTER — ROUTINE PRENATAL (OUTPATIENT)
Dept: OBSTETRICS AND GYNECOLOGY | Facility: CLINIC | Age: 31
End: 2021-11-17
Payer: MEDICAID

## 2021-11-17 ENCOUNTER — CLINICAL SUPPORT (OUTPATIENT)
Dept: OBSTETRICS AND GYNECOLOGY | Facility: CLINIC | Age: 31
End: 2021-11-17
Payer: MEDICAID

## 2021-11-17 VITALS — BODY MASS INDEX: 32.28 KG/M2 | WEIGHT: 194 LBS | DIASTOLIC BLOOD PRESSURE: 76 MMHG | SYSTOLIC BLOOD PRESSURE: 120 MMHG

## 2021-11-17 DIAGNOSIS — Z34.81 ENCOUNTER FOR SUPERVISION OF OTHER NORMAL PREGNANCY IN FIRST TRIMESTER: Primary | ICD-10-CM

## 2021-11-17 DIAGNOSIS — Z3A.13 13 WEEKS GESTATION OF PREGNANCY: ICD-10-CM

## 2021-11-17 DIAGNOSIS — Z23 NEED FOR PROPHYLACTIC VACCINATION AND INOCULATION AGAINST INFLUENZA: Primary | ICD-10-CM

## 2021-11-17 PROCEDURE — 99999 PR PBB SHADOW E&M-EST. PATIENT-LVL II: CPT | Mod: PBBFAC,,, | Performed by: OBSTETRICS & GYNECOLOGY

## 2021-11-17 PROCEDURE — 90471 IMMUNIZATION ADMIN: CPT | Mod: PBBFAC,PO

## 2021-11-17 PROCEDURE — 99213 OFFICE O/P EST LOW 20 MIN: CPT | Mod: TH,S$PBB,, | Performed by: OBSTETRICS & GYNECOLOGY

## 2021-11-17 PROCEDURE — 99212 OFFICE O/P EST SF 10 MIN: CPT | Mod: PBBFAC,TH,PO | Performed by: OBSTETRICS & GYNECOLOGY

## 2021-11-17 PROCEDURE — 99213 PR OFFICE/OUTPT VISIT, EST, LEVL III, 20-29 MIN: ICD-10-PCS | Mod: TH,S$PBB,, | Performed by: OBSTETRICS & GYNECOLOGY

## 2021-11-17 PROCEDURE — 99999 PR PBB SHADOW E&M-EST. PATIENT-LVL II: ICD-10-PCS | Mod: PBBFAC,,, | Performed by: OBSTETRICS & GYNECOLOGY

## 2021-11-30 LAB
GENETIC COUNSELING?: YES
GENETIC COUNSELING?: YES
GENSO SPECIMEN TYPE: NORMAL
GENSO SPECIMEN TYPE: NORMAL
MISCELLANEOUS GENETIC TEST NAME: NORMAL
MISCELLANEOUS GENETIC TEST NAME: NORMAL
PARTENTAL OR SIBLING TESTING?: NORMAL
PARTENTAL OR SIBLING TESTING?: NORMAL
REFERENCE LAB: NORMAL
REFERENCE LAB: NORMAL
TEST RESULT: NORMAL
TEST RESULT: NORMAL

## 2021-12-16 ENCOUNTER — ROUTINE PRENATAL (OUTPATIENT)
Dept: OBSTETRICS AND GYNECOLOGY | Facility: CLINIC | Age: 31
End: 2021-12-16
Payer: MEDICAID

## 2021-12-16 ENCOUNTER — TELEPHONE (OUTPATIENT)
Dept: OBSTETRICS AND GYNECOLOGY | Facility: CLINIC | Age: 31
End: 2021-12-16
Payer: MEDICAID

## 2021-12-16 VITALS
BODY MASS INDEX: 33.09 KG/M2 | SYSTOLIC BLOOD PRESSURE: 110 MMHG | DIASTOLIC BLOOD PRESSURE: 70 MMHG | WEIGHT: 198.88 LBS

## 2021-12-16 DIAGNOSIS — N91.2 AMENORRHEA: Primary | ICD-10-CM

## 2021-12-16 PROCEDURE — 99999 PR PBB SHADOW E&M-EST. PATIENT-LVL II: CPT | Mod: PBBFAC,,, | Performed by: OBSTETRICS & GYNECOLOGY

## 2021-12-16 PROCEDURE — 99213 OFFICE O/P EST LOW 20 MIN: CPT | Mod: TH,S$PBB,, | Performed by: OBSTETRICS & GYNECOLOGY

## 2021-12-16 PROCEDURE — 99213 PR OFFICE/OUTPT VISIT, EST, LEVL III, 20-29 MIN: ICD-10-PCS | Mod: TH,S$PBB,, | Performed by: OBSTETRICS & GYNECOLOGY

## 2021-12-16 PROCEDURE — 99212 OFFICE O/P EST SF 10 MIN: CPT | Mod: PBBFAC,PO | Performed by: OBSTETRICS & GYNECOLOGY

## 2021-12-16 PROCEDURE — 99999 PR PBB SHADOW E&M-EST. PATIENT-LVL II: ICD-10-PCS | Mod: PBBFAC,,, | Performed by: OBSTETRICS & GYNECOLOGY

## 2022-01-05 ENCOUNTER — ROUTINE PRENATAL (OUTPATIENT)
Dept: OBSTETRICS AND GYNECOLOGY | Facility: CLINIC | Age: 32
End: 2022-01-05
Payer: MEDICAID

## 2022-01-05 ENCOUNTER — PROCEDURE VISIT (OUTPATIENT)
Dept: OBSTETRICS AND GYNECOLOGY | Facility: CLINIC | Age: 32
End: 2022-01-05
Payer: MEDICAID

## 2022-01-05 VITALS
BODY MASS INDEX: 34.08 KG/M2 | WEIGHT: 204.81 LBS | SYSTOLIC BLOOD PRESSURE: 120 MMHG | DIASTOLIC BLOOD PRESSURE: 72 MMHG

## 2022-01-05 DIAGNOSIS — N91.2 AMENORRHEA: ICD-10-CM

## 2022-01-05 DIAGNOSIS — Z3A.20 20 WEEKS GESTATION OF PREGNANCY: ICD-10-CM

## 2022-01-05 DIAGNOSIS — Z34.82 ENCOUNTER FOR SUPERVISION OF OTHER NORMAL PREGNANCY IN SECOND TRIMESTER: Primary | ICD-10-CM

## 2022-01-05 PROCEDURE — 99213 OFFICE O/P EST LOW 20 MIN: CPT | Mod: TH,S$PBB,, | Performed by: OBSTETRICS & GYNECOLOGY

## 2022-01-05 PROCEDURE — 99212 OFFICE O/P EST SF 10 MIN: CPT | Mod: PBBFAC,TH,PO,25 | Performed by: OBSTETRICS & GYNECOLOGY

## 2022-01-05 PROCEDURE — 76805 US OB/GYN PROCEDURE (VIEWPOINT): ICD-10-PCS | Mod: 26,S$PBB,, | Performed by: OBSTETRICS & GYNECOLOGY

## 2022-01-05 PROCEDURE — 99213 PR OFFICE/OUTPT VISIT, EST, LEVL III, 20-29 MIN: ICD-10-PCS | Mod: TH,S$PBB,, | Performed by: OBSTETRICS & GYNECOLOGY

## 2022-01-05 PROCEDURE — 99999 PR PBB SHADOW E&M-EST. PATIENT-LVL II: ICD-10-PCS | Mod: PBBFAC,,, | Performed by: OBSTETRICS & GYNECOLOGY

## 2022-01-05 PROCEDURE — 99999 PR PBB SHADOW E&M-EST. PATIENT-LVL II: CPT | Mod: PBBFAC,,, | Performed by: OBSTETRICS & GYNECOLOGY

## 2022-01-05 PROCEDURE — 76805 OB US >/= 14 WKS SNGL FETUS: CPT | Mod: PBBFAC,PO | Performed by: OBSTETRICS & GYNECOLOGY

## 2022-01-05 NOTE — PROGRESS NOTES
No OB complaints today.    32 yo now  female @ 20.2 wks by 7.3 wk sono (EDC 2022) who presents for f/u OB visit    1) SIUP (it's a girl)  -s/p official dating scan  -Rh positive   -mat 21 neg  -consents for rLTCS/blood signed 2022    2) h/o previous LTCS    3) PP  S/p flu vaccine    F/u in 4 wks OB visit, f/u anatomy and 1 hr gtt at next visit    sheryl amato MD

## 2022-01-06 ENCOUNTER — PATIENT MESSAGE (OUTPATIENT)
Dept: OBSTETRICS AND GYNECOLOGY | Facility: CLINIC | Age: 32
End: 2022-01-06
Payer: MEDICAID

## 2022-02-03 ENCOUNTER — LAB VISIT (OUTPATIENT)
Dept: LAB | Facility: HOSPITAL | Age: 32
End: 2022-02-03
Attending: OBSTETRICS & GYNECOLOGY
Payer: MEDICAID

## 2022-02-03 ENCOUNTER — PROCEDURE VISIT (OUTPATIENT)
Dept: OBSTETRICS AND GYNECOLOGY | Facility: CLINIC | Age: 32
End: 2022-02-03
Payer: MEDICAID

## 2022-02-03 ENCOUNTER — ROUTINE PRENATAL (OUTPATIENT)
Dept: OBSTETRICS AND GYNECOLOGY | Facility: CLINIC | Age: 32
End: 2022-02-03
Payer: MEDICAID

## 2022-02-03 VITALS — DIASTOLIC BLOOD PRESSURE: 73 MMHG | BODY MASS INDEX: 34.6 KG/M2 | WEIGHT: 207.88 LBS | SYSTOLIC BLOOD PRESSURE: 110 MMHG

## 2022-02-03 DIAGNOSIS — Z34.82 ENCOUNTER FOR SUPERVISION OF OTHER NORMAL PREGNANCY IN SECOND TRIMESTER: Primary | ICD-10-CM

## 2022-02-03 DIAGNOSIS — Z36.4 ANTENATAL SCREENING FOR FETAL GROWTH RETARDATION USING ULTRASONICS: ICD-10-CM

## 2022-02-03 DIAGNOSIS — Z34.82 ENCOUNTER FOR SUPERVISION OF OTHER NORMAL PREGNANCY IN SECOND TRIMESTER: ICD-10-CM

## 2022-02-03 DIAGNOSIS — Z36.2 ANTENATAL SCREENING BASED ON AMNIOCENTESIS: ICD-10-CM

## 2022-02-03 DIAGNOSIS — Z3A.24 24 WEEKS GESTATION OF PREGNANCY: ICD-10-CM

## 2022-02-03 DIAGNOSIS — Z3A.20 20 WEEKS GESTATION OF PREGNANCY: ICD-10-CM

## 2022-02-03 LAB
BASOPHILS # BLD AUTO: 0.04 K/UL (ref 0–0.2)
BASOPHILS NFR BLD: 0.4 % (ref 0–1.9)
DIFFERENTIAL METHOD: ABNORMAL
EOSINOPHIL # BLD AUTO: 0.1 K/UL (ref 0–0.5)
EOSINOPHIL NFR BLD: 0.7 % (ref 0–8)
ERYTHROCYTE [DISTWIDTH] IN BLOOD BY AUTOMATED COUNT: 12.5 % (ref 11.5–14.5)
GLUCOSE SERPL-MCNC: 88 MG/DL (ref 70–140)
HCT VFR BLD AUTO: 28.8 % (ref 37–48.5)
HGB BLD-MCNC: 10.1 G/DL (ref 12–16)
IMM GRANULOCYTES # BLD AUTO: 0.17 K/UL (ref 0–0.04)
IMM GRANULOCYTES NFR BLD AUTO: 1.7 % (ref 0–0.5)
LYMPHOCYTES # BLD AUTO: 1.5 K/UL (ref 1–4.8)
LYMPHOCYTES NFR BLD: 15.4 % (ref 18–48)
MCH RBC QN AUTO: 28.9 PG (ref 27–31)
MCHC RBC AUTO-ENTMCNC: 35.1 G/DL (ref 32–36)
MCV RBC AUTO: 82 FL (ref 82–98)
MONOCYTES # BLD AUTO: 0.9 K/UL (ref 0.3–1)
MONOCYTES NFR BLD: 9.1 % (ref 4–15)
NEUTROPHILS # BLD AUTO: 7.1 K/UL (ref 1.8–7.7)
NEUTROPHILS NFR BLD: 72.7 % (ref 38–73)
NRBC BLD-RTO: 0 /100 WBC
PLATELET # BLD AUTO: 223 K/UL (ref 150–450)
PMV BLD AUTO: 9.4 FL (ref 9.2–12.9)
RBC # BLD AUTO: 3.5 M/UL (ref 4–5.4)
WBC # BLD AUTO: 9.73 K/UL (ref 3.9–12.7)

## 2022-02-03 PROCEDURE — 85025 COMPLETE CBC W/AUTO DIFF WBC: CPT | Performed by: OBSTETRICS & GYNECOLOGY

## 2022-02-03 PROCEDURE — 76816 OB US FOLLOW-UP PER FETUS: CPT | Mod: 26,S$PBB,, | Performed by: OBSTETRICS & GYNECOLOGY

## 2022-02-03 PROCEDURE — 99999 PR PBB SHADOW E&M-EST. PATIENT-LVL II: ICD-10-PCS | Mod: PBBFAC,,, | Performed by: OBSTETRICS & GYNECOLOGY

## 2022-02-03 PROCEDURE — 76816 OB US FOLLOW-UP PER FETUS: CPT | Mod: PBBFAC,PO | Performed by: OBSTETRICS & GYNECOLOGY

## 2022-02-03 PROCEDURE — 76816 PR  US,PREGNANT UTERUS,F/U,TRANSABD APP: ICD-10-PCS | Mod: 26,S$PBB,, | Performed by: OBSTETRICS & GYNECOLOGY

## 2022-02-03 PROCEDURE — 99212 OFFICE O/P EST SF 10 MIN: CPT | Mod: PBBFAC,TH,PO | Performed by: OBSTETRICS & GYNECOLOGY

## 2022-02-03 PROCEDURE — 99213 OFFICE O/P EST LOW 20 MIN: CPT | Mod: TH,S$PBB,, | Performed by: OBSTETRICS & GYNECOLOGY

## 2022-02-03 PROCEDURE — 99999 PR PBB SHADOW E&M-EST. PATIENT-LVL II: CPT | Mod: PBBFAC,,, | Performed by: OBSTETRICS & GYNECOLOGY

## 2022-02-03 PROCEDURE — 36415 COLL VENOUS BLD VENIPUNCTURE: CPT | Performed by: OBSTETRICS & GYNECOLOGY

## 2022-02-03 PROCEDURE — 82950 GLUCOSE TEST: CPT | Performed by: OBSTETRICS & GYNECOLOGY

## 2022-02-03 PROCEDURE — 99213 PR OFFICE/OUTPT VISIT, EST, LEVL III, 20-29 MIN: ICD-10-PCS | Mod: TH,S$PBB,, | Performed by: OBSTETRICS & GYNECOLOGY

## 2022-02-03 NOTE — PROGRESS NOTES
No OB complaints today. Good fetal movement.  Results from growth scan pending today.    32 yo now  female @ 24.3 wks by 7.3 wk sono (EDC 2022) who presents for f/u OB visit    1) SIUP (it's a girl)  -s/p official dating scan  -Rh positive   -mat 21 neg  -consents for rLTCS/blood signed 2022  -1 hr gtt    2) h/o previous LTCS    3) PP  S/p flu vaccine    F/u in 4 wks OB visit,1 hr gtt today    sheryl amato MD

## 2022-03-03 ENCOUNTER — ROUTINE PRENATAL (OUTPATIENT)
Dept: OBSTETRICS AND GYNECOLOGY | Facility: CLINIC | Age: 32
End: 2022-03-03
Payer: MEDICAID

## 2022-03-03 VITALS
WEIGHT: 212.31 LBS | BODY MASS INDEX: 35.33 KG/M2 | DIASTOLIC BLOOD PRESSURE: 74 MMHG | SYSTOLIC BLOOD PRESSURE: 115 MMHG

## 2022-03-03 DIAGNOSIS — M79.669 CALF PAIN, UNSPECIFIED LATERALITY: ICD-10-CM

## 2022-03-03 DIAGNOSIS — Z3A.28 28 WEEKS GESTATION OF PREGNANCY: ICD-10-CM

## 2022-03-03 DIAGNOSIS — Z34.83 ENCOUNTER FOR SUPERVISION OF OTHER NORMAL PREGNANCY IN THIRD TRIMESTER: Primary | ICD-10-CM

## 2022-03-03 PROCEDURE — 99999 PR PBB SHADOW E&M-EST. PATIENT-LVL III: CPT | Mod: PBBFAC,,, | Performed by: OBSTETRICS & GYNECOLOGY

## 2022-03-03 PROCEDURE — 99213 PR OFFICE/OUTPT VISIT, EST, LEVL III, 20-29 MIN: ICD-10-PCS | Mod: TH,S$PBB,, | Performed by: OBSTETRICS & GYNECOLOGY

## 2022-03-03 PROCEDURE — 99213 OFFICE O/P EST LOW 20 MIN: CPT | Mod: TH,S$PBB,, | Performed by: OBSTETRICS & GYNECOLOGY

## 2022-03-03 PROCEDURE — 99213 OFFICE O/P EST LOW 20 MIN: CPT | Mod: PBBFAC,TH,PO | Performed by: OBSTETRICS & GYNECOLOGY

## 2022-03-03 PROCEDURE — 99999 PR PBB SHADOW E&M-EST. PATIENT-LVL III: ICD-10-PCS | Mod: PBBFAC,,, | Performed by: OBSTETRICS & GYNECOLOGY

## 2022-03-03 NOTE — PROGRESS NOTES
No OB complaints today. Good fetal movement. Reports pains in both her legs. Excruciating pains and swelling. US ordered to check for DVT.    Size>>>dates, growth scan ordered    30 yo now  female @ 28.3 wks by 7.3 wk sono (EDC 2022) who presents for f/u OB visit    1) SIUP (it's a girl)  -s/p official dating scan  -Rh positive   -mat 21 neg  -consents for rLTCS/blood signed 2022  -1 hr gtt wnl    2) h/o previous LTCS: wants rLTCS    3) PP  S/p flu vaccine    Provided with info about tdap and contraception    sheryl amato MD

## 2022-03-04 ENCOUNTER — TELEPHONE (OUTPATIENT)
Dept: ADMINISTRATIVE | Facility: OTHER | Age: 32
End: 2022-03-04
Payer: MEDICAID

## 2022-03-07 ENCOUNTER — PROCEDURE VISIT (OUTPATIENT)
Dept: OBSTETRICS AND GYNECOLOGY | Facility: CLINIC | Age: 32
End: 2022-03-07
Payer: MEDICAID

## 2022-03-07 DIAGNOSIS — O26.849 FETAL SIZE INCONSISTENT WITH DATES: ICD-10-CM

## 2022-03-07 DIAGNOSIS — O26.849 FETAL SIZE INCONSISTENT WITH DATES: Primary | ICD-10-CM

## 2022-03-07 PROCEDURE — 76816 US OB/GYN PROCEDURE (VIEWPOINT): ICD-10-PCS | Mod: 26,S$PBB,, | Performed by: OBSTETRICS & GYNECOLOGY

## 2022-03-07 PROCEDURE — 76816 OB US FOLLOW-UP PER FETUS: CPT | Mod: PBBFAC,PO | Performed by: OBSTETRICS & GYNECOLOGY

## 2022-03-31 ENCOUNTER — ROUTINE PRENATAL (OUTPATIENT)
Dept: OBSTETRICS AND GYNECOLOGY | Facility: CLINIC | Age: 32
End: 2022-03-31
Payer: MEDICAID

## 2022-03-31 VITALS
BODY MASS INDEX: 36.43 KG/M2 | SYSTOLIC BLOOD PRESSURE: 111 MMHG | DIASTOLIC BLOOD PRESSURE: 73 MMHG | WEIGHT: 218.94 LBS

## 2022-03-31 DIAGNOSIS — Z34.83 ENCOUNTER FOR SUPERVISION OF OTHER NORMAL PREGNANCY IN THIRD TRIMESTER: ICD-10-CM

## 2022-03-31 DIAGNOSIS — Z3A.32 32 WEEKS GESTATION OF PREGNANCY: ICD-10-CM

## 2022-03-31 PROCEDURE — 99999 PR PBB SHADOW E&M-EST. PATIENT-LVL IV: ICD-10-PCS | Mod: PBBFAC,,, | Performed by: OBSTETRICS & GYNECOLOGY

## 2022-03-31 PROCEDURE — 99999 PR PBB SHADOW E&M-EST. PATIENT-LVL IV: CPT | Mod: PBBFAC,,, | Performed by: OBSTETRICS & GYNECOLOGY

## 2022-03-31 PROCEDURE — 99213 PR OFFICE/OUTPT VISIT, EST, LEVL III, 20-29 MIN: ICD-10-PCS | Mod: TH,S$PBB,, | Performed by: OBSTETRICS & GYNECOLOGY

## 2022-03-31 PROCEDURE — 99214 OFFICE O/P EST MOD 30 MIN: CPT | Mod: PBBFAC,TH,PO | Performed by: OBSTETRICS & GYNECOLOGY

## 2022-03-31 PROCEDURE — 99213 OFFICE O/P EST LOW 20 MIN: CPT | Mod: TH,S$PBB,, | Performed by: OBSTETRICS & GYNECOLOGY

## 2022-03-31 NOTE — PROGRESS NOTES
No OB complaints today. Good fetal movement.   Growth scan on wnl on 3/7/2022.    31 yo now  female @ 32.3 wks by 7.3 wk sono (EDC 2022) who presents for f/u OB visit    1) SIUP (it's a girl)  -s/p official dating scan  -Rh positive   -mat 21 neg  -consents for rLTCS/blood signed 2022  -1 hr gtt wnl    2) h/o previous LTCS: wants rLTCS    3) PP  S/p flu vaccine    Provided with info about tdap and contraception    sheryl amato MD

## 2022-04-22 NOTE — LACTATION NOTE
Seems pretty well compensated    On Entresto, carvedilol, lasix-advised to watch sodium in diet, no chest pain reported This note was copied from a baby's chart.    [facility]  Lactation Note - Baby    SUMMARY     Feeding Method    breastfeeding    Breastfeeding    breastfeeding, bilateral    LATCH Score    Latch: 2-->grasps breast, tongue down, lips flanged, rhythmic sucking  Audible Swallowin-->spontaneous and intermittent (24 hrs old)  Type of Nipple: 2-->everted (after stimulation)  Comfort (Breast/Nipple): 1-->filling, red/small blisters/bruises, mild/mod discomfort  Hold (Positioning): 1-->minimal assist, teach one side, mother does other, staff holds  Score: 8    Breastfeeding Supplementation         Nutrition Interventions    Hypoglycemia Management (Infant): breastfeeding promoted  Breastfeeding Support: assisted with latch, assisted with positioning, encouragement provided, support offered, feeding on demand promoted, feeding session observed, infant moved to breast, infant latch-on verified, infant stimulated to wakeful state, suck stimulated with breast milk

## 2022-04-28 ENCOUNTER — ROUTINE PRENATAL (OUTPATIENT)
Dept: OBSTETRICS AND GYNECOLOGY | Facility: CLINIC | Age: 32
End: 2022-04-28
Payer: MEDICAID

## 2022-04-28 VITALS
DIASTOLIC BLOOD PRESSURE: 72 MMHG | SYSTOLIC BLOOD PRESSURE: 113 MMHG | BODY MASS INDEX: 36.87 KG/M2 | WEIGHT: 221.56 LBS

## 2022-04-28 DIAGNOSIS — Z34.83 ENCOUNTER FOR SUPERVISION OF OTHER NORMAL PREGNANCY IN THIRD TRIMESTER: Primary | ICD-10-CM

## 2022-04-28 DIAGNOSIS — Z3A.36 36 WEEKS GESTATION OF PREGNANCY: ICD-10-CM

## 2022-04-28 PROCEDURE — 99213 OFFICE O/P EST LOW 20 MIN: CPT | Mod: TH,S$PBB,, | Performed by: OBSTETRICS & GYNECOLOGY

## 2022-04-28 PROCEDURE — 99213 PR OFFICE/OUTPT VISIT, EST, LEVL III, 20-29 MIN: ICD-10-PCS | Mod: TH,S$PBB,, | Performed by: OBSTETRICS & GYNECOLOGY

## 2022-04-28 PROCEDURE — 99999 PR PBB SHADOW E&M-EST. PATIENT-LVL II: ICD-10-PCS | Mod: PBBFAC,,, | Performed by: OBSTETRICS & GYNECOLOGY

## 2022-04-28 PROCEDURE — 99212 OFFICE O/P EST SF 10 MIN: CPT | Mod: PBBFAC,TH,PO | Performed by: OBSTETRICS & GYNECOLOGY

## 2022-04-28 PROCEDURE — 87081 CULTURE SCREEN ONLY: CPT | Performed by: OBSTETRICS & GYNECOLOGY

## 2022-04-28 PROCEDURE — 99999 PR PBB SHADOW E&M-EST. PATIENT-LVL II: CPT | Mod: PBBFAC,,, | Performed by: OBSTETRICS & GYNECOLOGY

## 2022-04-28 NOTE — PROGRESS NOTES
No OB complaints today. Good fetal movement.   Growth scan on wnl on 3/7/2022.    31 yo now  female @ 36.3 wks by 7.3 wk sono (EDC 2022) who presents for f/u OB visit    1) SIUP (it's a girl)  -s/p official dating scan  -Rh positive   -mat 21 neg  -consents for rLTCS/blood signed 2022  -1 hr gtt wnl  -3rd trimester labs today    2) h/o previous LTCS: wants rLTCS    3) PP  S/p flu vaccine  Contraception: unsure  Wants tdap at next visit      sheryl amato MD

## 2022-05-02 LAB — BACTERIA SPEC AEROBE CULT: NORMAL

## 2022-05-11 ENCOUNTER — ROUTINE PRENATAL (OUTPATIENT)
Dept: OBSTETRICS AND GYNECOLOGY | Facility: CLINIC | Age: 32
End: 2022-05-11
Payer: MEDICAID

## 2022-05-11 ENCOUNTER — LAB VISIT (OUTPATIENT)
Dept: LAB | Facility: HOSPITAL | Age: 32
End: 2022-05-11
Attending: OBSTETRICS & GYNECOLOGY
Payer: MEDICAID

## 2022-05-11 ENCOUNTER — CLINICAL SUPPORT (OUTPATIENT)
Dept: OBSTETRICS AND GYNECOLOGY | Facility: CLINIC | Age: 32
End: 2022-05-11
Payer: MEDICAID

## 2022-05-11 VITALS — BODY MASS INDEX: 37.38 KG/M2 | WEIGHT: 224.63 LBS

## 2022-05-11 VITALS
BODY MASS INDEX: 37.13 KG/M2 | DIASTOLIC BLOOD PRESSURE: 74 MMHG | SYSTOLIC BLOOD PRESSURE: 122 MMHG | WEIGHT: 223.13 LBS

## 2022-05-11 DIAGNOSIS — Z3A.38 38 WEEKS GESTATION OF PREGNANCY: ICD-10-CM

## 2022-05-11 DIAGNOSIS — Z98.891 S/P CESAREAN SECTION: ICD-10-CM

## 2022-05-11 DIAGNOSIS — Z34.82 ENCOUNTER FOR SUPERVISION OF OTHER NORMAL PREGNANCY IN SECOND TRIMESTER: ICD-10-CM

## 2022-05-11 DIAGNOSIS — Z34.83 ENCOUNTER FOR SUPERVISION OF OTHER NORMAL PREGNANCY IN THIRD TRIMESTER: Primary | ICD-10-CM

## 2022-05-11 DIAGNOSIS — Z3A.36 36 WEEKS GESTATION OF PREGNANCY: ICD-10-CM

## 2022-05-11 DIAGNOSIS — Z3A.20 20 WEEKS GESTATION OF PREGNANCY: ICD-10-CM

## 2022-05-11 DIAGNOSIS — Z34.83 ENCOUNTER FOR SUPERVISION OF OTHER NORMAL PREGNANCY IN THIRD TRIMESTER: ICD-10-CM

## 2022-05-11 DIAGNOSIS — Z23 NEED FOR TETANUS, DIPHTHERIA, AND ACELLULAR PERTUSSIS (TDAP) VACCINE: Primary | ICD-10-CM

## 2022-05-11 LAB
BASOPHILS # BLD AUTO: 0.03 K/UL (ref 0–0.2)
BASOPHILS # BLD AUTO: 0.03 K/UL (ref 0–0.2)
BASOPHILS NFR BLD: 0.3 % (ref 0–1.9)
BASOPHILS NFR BLD: 0.3 % (ref 0–1.9)
DIFFERENTIAL METHOD: ABNORMAL
DIFFERENTIAL METHOD: ABNORMAL
EOSINOPHIL # BLD AUTO: 0.1 K/UL (ref 0–0.5)
EOSINOPHIL # BLD AUTO: 0.1 K/UL (ref 0–0.5)
EOSINOPHIL NFR BLD: 0.6 % (ref 0–8)
EOSINOPHIL NFR BLD: 0.6 % (ref 0–8)
ERYTHROCYTE [DISTWIDTH] IN BLOOD BY AUTOMATED COUNT: 14.5 % (ref 11.5–14.5)
ERYTHROCYTE [DISTWIDTH] IN BLOOD BY AUTOMATED COUNT: 14.5 % (ref 11.5–14.5)
GLUCOSE SERPL-MCNC: 110 MG/DL (ref 70–140)
HCT VFR BLD AUTO: 29.1 % (ref 37–48.5)
HCT VFR BLD AUTO: 29.1 % (ref 37–48.5)
HGB BLD-MCNC: 9.7 G/DL (ref 12–16)
HGB BLD-MCNC: 9.7 G/DL (ref 12–16)
IMM GRANULOCYTES # BLD AUTO: 0.09 K/UL (ref 0–0.04)
IMM GRANULOCYTES # BLD AUTO: 0.09 K/UL (ref 0–0.04)
IMM GRANULOCYTES NFR BLD AUTO: 1 % (ref 0–0.5)
IMM GRANULOCYTES NFR BLD AUTO: 1 % (ref 0–0.5)
LYMPHOCYTES # BLD AUTO: 1.3 K/UL (ref 1–4.8)
LYMPHOCYTES # BLD AUTO: 1.3 K/UL (ref 1–4.8)
LYMPHOCYTES NFR BLD: 14.5 % (ref 18–48)
LYMPHOCYTES NFR BLD: 14.5 % (ref 18–48)
MCH RBC QN AUTO: 26.3 PG (ref 27–31)
MCH RBC QN AUTO: 26.3 PG (ref 27–31)
MCHC RBC AUTO-ENTMCNC: 33.3 G/DL (ref 32–36)
MCHC RBC AUTO-ENTMCNC: 33.3 G/DL (ref 32–36)
MCV RBC AUTO: 79 FL (ref 82–98)
MCV RBC AUTO: 79 FL (ref 82–98)
MONOCYTES # BLD AUTO: 0.8 K/UL (ref 0.3–1)
MONOCYTES # BLD AUTO: 0.8 K/UL (ref 0.3–1)
MONOCYTES NFR BLD: 9.1 % (ref 4–15)
MONOCYTES NFR BLD: 9.1 % (ref 4–15)
NEUTROPHILS # BLD AUTO: 6.6 K/UL (ref 1.8–7.7)
NEUTROPHILS # BLD AUTO: 6.6 K/UL (ref 1.8–7.7)
NEUTROPHILS NFR BLD: 74.5 % (ref 38–73)
NEUTROPHILS NFR BLD: 74.5 % (ref 38–73)
NRBC BLD-RTO: 0 /100 WBC
NRBC BLD-RTO: 0 /100 WBC
PLATELET # BLD AUTO: 237 K/UL (ref 150–450)
PLATELET # BLD AUTO: 237 K/UL (ref 150–450)
PMV BLD AUTO: 9.6 FL (ref 9.2–12.9)
PMV BLD AUTO: 9.6 FL (ref 9.2–12.9)
RBC # BLD AUTO: 3.69 M/UL (ref 4–5.4)
RBC # BLD AUTO: 3.69 M/UL (ref 4–5.4)
WBC # BLD AUTO: 8.82 K/UL (ref 3.9–12.7)
WBC # BLD AUTO: 8.82 K/UL (ref 3.9–12.7)

## 2022-05-11 PROCEDURE — 86592 SYPHILIS TEST NON-TREP QUAL: CPT | Performed by: OBSTETRICS & GYNECOLOGY

## 2022-05-11 PROCEDURE — 90715 TDAP VACCINE 7 YRS/> IM: CPT | Mod: PBBFAC,PO

## 2022-05-11 PROCEDURE — 99999 PR PBB SHADOW E&M-EST. PATIENT-LVL II: CPT | Mod: PBBFAC,,, | Performed by: OBSTETRICS & GYNECOLOGY

## 2022-05-11 PROCEDURE — 85025 COMPLETE CBC W/AUTO DIFF WBC: CPT | Performed by: OBSTETRICS & GYNECOLOGY

## 2022-05-11 PROCEDURE — 99213 PR OFFICE/OUTPT VISIT, EST, LEVL III, 20-29 MIN: ICD-10-PCS | Mod: 25,S$PBB,TH, | Performed by: OBSTETRICS & GYNECOLOGY

## 2022-05-11 PROCEDURE — 99212 OFFICE O/P EST SF 10 MIN: CPT | Mod: PBBFAC,TH,PO | Performed by: OBSTETRICS & GYNECOLOGY

## 2022-05-11 PROCEDURE — 36415 COLL VENOUS BLD VENIPUNCTURE: CPT | Performed by: OBSTETRICS & GYNECOLOGY

## 2022-05-11 PROCEDURE — 87389 HIV-1 AG W/HIV-1&-2 AB AG IA: CPT | Performed by: OBSTETRICS & GYNECOLOGY

## 2022-05-11 PROCEDURE — 99999 PR PBB SHADOW E&M-EST. PATIENT-LVL II: ICD-10-PCS | Mod: PBBFAC,,, | Performed by: OBSTETRICS & GYNECOLOGY

## 2022-05-11 PROCEDURE — 82950 GLUCOSE TEST: CPT | Performed by: OBSTETRICS & GYNECOLOGY

## 2022-05-11 PROCEDURE — 99999 PR PBB SHADOW E&M-EST. PATIENT-LVL I: CPT | Mod: PBBFAC,,,

## 2022-05-11 PROCEDURE — 99999 PR PBB SHADOW E&M-EST. PATIENT-LVL I: ICD-10-PCS | Mod: PBBFAC,,,

## 2022-05-11 PROCEDURE — 99213 OFFICE O/P EST LOW 20 MIN: CPT | Mod: 25,S$PBB,TH, | Performed by: OBSTETRICS & GYNECOLOGY

## 2022-05-11 RX ORDER — SODIUM CHLORIDE, SODIUM LACTATE, POTASSIUM CHLORIDE, CALCIUM CHLORIDE 600; 310; 30; 20 MG/100ML; MG/100ML; MG/100ML; MG/100ML
INJECTION, SOLUTION INTRAVENOUS CONTINUOUS
Status: CANCELLED | OUTPATIENT
Start: 2022-05-11

## 2022-05-11 RX ORDER — FAMOTIDINE 10 MG/ML
20 INJECTION INTRAVENOUS
Status: CANCELLED | OUTPATIENT
Start: 2022-05-11

## 2022-05-11 RX ORDER — OXYTOCIN/RINGER'S LACTATE 30/500 ML
334 PLASTIC BAG, INJECTION (ML) INTRAVENOUS ONCE
Status: CANCELLED | OUTPATIENT
Start: 2022-05-11 | End: 2022-05-11

## 2022-05-11 RX ORDER — METHYLERGONOVINE MALEATE 0.2 MG/ML
200 INJECTION INTRAVENOUS ONCE AS NEEDED
Status: CANCELLED | OUTPATIENT
Start: 2022-05-11 | End: 2033-10-07

## 2022-05-11 RX ORDER — MISOPROSTOL 200 UG/1
800 TABLET ORAL ONCE AS NEEDED
Status: CANCELLED | OUTPATIENT
Start: 2022-05-11 | End: 2033-10-07

## 2022-05-11 RX ORDER — CEFAZOLIN SODIUM 2 G/50ML
2 SOLUTION INTRAVENOUS
Status: CANCELLED | OUTPATIENT
Start: 2022-05-11

## 2022-05-11 RX ORDER — MUPIROCIN 20 MG/G
OINTMENT TOPICAL
Status: CANCELLED | OUTPATIENT
Start: 2022-05-11

## 2022-05-11 RX ORDER — OXYTOCIN/RINGER'S LACTATE 30/500 ML
95 PLASTIC BAG, INJECTION (ML) INTRAVENOUS ONCE
Status: CANCELLED | OUTPATIENT
Start: 2022-05-11 | End: 2022-05-11

## 2022-05-11 NOTE — PROGRESS NOTES
No OB complaints today. Good fetal movement.   Growth scan on wnl on 3/7/2022.  Vertex. Closed.    33 yo now  female @ 38.2 wks by 7.3 wk sono (EDC 2022) who presents for f/u OB visit    1) SIUP (it's a girl)  -s/p official dating scan  -Rh positive   -mat 21 neg  -consents for rLTCS/blood signed 2022  -1 hr gtt wnl  -3rd trimester labs today    2) h/o previous LTCS: wants rLTCS    3) PP  S/p flu vaccine  Contraception: maybe pills  S/p tdphilippe amato MD

## 2022-05-11 NOTE — PROGRESS NOTES
Patient given TDAP in RIGHT deltoid. VIS given. Patient asked to wait 15 min in the lobby to monitor for adverse reaction. Patient verbalized understanding. Patient has received immunizations in the past without any complications.

## 2022-05-12 LAB — RPR SER QL: NORMAL

## 2022-05-16 ENCOUNTER — ANESTHESIA EVENT (OUTPATIENT)
Dept: OBSTETRICS AND GYNECOLOGY | Facility: HOSPITAL | Age: 32
End: 2022-05-16
Payer: MEDICAID

## 2022-05-16 ENCOUNTER — HOSPITAL ENCOUNTER (INPATIENT)
Facility: HOSPITAL | Age: 32
LOS: 2 days | Discharge: HOME | End: 2022-05-18
Attending: OBSTETRICS & GYNECOLOGY | Admitting: OBSTETRICS & GYNECOLOGY
Payer: MEDICAID

## 2022-05-16 ENCOUNTER — ANESTHESIA (OUTPATIENT)
Dept: OBSTETRICS AND GYNECOLOGY | Facility: HOSPITAL | Age: 32
End: 2022-05-16
Payer: MEDICAID

## 2022-05-16 VITALS — SYSTOLIC BLOOD PRESSURE: 86 MMHG | HEART RATE: 85 BPM | OXYGEN SATURATION: 100 % | DIASTOLIC BLOOD PRESSURE: 58 MMHG

## 2022-05-16 DIAGNOSIS — Z98.891 S/P CESAREAN SECTION: ICD-10-CM

## 2022-05-16 LAB
ABO + RH BLD: NORMAL
BASOPHILS # BLD AUTO: 0.02 K/UL (ref 0–0.2)
BASOPHILS NFR BLD: 0.3 % (ref 0–1.9)
BLD GP AB SCN CELLS X3 SERPL QL: NORMAL
DIFFERENTIAL METHOD: ABNORMAL
EOSINOPHIL # BLD AUTO: 0.1 K/UL (ref 0–0.5)
EOSINOPHIL NFR BLD: 1.2 % (ref 0–8)
ERYTHROCYTE [DISTWIDTH] IN BLOOD BY AUTOMATED COUNT: 14.6 % (ref 11.5–14.5)
HCT VFR BLD AUTO: 31.1 % (ref 37–48.5)
HGB BLD-MCNC: 10.2 G/DL (ref 12–16)
HIV1+2 IGG SERPL QL IA.RAPID: NORMAL
IMM GRANULOCYTES # BLD AUTO: 0.09 K/UL (ref 0–0.04)
IMM GRANULOCYTES NFR BLD AUTO: 1.3 % (ref 0–0.5)
LYMPHOCYTES # BLD AUTO: 1.4 K/UL (ref 1–4.8)
LYMPHOCYTES NFR BLD: 19.7 % (ref 18–48)
MCH RBC QN AUTO: 26.1 PG (ref 27–31)
MCHC RBC AUTO-ENTMCNC: 32.8 G/DL (ref 32–36)
MCV RBC AUTO: 80 FL (ref 82–98)
MONOCYTES # BLD AUTO: 0.8 K/UL (ref 0.3–1)
MONOCYTES NFR BLD: 11.1 % (ref 4–15)
NEUTROPHILS # BLD AUTO: 4.5 K/UL (ref 1.8–7.7)
NEUTROPHILS NFR BLD: 66.4 % (ref 38–73)
NRBC BLD-RTO: 0 /100 WBC
PLATELET # BLD AUTO: 202 K/UL (ref 150–450)
PLATELET BLD QL SMEAR: ABNORMAL
PMV BLD AUTO: 10.4 FL (ref 9.2–12.9)
RBC # BLD AUTO: 3.91 M/UL (ref 4–5.4)
WBC # BLD AUTO: 6.84 K/UL (ref 3.9–12.7)

## 2022-05-16 PROCEDURE — 36004724 HC OB OR TIME LEV III - 1ST 15 MIN: Performed by: OBSTETRICS & GYNECOLOGY

## 2022-05-16 PROCEDURE — 37000008 HC ANESTHESIA 1ST 15 MINUTES: Performed by: OBSTETRICS & GYNECOLOGY

## 2022-05-16 PROCEDURE — 63600175 PHARM REV CODE 636 W HCPCS: Performed by: STUDENT IN AN ORGANIZED HEALTH CARE EDUCATION/TRAINING PROGRAM

## 2022-05-16 PROCEDURE — 25000003 PHARM REV CODE 250: Performed by: OBSTETRICS & GYNECOLOGY

## 2022-05-16 PROCEDURE — 25000003 PHARM REV CODE 250: Performed by: STUDENT IN AN ORGANIZED HEALTH CARE EDUCATION/TRAINING PROGRAM

## 2022-05-16 PROCEDURE — 71000039 HC RECOVERY, EACH ADD'L HOUR: Performed by: OBSTETRICS & GYNECOLOGY

## 2022-05-16 PROCEDURE — 59514 CESAREAN DELIVERY ONLY: CPT | Mod: GB,,, | Performed by: OBSTETRICS & GYNECOLOGY

## 2022-05-16 PROCEDURE — 71000033 HC RECOVERY, INTIAL HOUR: Performed by: OBSTETRICS & GYNECOLOGY

## 2022-05-16 PROCEDURE — 62322 NJX INTERLAMINAR LMBR/SAC: CPT | Performed by: STUDENT IN AN ORGANIZED HEALTH CARE EDUCATION/TRAINING PROGRAM

## 2022-05-16 PROCEDURE — 86900 BLOOD TYPING SEROLOGIC ABO: CPT | Performed by: OBSTETRICS & GYNECOLOGY

## 2022-05-16 PROCEDURE — 85025 COMPLETE CBC W/AUTO DIFF WBC: CPT | Performed by: OBSTETRICS & GYNECOLOGY

## 2022-05-16 PROCEDURE — 37000009 HC ANESTHESIA EA ADD 15 MINS: Performed by: OBSTETRICS & GYNECOLOGY

## 2022-05-16 PROCEDURE — 36004725 HC OB OR TIME LEV III - EA ADD 15 MIN: Performed by: OBSTETRICS & GYNECOLOGY

## 2022-05-16 PROCEDURE — 59514 PR CESAREAN DELIVERY ONLY: ICD-10-PCS | Mod: GB,,, | Performed by: OBSTETRICS & GYNECOLOGY

## 2022-05-16 PROCEDURE — 11000001 HC ACUTE MED/SURG PRIVATE ROOM

## 2022-05-16 PROCEDURE — 63600175 PHARM REV CODE 636 W HCPCS: Performed by: OBSTETRICS & GYNECOLOGY

## 2022-05-16 PROCEDURE — 51702 INSERT TEMP BLADDER CATH: CPT

## 2022-05-16 PROCEDURE — 86703 HIV-1/HIV-2 1 RESULT ANTBDY: CPT | Performed by: OBSTETRICS & GYNECOLOGY

## 2022-05-16 PROCEDURE — 86850 RBC ANTIBODY SCREEN: CPT | Performed by: OBSTETRICS & GYNECOLOGY

## 2022-05-16 RX ORDER — CEFAZOLIN SODIUM 2 G/50ML
2 SOLUTION INTRAVENOUS
Status: COMPLETED | OUTPATIENT
Start: 2022-05-16 | End: 2022-05-16

## 2022-05-16 RX ORDER — OXYTOCIN/RINGER'S LACTATE 30/500 ML
334 PLASTIC BAG, INJECTION (ML) INTRAVENOUS ONCE
Status: DISCONTINUED | OUTPATIENT
Start: 2022-05-16 | End: 2022-05-16

## 2022-05-16 RX ORDER — PRENATAL WITH FERROUS FUM AND FOLIC ACID 3080; 920; 120; 400; 22; 1.84; 3; 20; 10; 1; 12; 200; 27; 25; 2 [IU]/1; [IU]/1; MG/1; [IU]/1; MG/1; MG/1; MG/1; MG/1; MG/1; MG/1; UG/1; MG/1; MG/1; MG/1; MG/1
1 TABLET ORAL DAILY
Status: DISCONTINUED | OUTPATIENT
Start: 2022-05-16 | End: 2022-05-18 | Stop reason: HOSPADM

## 2022-05-16 RX ORDER — ACETAMINOPHEN 325 MG/1
650 TABLET ORAL EVERY 6 HOURS
Status: DISPENSED | OUTPATIENT
Start: 2022-05-16 | End: 2022-05-17

## 2022-05-16 RX ORDER — SODIUM CHLORIDE 0.9 % (FLUSH) 0.9 %
10 SYRINGE (ML) INJECTION
Status: DISCONTINUED | OUTPATIENT
Start: 2022-05-16 | End: 2022-05-18 | Stop reason: HOSPADM

## 2022-05-16 RX ORDER — ONDANSETRON 2 MG/ML
4 INJECTION INTRAMUSCULAR; INTRAVENOUS EVERY 6 HOURS PRN
Status: ACTIVE | OUTPATIENT
Start: 2022-05-16 | End: 2022-05-17

## 2022-05-16 RX ORDER — ONDANSETRON 8 MG/1
8 TABLET, ORALLY DISINTEGRATING ORAL EVERY 8 HOURS PRN
Status: DISCONTINUED | OUTPATIENT
Start: 2022-05-16 | End: 2022-05-18 | Stop reason: HOSPADM

## 2022-05-16 RX ORDER — SODIUM CITRATE AND CITRIC ACID MONOHYDRATE 334; 500 MG/5ML; MG/5ML
30 SOLUTION ORAL ONCE
Status: COMPLETED | OUTPATIENT
Start: 2022-05-16 | End: 2022-05-16

## 2022-05-16 RX ORDER — OXYTOCIN/RINGER'S LACTATE 30/500 ML
95 PLASTIC BAG, INJECTION (ML) INTRAVENOUS ONCE
Status: DISCONTINUED | OUTPATIENT
Start: 2022-05-16 | End: 2022-05-16

## 2022-05-16 RX ORDER — METHYLERGONOVINE MALEATE 0.2 MG/ML
200 INJECTION INTRAVENOUS ONCE AS NEEDED
Status: DISCONTINUED | OUTPATIENT
Start: 2022-05-16 | End: 2022-05-16

## 2022-05-16 RX ORDER — IBUPROFEN 600 MG/1
600 TABLET ORAL EVERY 6 HOURS
Status: DISCONTINUED | OUTPATIENT
Start: 2022-05-17 | End: 2022-05-18 | Stop reason: HOSPADM

## 2022-05-16 RX ORDER — OXYCODONE AND ACETAMINOPHEN 5; 325 MG/1; MG/1
1 TABLET ORAL EVERY 4 HOURS PRN
Status: DISCONTINUED | OUTPATIENT
Start: 2022-05-17 | End: 2022-05-18 | Stop reason: HOSPADM

## 2022-05-16 RX ORDER — MUPIROCIN 20 MG/G
OINTMENT TOPICAL 2 TIMES DAILY
Status: DISCONTINUED | OUTPATIENT
Start: 2022-05-16 | End: 2022-05-18 | Stop reason: HOSPADM

## 2022-05-16 RX ORDER — SODIUM CHLORIDE, SODIUM LACTATE, POTASSIUM CHLORIDE, CALCIUM CHLORIDE 600; 310; 30; 20 MG/100ML; MG/100ML; MG/100ML; MG/100ML
INJECTION, SOLUTION INTRAVENOUS CONTINUOUS PRN
Status: DISCONTINUED | OUTPATIENT
Start: 2022-05-16 | End: 2022-05-16

## 2022-05-16 RX ORDER — FAMOTIDINE 10 MG/ML
20 INJECTION INTRAVENOUS
Status: DISCONTINUED | OUTPATIENT
Start: 2022-05-16 | End: 2022-05-16

## 2022-05-16 RX ORDER — SODIUM CHLORIDE, SODIUM LACTATE, POTASSIUM CHLORIDE, CALCIUM CHLORIDE 600; 310; 30; 20 MG/100ML; MG/100ML; MG/100ML; MG/100ML
INJECTION, SOLUTION INTRAVENOUS CONTINUOUS
Status: DISCONTINUED | OUTPATIENT
Start: 2022-05-16 | End: 2022-05-16

## 2022-05-16 RX ORDER — PHENYLEPHRINE HYDROCHLORIDE 10 MG/ML
INJECTION INTRAVENOUS
Status: DISCONTINUED | OUTPATIENT
Start: 2022-05-16 | End: 2022-05-16

## 2022-05-16 RX ORDER — DIPHENHYDRAMINE HCL 25 MG
25 CAPSULE ORAL EVERY 4 HOURS PRN
Status: DISCONTINUED | OUTPATIENT
Start: 2022-05-16 | End: 2022-05-18 | Stop reason: HOSPADM

## 2022-05-16 RX ORDER — BUPIVACAINE HYDROCHLORIDE 2.5 MG/ML
30 INJECTION, SOLUTION EPIDURAL; INFILTRATION; INTRACAUDAL ONCE AS NEEDED
Status: COMPLETED | OUTPATIENT
Start: 2022-05-16 | End: 2022-05-16

## 2022-05-16 RX ORDER — OXYCODONE HYDROCHLORIDE 5 MG/1
10 TABLET ORAL EVERY 4 HOURS PRN
Status: DISPENSED | OUTPATIENT
Start: 2022-05-16 | End: 2022-05-17

## 2022-05-16 RX ORDER — FENTANYL CITRATE 50 UG/ML
INJECTION, SOLUTION INTRAMUSCULAR; INTRAVENOUS
Status: DISCONTINUED | OUTPATIENT
Start: 2022-05-16 | End: 2022-05-16

## 2022-05-16 RX ORDER — MUPIROCIN 20 MG/G
OINTMENT TOPICAL
Status: DISCONTINUED | OUTPATIENT
Start: 2022-05-16 | End: 2022-05-16

## 2022-05-16 RX ORDER — BISACODYL 10 MG
10 SUPPOSITORY, RECTAL RECTAL ONCE AS NEEDED
Status: DISCONTINUED | OUTPATIENT
Start: 2022-05-16 | End: 2022-05-18 | Stop reason: HOSPADM

## 2022-05-16 RX ORDER — OXYCODONE HYDROCHLORIDE 5 MG/1
5 TABLET ORAL EVERY 4 HOURS PRN
Status: DISPENSED | OUTPATIENT
Start: 2022-05-16 | End: 2022-05-17

## 2022-05-16 RX ORDER — DOCUSATE SODIUM 100 MG/1
200 CAPSULE, LIQUID FILLED ORAL 2 TIMES DAILY
Status: DISCONTINUED | OUTPATIENT
Start: 2022-05-16 | End: 2022-05-18 | Stop reason: HOSPADM

## 2022-05-16 RX ORDER — PROCHLORPERAZINE EDISYLATE 5 MG/ML
5 INJECTION INTRAMUSCULAR; INTRAVENOUS EVERY 6 HOURS PRN
Status: DISCONTINUED | OUTPATIENT
Start: 2022-05-16 | End: 2022-05-18 | Stop reason: HOSPADM

## 2022-05-16 RX ORDER — BUPIVACAINE HYDROCHLORIDE 7.5 MG/ML
INJECTION, SOLUTION INTRASPINAL
Status: DISCONTINUED | OUTPATIENT
Start: 2022-05-16 | End: 2022-05-16

## 2022-05-16 RX ORDER — ACETAMINOPHEN 325 MG/1
650 TABLET ORAL EVERY 6 HOURS PRN
Status: DISCONTINUED | OUTPATIENT
Start: 2022-05-16 | End: 2022-05-18 | Stop reason: HOSPADM

## 2022-05-16 RX ORDER — OXYTOCIN/RINGER'S LACTATE 30/500 ML
PLASTIC BAG, INJECTION (ML) INTRAVENOUS CONTINUOUS PRN
Status: DISCONTINUED | OUTPATIENT
Start: 2022-05-16 | End: 2022-05-16

## 2022-05-16 RX ORDER — ONDANSETRON 2 MG/ML
INJECTION INTRAMUSCULAR; INTRAVENOUS
Status: DISCONTINUED | OUTPATIENT
Start: 2022-05-16 | End: 2022-05-16

## 2022-05-16 RX ORDER — OXYCODONE AND ACETAMINOPHEN 10; 325 MG/1; MG/1
1 TABLET ORAL EVERY 4 HOURS PRN
Status: DISCONTINUED | OUTPATIENT
Start: 2022-05-17 | End: 2022-05-18 | Stop reason: HOSPADM

## 2022-05-16 RX ORDER — KETOROLAC TROMETHAMINE 30 MG/ML
30 INJECTION, SOLUTION INTRAMUSCULAR; INTRAVENOUS EVERY 6 HOURS
Status: COMPLETED | OUTPATIENT
Start: 2022-05-16 | End: 2022-05-17

## 2022-05-16 RX ORDER — ADHESIVE BANDAGE
30 BANDAGE TOPICAL 2 TIMES DAILY PRN
Status: DISCONTINUED | OUTPATIENT
Start: 2022-05-17 | End: 2022-05-18 | Stop reason: HOSPADM

## 2022-05-16 RX ORDER — METHYLERGONOVINE MALEATE 0.2 MG/1
0.2 TABLET ORAL ONCE
Status: COMPLETED | OUTPATIENT
Start: 2022-05-16 | End: 2022-05-16

## 2022-05-16 RX ORDER — OXYTOCIN/RINGER'S LACTATE 30/500 ML
95 PLASTIC BAG, INJECTION (ML) INTRAVENOUS ONCE
Status: DISCONTINUED | OUTPATIENT
Start: 2022-05-16 | End: 2022-05-18 | Stop reason: HOSPADM

## 2022-05-16 RX ORDER — MORPHINE SULFATE 1 MG/ML
INJECTION, SOLUTION EPIDURAL; INTRATHECAL; INTRAVENOUS
Status: DISCONTINUED | OUTPATIENT
Start: 2022-05-16 | End: 2022-05-16

## 2022-05-16 RX ORDER — MISOPROSTOL 200 UG/1
800 TABLET ORAL ONCE AS NEEDED
Status: DISCONTINUED | OUTPATIENT
Start: 2022-05-16 | End: 2022-05-16

## 2022-05-16 RX ORDER — MISOPROSTOL 200 UG/1
200 TABLET ORAL ONCE AS NEEDED
Status: DISCONTINUED | OUTPATIENT
Start: 2022-05-16 | End: 2022-05-18 | Stop reason: HOSPADM

## 2022-05-16 RX ORDER — SIMETHICONE 80 MG
1 TABLET,CHEWABLE ORAL EVERY 6 HOURS PRN
Status: DISCONTINUED | OUTPATIENT
Start: 2022-05-16 | End: 2022-05-18 | Stop reason: HOSPADM

## 2022-05-16 RX ADMIN — OXYCODONE 10 MG: 5 TABLET ORAL at 06:05

## 2022-05-16 RX ADMIN — FAMOTIDINE 20 MG: 10 INJECTION, SOLUTION INTRAVENOUS at 10:05

## 2022-05-16 RX ADMIN — ONDANSETRON 4 MG: 2 INJECTION, SOLUTION INTRAMUSCULAR; INTRAVENOUS at 11:05

## 2022-05-16 RX ADMIN — METHYLERGONOVINE 0.2 MG: 0.2 TABLET ORAL at 02:05

## 2022-05-16 RX ADMIN — OXYCODONE 10 MG: 5 TABLET ORAL at 11:05

## 2022-05-16 RX ADMIN — SODIUM CITRATE AND CITRIC ACID MONOHYDRATE 30 ML: 500; 334 SOLUTION ORAL at 10:05

## 2022-05-16 RX ADMIN — SODIUM CHLORIDE, SODIUM LACTATE, POTASSIUM CHLORIDE, AND CALCIUM CHLORIDE: .6; .31; .03; .02 INJECTION, SOLUTION INTRAVENOUS at 11:05

## 2022-05-16 RX ADMIN — CEFAZOLIN SODIUM 2 G: 2 SOLUTION INTRAVENOUS at 10:05

## 2022-05-16 RX ADMIN — FENTANYL CITRATE 15 MCG: 50 INJECTION, SOLUTION INTRAMUSCULAR; INTRAVENOUS at 11:05

## 2022-05-16 RX ADMIN — MORPHINE SULFATE 0.15 MG: 1 INJECTION, SOLUTION EPIDURAL; INTRATHECAL; INTRAVENOUS at 11:05

## 2022-05-16 RX ADMIN — DOCUSATE SODIUM 200 MG: 100 CAPSULE, LIQUID FILLED ORAL at 08:05

## 2022-05-16 RX ADMIN — PHENYLEPHRINE HYDROCHLORIDE 100 MCG: 10 INJECTION INTRAVENOUS at 11:05

## 2022-05-16 RX ADMIN — BUPIVACAINE HYDROCHLORIDE 50 MG: 2.5 INJECTION, SOLUTION EPIDURAL; INFILTRATION; INTRACAUDAL; PERINEURAL at 11:05

## 2022-05-16 RX ADMIN — KETOROLAC TROMETHAMINE 30 MG: 30 INJECTION, SOLUTION INTRAMUSCULAR at 12:05

## 2022-05-16 RX ADMIN — Medication 334 ML/HR: at 11:05

## 2022-05-16 RX ADMIN — ACETAMINOPHEN 650 MG: 325 TABLET ORAL at 08:05

## 2022-05-16 RX ADMIN — ACETAMINOPHEN 650 MG: 325 TABLET ORAL at 02:05

## 2022-05-16 RX ADMIN — SODIUM CHLORIDE, SODIUM LACTATE, POTASSIUM CHLORIDE, AND CALCIUM CHLORIDE 1000 ML: .6; .31; .03; .02 INJECTION, SOLUTION INTRAVENOUS at 09:05

## 2022-05-16 RX ADMIN — KETOROLAC TROMETHAMINE 30 MG: 30 INJECTION, SOLUTION INTRAMUSCULAR at 06:05

## 2022-05-16 RX ADMIN — PHENYLEPHRINE HYDROCHLORIDE 100 MCG: 10 INJECTION INTRAVENOUS at 12:05

## 2022-05-16 RX ADMIN — PHENYLEPHRINE HYDROCHLORIDE 50 MCG/MIN: 10 INJECTION INTRAVENOUS at 11:05

## 2022-05-16 RX ADMIN — MUPIROCIN: 20 OINTMENT TOPICAL at 09:05

## 2022-05-16 RX ADMIN — BUPIVACAINE HYDROCHLORIDE 1.5 ML: 7.5 INJECTION, SOLUTION SUBARACHNOID at 11:05

## 2022-05-16 RX ADMIN — KETOROLAC TROMETHAMINE 30 MG: 30 INJECTION, SOLUTION INTRAMUSCULAR at 11:05

## 2022-05-16 NOTE — TRANSFER OF CARE
Anesthesia Transfer of Care Note    Patient: Demario Christine    Procedure(s) Performed: Procedure(s) (LRB):  DELIVERY-CEASAREAN SECTION (N/A)    Patient location: Labor and Delivery    Anesthesia Type: spinal    Transport from OR: Transported from OR on room air with adequate spontaneous ventilation    Post pain: adequate analgesia    Post assessment: no apparent anesthetic complications    Post vital signs: stable    Level of consciousness: awake, alert and oriented    Nausea/Vomiting: no nausea/vomiting    Complications: none    Transfer of care protocol was followed      Last vitals:   Visit Vitals  /74   Pulse 86   Resp 18   LMP 08/08/2021   SpO2 100%   Breastfeeding No

## 2022-05-16 NOTE — ANESTHESIA PROCEDURE NOTES
Spinal    Diagnosis: C section  Patient location during procedure: OB  Start time: 5/16/2022 11:05 AM  Timeout: 5/16/2022 11:00 AM  End time: 5/16/2022 11:08 AM    Staffing  Authorizing Provider: Errol Guerra MD  Performing Provider: Driss Block MD    Preanesthetic Checklist  Completed: patient identified, IV checked, site marked, risks and benefits discussed, surgical consent, monitors and equipment checked, pre-op evaluation and timeout performed  Spinal Block  Patient position: sitting  Prep: ChloraPrep  Patient monitoring: heart rate, continuous pulse ox and frequent blood pressure checks  Approach: midline  Location: L2-3  Injection technique: single shot  CSF Fluid: clear free-flowing CSF  Needle  Needle type: pencil-tip   Needle gauge: 25 G  Needle length: 5 in  Additional Documentation: incremental injection, negative aspiration for heme and no paresthesia on injection  Needle localization: anatomical landmarks  Assessment  Sensory level: T10   Dermatomal levels determined by pinch or prick  Ease of block: moderate  Patient's tolerance of the procedure: comfortable throughout block

## 2022-05-16 NOTE — DISCHARGE INSTRUCTIONS
"Patient Discharge Instructions for Postpartum Women    Resume Regular Diet  Increase activity gradually, no heavy lifting  Shower  No tampons, douching or sexual intercourse.  Discuss birth control options with your physician.  Wear a support bra  Return to work/school when you've been cleared by a physician    Call your physician if     *Fever of 100.4 or higher  *Persistent nausea/ vomiting  *Incisional drainage  *Heavy vaginal bleeding or large clots (Heavy bleeding is soaking 1 pad in an hour)  *Swelling and pain in arms or legs  *Severe headaches, blurred vision or fainting  *Shortness of breath  *Frequency and burning with urination  *Signs of postpartum depression, discuss these signs with your physician    Call lactation services for questions regarding feeding, nipple and breast care, and general questions about lactation.  They can be reached at [phone]         Understanding Postpartum Depression    You've just had a baby.  You know you should be excited and happy.  But instead you find yourself crying for no reason.  You may have trouble coping with your daily tasks.  You feel sad, tired, and hopeless most of the time.  You may even feel ashamed or guilty.  But what you're going through is not your fault and you can feel better.  Talk to your doctor.  He or she can help.    Depression After Childbirth    You may be weepy and tired right after giving birth.  These feelings are normal.  They're sometimes called the "baby blues."  These blues go away 2-3 weeks.  However, postpartum (meaning "after birth") depression lasts much longer and is more sever than the "baby blues."  It can make you feel sad and hopeless.  You may also fear that your baby will be harmed and worry about being a bad mother.      What is Depression?    Depression is a mood disorder that affects the way you think and feel.  The most common symptom is a feeling of deep sadness.  You may also feel as if you just can't cope with life.  "   Other symptoms include:      * Gaining or loosing weight  * Sleeping too much or too little  * Feeling tired all the time  * Feeling restless  * Fears of harming your baby   * Lack of interest in your baby  * Feeling worthless or guilty  * No longer finding pleasure in things you used to  * Having trouble thinking clearly or making decisions  * Thoughts of hurting yourself or your baby    What Causes Postpartum Depression    The exact causes of postpartum depression isn't known.  It may be due to changes in your hormones during and after childbirth.  You may also be tired from caring for your baby and adjusting to being a mother.  All these factors may make you feel depressed.  In some cases, your genes may also play a role.    Depression Can Be Treated    The good news is that there are many ways to treat postpartum depression.  Talking to your doctor is the first step toward feeling better.    Resources:    * National [place] of Mental Health  -- 953.740.1734    www.nimh.nih.gov    * National [place] on Mental Illness --464.120.2601    Www.junior.org    * Mental Health [place] -- 319.419.2835     Www.[facility].org    * National Suicide Hotline --209.691.2717 (800-SUICIDE)    8805-2318 The "[employer]"  All rights reserved.  This information is not intended as a substitute for professional medical care.  Always follow up with your healthcare professional's instructions.    Breastfeeding Discharge Instructions      Feed the baby at the earliest sign of hunger or comfort  Hands to mouth, sucking motions  Rooting or searching for something to suck on  Dont wait for crying - it is a sign of distress    The feedings may be 8-12 times per 24hrs and will not follow a schedule  Avoid pacifiers and bottles for the first 4 weeks  Alternate the breast you start the feeding with, or start with the breast that feels the fullest  Switch breasts when the baby takes himself off the breast or falls asleep  Keep offering  breasts until the baby looks full, no longer gives hunger signs, and stays asleep when placed on his back in the crib  If the baby is sleepy and wont wake for a feeding, put the baby skin-to-skin dressed in a diaper against the mothers bare chest  Sleep near your baby  The baby should be positioned and latched on to the breast correctly  Chest-to-chest, chin in the breast  Babys lips are flipped outward  Babys mouth is stretched open wide like a shout  Babys sucking should feel like tugging to the mother  The baby should be drinking at the breast:  You should hear swallowing or gulping throughout the feeding  You should see milk on the babys lips when he comes off the breast  Your breasts should be softer when the baby is finished feeding  The baby should look relaxed at the end of feedings  After the 4th day and your milk is in:  The babys poop should turn bright yellow and be loose, watery, and seedy  The baby should have at least 3-4 poops the size of the palm of your hand per day  The baby should have at least 5-6 wet diapers per day  The urine should be light yellow in color  You should drink when you are thirsty and eat a healthy diet when you are    hungry.     Take naps to get the rest you need.   Take medications and/or drink alcohol only with permission of your obstetrician    or the babys pediatrician.  You can also call the [facility]ant Risk Center,   ([phone]), Monday-Friday, 8am-5pm Central time, to get the most   up-to-date evidence-based information on the use of medications during   pregnancy and breastfeeding.      The baby should be examined by a pediatrician at 3-5 days of age.  Once your   milk comes in, the baby should be gaining at least ½ - 1oz each day and should be back to birthweight no later than 10-14 days of age.          Community Resources    [facility] Breastfeeding Warmline: 757.931.5112  Local [facility] clinics: provide incentives and breastpumps to eligible  mothers  La Leche League International (LLLI):  mother-to-mother support group website        www.lll.org  Local La Leche League mother-to-mother support groups:        www.lljuan manuelBiz360.PhotoShelter        La Leche League [facility]   Dr. Fransisco Loja website for latch videos and general information:        www.breastfeedinginc.ca  [facility] Risk [facility] is a call center that provides information about the safety of taking medications while breastfeeding.  Call 1-581.494.7998, M-F, 8am-5pm, [place].  International Lactation Consultant Association provides resources for assistance:        www.ilca.org  [facility] Breastfeeding Coalition provides informationand resources for parents  and the community    http://ChristianaCareastfeeding.org  Zip code search of breastfeeding resources and more:                                                                              www.[place]BreastfeedingSupport.org          Patti Brown is a mom-to-mom support group:                             www.nolanesting.com//breastfeedng-support/  Partners for Healthy Babies:  5-379-002-BABY(7943)  [place] [employer]: a breastfeeding support group for women of color, 361.598.7823

## 2022-05-16 NOTE — H&P
HPI:   Demario Christine is a 32 y.o. female  at 39 wks EGA who presents here for rLTCS.    ROS:  GENERAL: Denies weight gain or weight loss. Feeling well overall.   SKIN: Denies rash or lesions.   HEAD: Denies head injury or headache.   CHEST: Denies chest pain or shortness of breath.   CARDIOVASCULAR: Denies palpitations or left sided chest pain.   ABDOMEN: No abdominal pain, constipation, diarrhea, nausea, vomiting or rectal bleeding.   URINARY: No frequency, dysuria, hematuria, or burning on urination.  REPRODUCTIVE: See HPI.     Past Medical History:   Diagnosis Date    Chronic diarrhea     Migraine headache      Past Surgical History:   Procedure Laterality Date     SECTION N/A 2020    Procedure:  SECTION;  Surgeon: Rae Jimenez MD;  Location: [facility] L&D;  Service: OB/GYN;  Laterality: N/A;    COLONOSCOPY N/A 5/15/2017    Procedure: COLONOSCOPY;  Surgeon: Errol Hernandez MD;  Location: [address];  Service: Endoscopy;  Laterality: N/A;     Family History   Problem Relation Age of Onset    Hypertension Mother     No Known Problems Father     No Known Problems Sister     No Known Problems Brother     No Known Problems Maternal Aunt     No Known Problems Maternal Uncle     No Known Problems Paternal Aunt     No Known Problems Paternal Uncle     Cataracts Maternal Grandmother     No Known Problems Maternal Grandfather     No Known Problems Paternal Grandmother     No Known Problems Paternal Grandfather     Blindness Neg Hx     Cancer Neg Hx     Diabetes Neg Hx     Glaucoma Neg Hx     Inflammatory bowel disease Neg Hx     Stomach cancer Neg Hx     Colon cancer Neg Hx     Rectal cancer Neg Hx     Esophageal cancer Neg Hx     Crohn's disease Neg Hx     Celiac disease Neg Hx     Amblyopia Neg Hx     Macular degeneration Neg Hx     Retinal detachment Neg Hx     Strabismus Neg Hx     Stroke Neg Hx     Thyroid disease Neg Hx      Allergies: Patient has no known allergies.        PE:   /71   Pulse 97   Resp 18   LMP 08/08/2021   SpO2 100%   APPEARANCE: Well nourished, well developed, in no acute distress.  CHEST: Lungs clear to auscultation.  ABDOMEN:  Gravid.   SVE: deferred  FHT: 130bpm, mod kj, +accels, no decels  [place]: irregular    Lab Results   Component Value Date    WBC 6.84 05/16/2022    HGB 10.2 (L) 05/16/2022    HCT 31.1 (L) 05/16/2022    MCV 80 (L) 05/16/2022     05/11/2022     05/11/2022       B POS    Assessment:  IUP @ 39 weeks,  rLTCS    Plan:   Admit to L&D  FHT Cat 1  GBS neg  Plan: will proceed to OR for surgery  Consents reviewed    SHERWIN Jimenez MD

## 2022-05-16 NOTE — LACTATION NOTE
[facility] - Mother [facility] Baby  Lactation Note - Mom    SUMMARY     Maternal Assessment    Breast Size Issue: none  Breast Density: Bilateral:, soft  Nipples: Bilateral:, graspable (per pt)  Left Nipple Symptoms: other (see comments) (denies pain)  Right Nipple Symptoms: other (see comments) (denies pain)      LATCH Score         Breasts WDL    Breast WDL: WDL (per pt)  Left Nipple Symptoms: other (see comments) (denies pain)  Right Nipple Symptoms: other (see comments) (denies pain)    Maternal Infant Feeding    Maternal Preparation: breast care  Maternal Emotional State: independent, relaxed  Pain with Feeding: no  Comfort Measures Following Feeding: air-drying encouraged  Latch Assistance: other (see comments) (offered, encouraged to call for latch check/assistance)    Lactation Referrals    Lactation Referrals: outpatient lactation program, pediatric care provider  Outpatient Lactation Program Lactation Follow-up Date/Time: call lact ctr PRN  Pediatric Care Provider Lactation Follow-up Date/Time: f/u in 2-3 days after discharge or sooner as directed by NNP    Lactation Interventions    Breast Care: Breastfeeding:  (enc hand expression and air drying)  Breastfeeding Assistance: support offered, feeding cue recognition promoted, feeding on demand promoted  Breast Care: Breastfeeding:  (enc hand expression and air drying)  Breastfeeding Assistance: support offered, feeding cue recognition promoted, feeding on demand promoted  Fetal Wellbeing Promotion: intake and output monitored  Breastfeeding Support: encouragement provided, maternal rest encouraged       Breastfeeding Session    Breast Pumping Interventions: other (see comments) (disucssed indications for when to start pumping)    Maternal Information

## 2022-05-16 NOTE — ANESTHESIA PREPROCEDURE EVALUATION
2022  Demario Christine is a 32 y.o., female  here for repeat LTCS.       Review of patient's allergies indicates:  No Known Allergies    Past Medical History:   Diagnosis Date    Chronic diarrhea     Migraine headache        Past Surgical History:   Procedure Laterality Date     SECTION N/A 2020    Procedure:  SECTION;  Surgeon: Rae Jimenez MD;  Location: [facility] L&D;  Service: OB/GYN;  Laterality: N/A;    COLONOSCOPY N/A 5/15/2017    Procedure: COLONOSCOPY;  Surgeon: Errol Hernandez MD;  Location: [address]);  Service: Endoscopy;  Laterality: N/A;       Outpatient Meds  No current facility-administered medications on file prior to encounter.     Current Outpatient Medications on File Prior to Encounter   Medication Sig Dispense Refill    ondansetron (ZOFRAN-ODT) 4 MG TbDL Take 1 tablet (4 mg total) by mouth every 8 (eight) hours as needed (nausea). (Patient not taking: No sig reported) 30 tablet 2    ondansetron (ZOFRAN-ODT) 4 MG TbDL Take 1 tablet (4 mg total) by mouth every 8 (eight) hours as needed (nausea). (Patient not taking: No sig reported) 30 tablet 2    prenatal vit/iron fum/folic ac (PRENATAL 1+1 ORAL) Take by mouth.         Inpatient Scheduled Meds   oxytocin in lactated ringers  334 kelley-units/min Intravenous Once    oxytocin in lactated ringers  95 kelley-units/min Intravenous Once       Trended Lab Data:  Recent Labs   Lab 22  1139   WBC 8.82  8.82   HGB 9.7*  9.7*   HCT 29.1*  29.1*     237   MCV 79*  79*   RDW 14.5  14.5       Coags:   No results found for: INR, PROTIME, PTT        Pre-op Assessment    I have reviewed the Patient Summary Reports.     I have reviewed the Nursing Notes. I have reviewed the NPO Status.   I have reviewed the Medications.     Review of Systems  Anesthesia Hx:  No problems with previous  Anesthesia   Denies Personal Hx of Anesthesia complications.   Social:  Former Smoker, No Alcohol Use    Hematology/Oncology:  Hematology Normal   Oncology Normal     EENT/Dental:EENT/Dental Normal   Cardiovascular:  Cardiovascular Normal     Pulmonary:  Pulmonary Normal    Renal/:  Renal/ Normal     Hepatic/GI:  Hepatic/GI Normal    Musculoskeletal:  Musculoskeletal Normal    Neurological:  Neurology Normal    Endocrine:  Endocrine Normal    Dermatological:  Skin Normal    Psych:  Psychiatric Normal           Physical Exam  General: Cooperative, Alert and Oriented    Airway:  Mallampati: I   Mouth Opening: Normal  TM Distance: Normal  Tongue: Normal  Neck ROM: Normal ROM    Dental:  Intact        Anesthesia Plan  Type of Anesthesia, risks & benefits discussed:    Anesthesia Type: Spinal, Epidural, Gen ETT  Intra-op Monitoring Plan: Standard ASA Monitors  Informed Consent: Informed consent signed with the Patient and all parties understand the risks and agree with anesthesia plan.  All questions answered.   ASA Score: 2    Ready For Surgery From Anesthesia Perspective.     .

## 2022-05-16 NOTE — L&D DELIVERY NOTE
Operative Note    Date: 2022  Preoperative Diagnosis:  1) IUP @ 39wks, h/o previous LTCS  2) wants rLTCS    Postoperative Diagnosis: same  Procedure: rLTCS  Type of Anesthesia: spinal  Surgeon: Rae amato  Assistant Surgeon: maya johnson  Specimen/Tissue Removed: intact 3 vessel cord placenta  Estimated Blood Loss: 550cc  Urine Output: 420cc clear  IV Fluids: 1000cc LR  Complications: none  Findings: viable female infant in cephalic presentation with APGARS pending; Normal appearing tubes and ovaries bilaterally.     TECHNIQUE:  The patient was taken out to the Operating Room where her spinal was found to be adequate.  She was then prepared and draped and in a normal sterile fashion.  Previous keloid scar removed at skin incision.   Incision was started with the scalpel then taken down to underlying layer of adipose tissue to the fascia with the scalpel.  The fascia was incised in the midline then extended superiorly and laterally with the pickups and curved mayos. Superior aspect of the incision was grasped with Kocher clamps, elevated and the rectus muscles were dissected off the fascia using curved Mayos.  Similar technique was achieved in the lower abdominal incision.  The rectus muscle was then  in the midline.     Bladder blade was placed.  The vesicular peritoneum was identified and entered with pickups and entered sharply with Metzenbaums.    Bladder flap was then created digitally.  Bladder blade was replaced.  A scalpel was used to make a low transverse incision in the lower uterine segment.     The  was delivered using standard technique.  Cord was clamped and  was handed to awaiting nurse.  Cord blood collected.    The placenta were then delivered with gentle manual traction.    The uterus was exteriorized and cleared of all clots and debris.  The uterus was   closed with 0 Vicryl suture in a running locked fashion.   Hemostasis was noted.  Posterior   cul-de-sac of  Sharan was irrigated of all clots and debris.  Uterus was then   returned to the abdomen.  The gutters were cleared of all clots and debris.    The peritoneum and rectus muscles were then reapproximated using 2-0 Vicryl suture.  The fascia was then   closed with 0 Vicryl suture in a running fashion.  Subcutaneous tissue was then closed with 2-0 Vicryl suture in a running fashion.    Bupivacaine was injected for local anesthesia. The skin was then closed with 4-0 Monocryl suture in a running fashion.     The patient tolerated the procedure well.  Sponge, lap and needle counts were   correct x3.  The patient received 2 g of Ancef IV prior to starting the surgery.  She was taken to the PACU in stable condition.       Rae Jimenez MD

## 2022-05-16 NOTE — NURSING
3rd trimester HIV not resulted from 5/11/22.  Lab stated was a send out.  Order placed for Rapid HIV to be added on to labs drawn this am.

## 2022-05-16 NOTE — PLAN OF CARE
Pt is a , previous c/s x 1 She is 39 weeks today , no significant medical history.  VS stable, pt showered pre op education given. Preop checklist complete.  NST reactivE AND REASSURING

## 2022-05-16 NOTE — PLAN OF CARE
VSS. NAD noted. Pt reports pain goal met with prescribed medications per MD.  Currently on bedrest until rodriguez to be d/c'd per MD orders. Tolerating regular diet. Bonding with baby. Smiles appropriately at baby. Spouse support noted at bedside.  Remains free from falls and injury. POC reviewed with patient. Pt verbalized understanding.

## 2022-05-17 DIAGNOSIS — G89.18 POSTOPERATIVE PAIN: Primary | ICD-10-CM

## 2022-05-17 LAB
BASOPHILS # BLD AUTO: 0.04 K/UL (ref 0–0.2)
BASOPHILS NFR BLD: 0.4 % (ref 0–1.9)
DIFFERENTIAL METHOD: ABNORMAL
EOSINOPHIL # BLD AUTO: 0.1 K/UL (ref 0–0.5)
EOSINOPHIL NFR BLD: 0.6 % (ref 0–8)
ERYTHROCYTE [DISTWIDTH] IN BLOOD BY AUTOMATED COUNT: 14.3 % (ref 11.5–14.5)
HCT VFR BLD AUTO: 27.9 % (ref 37–48.5)
HGB BLD-MCNC: 9.4 G/DL (ref 12–16)
HIV 1+2 AB+HIV1 P24 AG SERPL QL IA: NEGATIVE
IMM GRANULOCYTES # BLD AUTO: 0.05 K/UL (ref 0–0.04)
IMM GRANULOCYTES NFR BLD AUTO: 0.5 % (ref 0–0.5)
LYMPHOCYTES # BLD AUTO: 1.3 K/UL (ref 1–4.8)
LYMPHOCYTES NFR BLD: 14 % (ref 18–48)
MCH RBC QN AUTO: 26.5 PG (ref 27–31)
MCHC RBC AUTO-ENTMCNC: 33.7 G/DL (ref 32–36)
MCV RBC AUTO: 79 FL (ref 82–98)
MONOCYTES # BLD AUTO: 0.7 K/UL (ref 0.3–1)
MONOCYTES NFR BLD: 7.8 % (ref 4–15)
NEUTROPHILS # BLD AUTO: 7.1 K/UL (ref 1.8–7.7)
NEUTROPHILS NFR BLD: 76.7 % (ref 38–73)
NRBC BLD-RTO: 0 /100 WBC
PLATELET # BLD AUTO: 208 K/UL (ref 150–450)
PMV BLD AUTO: 10 FL (ref 9.2–12.9)
RBC # BLD AUTO: 3.55 M/UL (ref 4–5.4)
WBC # BLD AUTO: 9.3 K/UL (ref 3.9–12.7)

## 2022-05-17 PROCEDURE — 11000001 HC ACUTE MED/SURG PRIVATE ROOM

## 2022-05-17 PROCEDURE — 99231 SBSQ HOSP IP/OBS SF/LOW 25: CPT | Mod: ,,, | Performed by: OBSTETRICS & GYNECOLOGY

## 2022-05-17 PROCEDURE — 85025 COMPLETE CBC W/AUTO DIFF WBC: CPT | Performed by: OBSTETRICS & GYNECOLOGY

## 2022-05-17 PROCEDURE — 25000003 PHARM REV CODE 250: Performed by: STUDENT IN AN ORGANIZED HEALTH CARE EDUCATION/TRAINING PROGRAM

## 2022-05-17 PROCEDURE — 36415 COLL VENOUS BLD VENIPUNCTURE: CPT | Performed by: OBSTETRICS & GYNECOLOGY

## 2022-05-17 PROCEDURE — 25000003 PHARM REV CODE 250: Performed by: OBSTETRICS & GYNECOLOGY

## 2022-05-17 PROCEDURE — 63600175 PHARM REV CODE 636 W HCPCS: Performed by: OBSTETRICS & GYNECOLOGY

## 2022-05-17 PROCEDURE — 99231 PR SUBSEQUENT HOSPITAL CARE,LEVL I: ICD-10-PCS | Mod: ,,, | Performed by: OBSTETRICS & GYNECOLOGY

## 2022-05-17 RX ORDER — OXYCODONE AND ACETAMINOPHEN 5; 325 MG/1; MG/1
1 TABLET ORAL EVERY 4 HOURS PRN
Qty: 28 TABLET | Refills: 0 | Status: SHIPPED | OUTPATIENT
Start: 2022-05-17 | End: 2022-11-15

## 2022-05-17 RX ORDER — IBUPROFEN 800 MG/1
800 TABLET ORAL EVERY 8 HOURS PRN
Qty: 30 TABLET | Refills: 0 | Status: SHIPPED | OUTPATIENT
Start: 2022-05-17 | End: 2022-11-15

## 2022-05-17 RX ADMIN — ACETAMINOPHEN 650 MG: 325 TABLET ORAL at 05:05

## 2022-05-17 RX ADMIN — IBUPROFEN 600 MG: 600 TABLET ORAL at 11:05

## 2022-05-17 RX ADMIN — DIPHENHYDRAMINE HYDROCHLORIDE 25 MG: 25 CAPSULE ORAL at 02:05

## 2022-05-17 RX ADMIN — DOCUSATE SODIUM 200 MG: 100 CAPSULE, LIQUID FILLED ORAL at 09:05

## 2022-05-17 RX ADMIN — PRENATAL VIT W/ FE FUMARATE-FA TAB 27-0.8 MG 1 TABLET: 27-0.8 TAB at 08:05

## 2022-05-17 RX ADMIN — OXYCODONE HYDROCHLORIDE AND ACETAMINOPHEN 1 TABLET: 5; 325 TABLET ORAL at 10:05

## 2022-05-17 RX ADMIN — OXYCODONE 5 MG: 5 TABLET ORAL at 05:05

## 2022-05-17 RX ADMIN — DIPHENHYDRAMINE HYDROCHLORIDE 25 MG: 25 CAPSULE ORAL at 03:05

## 2022-05-17 RX ADMIN — MUPIROCIN: 20 OINTMENT TOPICAL at 09:05

## 2022-05-17 RX ADMIN — IBUPROFEN 600 MG: 600 TABLET ORAL at 06:05

## 2022-05-17 RX ADMIN — OXYCODONE HYDROCHLORIDE AND ACETAMINOPHEN 1 TABLET: 10; 325 TABLET ORAL at 06:05

## 2022-05-17 RX ADMIN — MUPIROCIN: 20 OINTMENT TOPICAL at 08:05

## 2022-05-17 RX ADMIN — ACETAMINOPHEN 650 MG: 325 TABLET ORAL at 11:05

## 2022-05-17 RX ADMIN — OXYCODONE 5 MG: 5 TABLET ORAL at 11:05

## 2022-05-17 RX ADMIN — DOCUSATE SODIUM 200 MG: 100 CAPSULE, LIQUID FILLED ORAL at 08:05

## 2022-05-17 RX ADMIN — KETOROLAC TROMETHAMINE 30 MG: 30 INJECTION, SOLUTION INTRAMUSCULAR at 05:05

## 2022-05-17 NOTE — PLAN OF CARE
Note copied from Infant's chart ( MRN: [id number])     SOCIAL WORK DISCHARGE PLANNING ASSESSMENT    Sw completed discharge planning assessment with pt's mother in mother's room K305.  Pt's mother was easily engaged and education on the role of  was provided. Pt's father was at bedside during time of visit. Pt's mother reported all necessities for patient were obtained, including a car seat. Pt's father Felice Kelley will provide transportation to family home following discharge. Pt's mother reported she has good family support and family will provide assistance as needed after returning home. Resource information on [facility] benefits were provided to patient's mother and no other needs for community resources were reported. SW left discharge brochure and contact information. Pt's mother was encouraged to call with any questions or concerns. Pt's mother verbalized understanding.     Legal Name: Cristina Kelley  :  2022  Address: [address] [place] [address]  Parent's Phone Numbers: [phone] ( Mother- Demario Christine)  [phone] (Father- Felice Kelley)     Pediatrician: Dr. Marcelo Avery         Patient Active Problem List   Diagnosis    Term  delivered by , current hospitalization         [facility]irth Hospital:[facility]   SONYA: 2022    Birth Weight: 3.4 kg (7 lb 7.9 oz)  Birth Length: 49.5 cm  Gestational Age: 39w0d          Apgars    Living status: Living  Apgars:  1 min.:  5 min.:  10 min.:  15 min.:  20 min.:    Skin color:  1  1       Heart rate:  2  2       Reflex irritability:  2  2       Muscle tone:  2  2       Respiratory effort:  2  2       Total:  9  9       Apgars assigned by: DENICE TRONCOSO           22 1347   OB Discharge Planning Assessment   Assessment Type Discharge Planning Assessment   Source of Information family  (Demario Christine [phone] ( Mother))   Verified Demographic and Insurance Information Yes   Insurance Medicaid   Medicaid  Healthy [employer]   Spiritual Affiliation Religious   Name of Support/Comfort Primary Source Demario Christine [phone] ( Mother)   Father's Involvement Fully Involved   Is Father signing the birth certificate Yes   Father's Address [facility] [place] [address]   Family Involvement High   Primary Contact Name and Number Demario Christine [phone]  ( Mother)   Other Contacts Names and Numbers Felice Kelley [phone] ( Father)    Roxane Christine [phone] ( Maternal grandmother)   Received Prenatal Care Yes   Transportation Anticipated family or friend will provide   Receive WIC Benefits Not interested    Arrangements Self;Family;Friends;Day Care   Infant Feeding Plan breastfeeding   Breast Pump Needed no   Does baby have crib or safe sleep space? Yes   Do you have a car seat? Yes   Has other essential care items? Clothing;Bottles;Diapers   Pediatrician Dr. Marcelo Avery   Resources/Education Provided WIC   DCFS No indications (Indicators for Report)   Discharge Plan A Home with family

## 2022-05-17 NOTE — LACTATION NOTE
[facility] - Mother [facility] Baby  Lactation Note - Mom    SUMMARY     Maternal Assessment    Breast Size Issue: other (see comments) (breasts on smaller side & slightly wide-spaced)  Breast Shape: Bilateral:, angled, wide  Breast Density: Bilateral:, soft  Areola: Bilateral:, elastic  Nipples: Bilateral:, everted  Left Nipple Symptoms: tender  Right Nipple Symptoms: tender      LATCH Score         Breasts WDL    Breast WDL: WDL except, nipple symptoms  Left Nipple Symptoms: tender  Right Nipple Symptoms: tender    Maternal Infant Feeding    Maternal Preparation: breast care  Maternal Emotional State: assist needed, relaxed  Infant Positioning: clutch/football  Signs of Milk Transfer: infant jaw motion present  Pain with Feeding: yes (5/10 w/initial latch; decreases to 0 after few mins of sucking)  Pain Location: nipples, bilateral  Pain Description: soreness  Comfort Measures Before/During Feeding: infant position adjusted, latch adjusted, maternal position adjusted, suction broken using finger  Comfort Measures Following Feeding: air-drying encouraged  Latch Assistance: yes    Lactation Referrals    Lactation Referrals: outpatient lactation program, pediatric care provider  Outpatient Lactation Program Lactation Follow-up Date/Time: call lact ctr PRN  Pediatric Care Provider Lactation Follow-up Date/Time: f/u in 2-3 days after discharge or sooner as directed by NNP    Lactation Interventions    Breast Care: Breastfeeding: breast milk to nipples, lanolin to nipples, open to air  Breastfeeding Assistance: assisted with positioning, feeding cue recognition promoted, feeding on demand promoted, feeding session observed, hand expression verified, infant latch-on verified, infant stimulated to wakeful state, infant suck/swallow verified, support offered  Breast Care: Breastfeeding: breast milk to nipples, lanolin to nipples, open to air  Breastfeeding Assistance: assisted with positioning, feeding cue recognition promoted,  feeding on demand promoted, feeding session observed, hand expression verified, infant latch-on verified, infant stimulated to wakeful state, infant suck/swallow verified, support offered  Fetal Wellbeing Promotion: intake and output monitored  Breastfeeding Support: diary/feeding log utilized, encouragement provided, lactation counseling provided, maternal rest encouraged       Breastfeeding Session    Breast Pumping Interventions: other (see comments) (disucssed indications for when to start pumping)  Infant Positioning: clutch/football  Signs of Milk Transfer: infant jaw motion present    Maternal Information

## 2022-05-17 NOTE — LACTATION NOTE
This note was copied from a baby's chart.  Reviewed waking techniques. Reports other child required photo therapy. Discussed breastfeeding and jaundice. Encouraged hand expression after feedings to give additional milk and to encouraged milk supply.      [facility]  Lactation Note - Baby    SUMMARY     Feeding Method    breastfeeding    Breastfeeding    breastfeeding, bilateral    LATCH Score    Latch: 2-->grasps breast, tongue down, lips flanged, rhythmic sucking  Audible Swallowin-->a few with stimulation  Type of Nipple: 2-->everted (after stimulation)  Comfort (Breast/Nipple): 2-->soft/nontender  Hold (Positioning): 1-->minimal assist, teach one side, mother does other, staff holds  Score: 8    Breastfeeding Supplementation         Nutrition Interventions    Hypoglycemia Management (Infant): breastfeeding promoted  Oral Nutrition Promotion (Infant): breastfeeding promoted

## 2022-05-17 NOTE — ANESTHESIA POSTPROCEDURE EVALUATION
Anesthesia Post Evaluation    Patient: Demario Christine    Procedure(s) Performed: Procedure(s) (LRB):  DELIVERY-CEASAREAN SECTION (N/A)    Final Anesthesia Type: CSE      Patient location during evaluation: labor & delivery  Patient participation: Yes- Able to Participate  Level of consciousness: awake and alert and oriented  Post-procedure vital signs: reviewed and stable  Pain management: adequate  Airway patency: patent    PONV status at discharge: No PONV  Anesthetic complications: no      Cardiovascular status: blood pressure returned to baseline  Respiratory status: unassisted  Hydration status: euvolemic  Follow-up not needed.      No catheter in back  No headache/neckache/backache  Full return of neurological function  Able to urinate  Advised patient to report any new problems of back pain, especially with fever or decreasing bladder function occurring during coming days to weeks        Vitals Value Taken Time   /64 05/17/22 0738   Temp 36.7 °C (98 °F) 05/17/22 0738   Pulse 76 05/17/22 0738   Resp 18 05/17/22 0738   SpO2 96 % 05/17/22 0738         No case tracking events are documented in the log.      Pain/Tanner Score: Pain Rating Prior to Med Admin: 5 (5/17/2022  5:36 AM)  Pain Rating Post Med Admin: 0 (5/17/2022  6:30 AM)

## 2022-05-17 NOTE — PROGRESS NOTES
POD #1    S: patient doing well. Reports itching. Passing flatus. Voiding spontaneously. Ambulating. Tolerating PO. Pain ok.    O  Temp:  [97.7 °F (36.5 °C)-98.7 °F (37.1 °C)]   Pulse:  []   Resp:  [0-19]   BP: ()/(56-82)   SpO2:  [96 %-100 %]    Gen: A&ox3, NAD  Abd: soft, incision with aquacel c/d/i  Ext: no edema    Lab Results   Component Value Date    WBC 9.30 2022    HGB 9.4 (L) 2022    HCT 27.9 (L) 2022    MCV 79 (L) 2022     2022       B POS    AP: 31 yo  female s/p rLTCS, doing well.  --continue routine pp care    SHERWIN Jimenez MD

## 2022-05-17 NOTE — PLAN OF CARE
Rounded on pt. Assisted with position & latch. Good latch noted in football hold on R side. Sucking/swallowing noted. Mom asking about formula. Discouraged if BR well. Discussed benefits of BR; AAP recommendations; supply/demand; national formula shortage. Questions answered. Mom will continue to exclusively breastfeed frequently & on cue at least 8+ times/24 hrs.  Will monitor for signs of deep latch & adequate fdg; I&O.  Will have baby's weight checked at ped's office in the next couple of days after d/c from hospital as recommended. Instructed to call for any questions/needs. Verbalized understanding.

## 2022-05-17 NOTE — PLAN OF CARE
SW visited pt's room to complete discharge planning assessment. Pt was visiting with lactation nurse. SW will follow up to complete assessment.          05/17/22 5937   OB Discharge Planning Assessment   Assessment Type Discharge Planning Assessment

## 2022-05-18 VITALS
SYSTOLIC BLOOD PRESSURE: 118 MMHG | DIASTOLIC BLOOD PRESSURE: 77 MMHG | RESPIRATION RATE: 18 BRPM | TEMPERATURE: 98 F | HEART RATE: 77 BPM | OXYGEN SATURATION: 99 %

## 2022-05-18 PROCEDURE — 25000003 PHARM REV CODE 250: Performed by: OBSTETRICS & GYNECOLOGY

## 2022-05-18 PROCEDURE — 99238 HOSP IP/OBS DSCHRG MGMT 30/<: CPT | Mod: ,,, | Performed by: OBSTETRICS & GYNECOLOGY

## 2022-05-18 PROCEDURE — 99238 PR HOSPITAL DISCHARGE DAY,<30 MIN: ICD-10-PCS | Mod: ,,, | Performed by: OBSTETRICS & GYNECOLOGY

## 2022-05-18 RX ADMIN — OXYCODONE HYDROCHLORIDE AND ACETAMINOPHEN 1 TABLET: 5; 325 TABLET ORAL at 12:05

## 2022-05-18 RX ADMIN — IBUPROFEN 600 MG: 600 TABLET ORAL at 12:05

## 2022-05-18 RX ADMIN — MUPIROCIN: 20 OINTMENT TOPICAL at 09:05

## 2022-05-18 NOTE — DISCHARGE SUMMARY
[facility]  Obstetrics  Discharge Summary      Patient Name: Demario Christine  MRN: [id number]  Admission Date: 2022  Hospital Length of Stay: 2 days  Discharge Date and Time:  2022 6:56 AM  Attending Physician: Rae Jimenez MD   Discharging Provider: Rae Jimenez MD  Primary Care Provider: Cheryl Ba MD    HPI: 31 yo now  female admitted at 39+ weeks for rLTCS.    Procedure(s) (LRB):  DELIVERY-CEASAREAN SECTION (N/A)     Hospital Course: The patient's  was uncomplicated. Please see my operative note for details of procedure. She is now s/p uncomplicated normal spontaneous vaginal delivery. Her postpartum course was also uncomplicated. On day of discharge, she was tolerating PO. Her pain was well controlled. She was ambulating and voiding spontaneously.           Final Active Diagnoses:    Diagnosis Date Noted POA    PRINCIPAL PROBLEM:  S/P  section [Z98.891] 2022 Not Applicable      Problems Resolved During this Admission:        Lab Results   Component Value Date    WBC 9.30 2022    HGB 9.4 (L) 2022    HCT 27.9 (L) 2022    MCV 79 (L) 2022     2022       B POS      Feeding Method: breast    Immunizations     Date Immunization Status Dose Route/Site Given by    22 1003 Tdap Given 0.5 mL Intramuscular/Right deltoid Cristy Sharif RN    22 1430 MMR Incomplete 0.5 mL Subcutaneous/           Delivery:    Episiotomy: None   Lacerations: None   Repair suture:     Repair # of packets:     Blood loss (ml):       Birth information:  YOB: 2022   Time of birth: 11:36 AM   Sex: female   Delivery type: , Low Transverse   Gestational Age: 39w0d    Delivery Clinician:      Other providers:       Additional  information:  Forceps:    Vacuum:    Breech:    Observed anomalies      Living?:           APGARS  One minute Five minutes Ten minutes   Skin color:         Heart rate:          Grimace:         Muscle tone:         Breathing:         Totals: 9  9        Placenta: Delivered:       appearance    Pending Diagnostic Studies:     None          Discharged Condition: good    Disposition: Home or Self Care    Follow Up:   Follow-up Information     Rae Jimenez MD Follow up on 5/23/2022.    Specialties: Obstetrics, Obstetrics and Gynecology  Why: 4pm for incision check  Contact information:  [address]  [address]  [place] [address]  [phone]             Rae Jimenez, Patient Care Assistant .                     Patient Instructions:   No discharge procedures on file.  Medications:  Current Discharge Medication List      CONTINUE these medications which have NOT CHANGED    Details   ibuprofen (ADVIL,MOTRIN) 800 MG tablet Take 1 tablet (800 mg total) by mouth every 8 (eight) hours as needed for Pain.  Qty: 30 tablet, Refills: 0    Associated Diagnoses: Postoperative pain      !! ondansetron (ZOFRAN-ODT) 4 MG TbDL Take 1 tablet (4 mg total) by mouth every 8 (eight) hours as needed (nausea).  Qty: 30 tablet, Refills: 2    Associated Diagnoses: Amenorrhea; Nausea      !! ondansetron (ZOFRAN-ODT) 4 MG TbDL Take 1 tablet (4 mg total) by mouth every 8 (eight) hours as needed (nausea).  Qty: 30 tablet, Refills: 2    Associated Diagnoses: Encounter for supervision of other normal pregnancy in first trimester; Nausea and vomiting during pregnancy      oxyCODONE-acetaminophen (PERCOCET) 5-325 mg per tablet Take 1 tablet by mouth every 4 (four) hours as needed for Pain.  Qty: 28 tablet, Refills: 0    Comments: Quantity prescribed more than 7 day supply? No  Associated Diagnoses: Postoperative pain      prenatal vit/iron fum/folic ac (PRENATAL 1+1 ORAL) Take by mouth.       !! - Potential duplicate medications found. Please discuss with provider.          Rae Jimenez MD  Obstetrics  [facility]

## 2022-05-18 NOTE — LACTATION NOTE
Rounded on couplet. Mother reports infant BR well but reports pain to nipples with feeding. Denies bleeding, cracking, bruising, or redness. States applying EBM and lanolin PRN after feedings. Discussed importance of deep latch and close positioning. Assistance provided. Infant pulls in upper lip and demonstrated to mom how to manually flange. Mom reports latch feels more comfortable. Return demonstrated appropriate latching. Praise and encouragement provided. Breastfeeding discharge instructions given and first alert form reviewed. Mom reports planning to do both BR/FF. Discussed benefits of exclusive BR and potential risks of FF - especially with formula shortage - including flow dependence, decreased milk supply, nipple confusion.   Mother will breastfeed on cue at least 8 or more times in 24 hours. Mother will monitor for adequate supply and monitor wet and dirty diapers. Mother will call for any breastfeeding needs.

## 2022-05-18 NOTE — PHYSICIAN QUERY
PT Name: Demario Christine  MR #: [id number]     DOCUMENTATION CLARIFICATION      CDS/: MARCIAL Noe,RNC-MNN        Contact information:[email or web address]     This form is a permanent document in the medical record.     Query Date: May 18, 2022    Dear Provider,  By submitting this query, we are merely seeking further clarification of documentation.  Please utilize your independent clinical judgment when addressing the question(s) below.      The patient has an order for the following medication(s):    methylergonovine tablet 0.2 mg   Frequency: Once    Please provide the corresponding diagnosis or diagnoses that support(s) the use of the medication(s) mentioned above.        Provider Use Only      Diagnosis(es): ___Postpartum bleeding________________        [  ]  Clinically undetermined

## 2022-05-18 NOTE — LACTATION NOTE
[facility]  Lactation Note - Mom    SUMMARY     Maternal Assessment    Breast Size Issue: other (see comments) (breasts on smaller side & slightly wide-spaced)  Breast Shape: Bilateral:, wide, angled  Breast Density: Bilateral:, soft  Areola: Bilateral:, elastic  Nipples: Bilateral:, everted, graspable  Left Nipple Symptoms: tender (w/ latching)  Right Nipple Symptoms: tender (w/ latching)      LATCH Score     see  flowsheets    Breasts WDL    Breast WDL: WDL except, nipple symptoms  Left Nipple Symptoms: tender (w/ latching)  Right Nipple Symptoms: tender (w/ latching)    Maternal Infant Feeding    Maternal Preparation: breast care, hand hygiene  Maternal Emotional State: assist needed, relaxed  Infant Positioning: clutch/football  Signs of Milk Transfer: audible swallow  Pain with Feeding: yes  Pain Location: nipples, bilateral  Pain Description: soreness  Comfort Measures Before/During Feeding: suction broken using finger, latch adjusted, infant position adjusted  Comfort Measures Following Feeding: air-drying encouraged, expressed milk applied  Latch Assistance: yes    Lactation Referrals    Lactation Referrals: outpatient lactation program, pediatric care provider  Outpatient Lactation Program Lactation Follow-up Date/Time: lac [facility]tr phone number given and first alert form reviewed  Pediatric Care Provider Lactation Follow-up Date/Time: f/u w/ ped in 2-3 days for wt check    Lactation Interventions    Breast Care: Breastfeeding: breast milk to nipples, open to air, lanolin to nipples  Breastfeeding Assistance: support offered, feeding cue recognition promoted, feeding on demand promoted, infant suck/swallow verified, infant latch-on verified, hand expression verified, assisted with positioning  Breast Care: Breastfeeding: breast milk to nipples, open to air, lanolin to nipples  Breastfeeding Assistance: support offered, feeding cue recognition promoted, feeding on demand promoted, infant  suck/swallow verified, infant latch-on verified, hand expression verified, assisted with positioning  Fetal Wellbeing Promotion: intake and output monitored  Breastfeeding Support: diary/feeding log utilized, encouragement provided, maternal rest encouraged, maternal nutrition promoted, maternal hydration promoted, infant-mother separation minimized       Breastfeeding Session    Breast Pumping Interventions: other (see comments) (disucssed indications for when to start pumping)  Infant Positioning: clutch/football  Signs of Milk Transfer: audible swallow    Maternal Information

## 2022-05-18 NOTE — NURSING
Awake on rounds, oob in chair. States pain under control from previous pain med. vsx stable. Continue poc.

## 2022-05-23 ENCOUNTER — POSTPARTUM VISIT (OUTPATIENT)
Dept: OBSTETRICS AND GYNECOLOGY | Facility: CLINIC | Age: 32
End: 2022-05-23
Payer: MEDICAID

## 2022-05-23 VITALS
SYSTOLIC BLOOD PRESSURE: 124 MMHG | DIASTOLIC BLOOD PRESSURE: 80 MMHG | BODY MASS INDEX: 35.04 KG/M2 | WEIGHT: 210.56 LBS

## 2022-05-23 PROCEDURE — 99999 PR PBB SHADOW E&M-EST. PATIENT-LVL II: CPT | Mod: PBBFAC,,, | Performed by: OBSTETRICS & GYNECOLOGY

## 2022-05-23 PROCEDURE — 59430 PR CARE AFTER DELIVERY ONLY: ICD-10-PCS | Mod: S$PBB,,, | Performed by: OBSTETRICS & GYNECOLOGY

## 2022-05-23 PROCEDURE — 99999 PR PBB SHADOW E&M-EST. PATIENT-LVL II: ICD-10-PCS | Mod: PBBFAC,,, | Performed by: OBSTETRICS & GYNECOLOGY

## 2022-05-23 PROCEDURE — 99212 OFFICE O/P EST SF 10 MIN: CPT | Mod: PBBFAC,PO | Performed by: OBSTETRICS & GYNECOLOGY

## 2022-05-23 NOTE — PROGRESS NOTES
The patient presents for her postpartum visit/incision check. She has no complaints today. Denies fever, chills. She denies pain with urination.  Denies diarrhea. Denies constipation. She denies problems with her incision.  Pain well controlled with PO pain medications.      Type of Delivery:  She had rLTCS  Delivery Date:  5/16/2022  Delivery MD: halle jimenez  Gender: female  Baby Name:  Bomi  Breast Feeding: yes  Vaginal Bleeding: no  Incontinence: no  Depression: feeling overwhelmed  Contraception discussed and patient desires micronor    ROS: negative    PE:   Vitals: /80   Wt 95.5 kg (210 lb 8.6 oz)   LMP 08/08/2021   Breastfeeding Yes   BMI 35.04 kg/m²   APPEARANCE: Well nourished, well developed, in no acute distress.  ABDOMEN: Soft. No tenderness or masses. No hepatosplenomegaly. No hernias. Incision c/d/i; no erythema or purulent discharge  PELVIC: deferred    A/P: Postpartum visit/Incision check  -patient doing well, incision healing well  -contraception: wants micronor    F/u in 1 wk incision check    SHERWIN Jimenez MD

## 2022-05-30 ENCOUNTER — PATIENT MESSAGE (OUTPATIENT)
Dept: ADMINISTRATIVE | Facility: HOSPITAL | Age: 32
End: 2022-05-30
Payer: MEDICAID

## 2022-05-30 ENCOUNTER — POSTPARTUM VISIT (OUTPATIENT)
Dept: OBSTETRICS AND GYNECOLOGY | Facility: CLINIC | Age: 32
End: 2022-05-30
Payer: MEDICAID

## 2022-05-30 VITALS
BODY MASS INDEX: 34.41 KG/M2 | DIASTOLIC BLOOD PRESSURE: 78 MMHG | WEIGHT: 206.81 LBS | SYSTOLIC BLOOD PRESSURE: 121 MMHG

## 2022-05-30 DIAGNOSIS — Z30.011 ENCOUNTER FOR INITIAL PRESCRIPTION OF CONTRACEPTIVE PILLS: ICD-10-CM

## 2022-05-30 PROCEDURE — 99999 PR PBB SHADOW E&M-EST. PATIENT-LVL II: ICD-10-PCS | Mod: PBBFAC,,, | Performed by: OBSTETRICS & GYNECOLOGY

## 2022-05-30 PROCEDURE — 0503F POSTPARTUM CARE VISIT: CPT | Mod: CPTII,,, | Performed by: OBSTETRICS & GYNECOLOGY

## 2022-05-30 PROCEDURE — 99999 PR PBB SHADOW E&M-EST. PATIENT-LVL II: CPT | Mod: PBBFAC,,, | Performed by: OBSTETRICS & GYNECOLOGY

## 2022-05-30 PROCEDURE — 99212 OFFICE O/P EST SF 10 MIN: CPT | Mod: PBBFAC,PO | Performed by: OBSTETRICS & GYNECOLOGY

## 2022-05-30 PROCEDURE — 0503F PR POSTPARTUM CARE VISIT: ICD-10-PCS | Mod: CPTII,,, | Performed by: OBSTETRICS & GYNECOLOGY

## 2022-05-30 RX ORDER — ACETAMINOPHEN AND CODEINE PHOSPHATE 120; 12 MG/5ML; MG/5ML
1 SOLUTION ORAL DAILY
Qty: 30 TABLET | Refills: 11 | Status: SHIPPED | OUTPATIENT
Start: 2022-05-30 | End: 2022-07-11 | Stop reason: SDUPTHER

## 2022-05-30 RX ORDER — CEPHALEXIN 500 MG/1
500 CAPSULE ORAL EVERY 12 HOURS
Qty: 20 CAPSULE | Refills: 0 | Status: SHIPPED | OUTPATIENT
Start: 2022-05-30 | End: 2022-06-09

## 2022-05-30 NOTE — PROGRESS NOTES
The patient presents for her postpartum visit/incision check. Concerned about infection.  Odor slight discharge from the incision.   Denies fever, chills. She denies pain with urination.  Denies diarrhea. Denies constipation. Pain well controlled with PO pain medications.      Type of Delivery:  She had rLTCS  Delivery Date:  5/16/2022  Delivery MD: halle jimenez  Gender: female  Baby Name:  Bomi  Breast Feeding: yes  Vaginal Bleeding: no  Incontinence: no  Depression: feeling overwhelmed  Contraception discussed and patient desires micronor    ROS: negative    PE:   Vitals: /78   Wt 93.8 kg (206 lb 12.7 oz)   LMP 08/08/2021   BMI 34.41 kg/m²   APPEARANCE: Well nourished, well developed, in no acute distress.  ABDOMEN: Soft. No tenderness or masses. No hepatosplenomegaly. No hernias. Incision c/d/i; no erythema or purulent discharge  PELVIC: deferred    A/P: Postpartum visit/Incision check  -patient doing well, incision healing well - but rx for keflex sent  -contraception: wants micronor (rx sent)    F/u in 4 wk incision check    Patient and family moving away after next visit    SHERWIN Jimenez MD

## 2022-06-17 ENCOUNTER — PATIENT MESSAGE (OUTPATIENT)
Dept: ADMINISTRATIVE | Facility: HOSPITAL | Age: 32
End: 2022-06-17
Payer: MEDICAID

## 2022-06-22 ENCOUNTER — POSTPARTUM VISIT (OUTPATIENT)
Dept: OBSTETRICS AND GYNECOLOGY | Facility: CLINIC | Age: 32
End: 2022-06-22
Payer: MEDICAID

## 2022-06-22 VITALS
BODY MASS INDEX: 34.49 KG/M2 | HEIGHT: 65 IN | DIASTOLIC BLOOD PRESSURE: 80 MMHG | WEIGHT: 207 LBS | SYSTOLIC BLOOD PRESSURE: 120 MMHG

## 2022-06-22 DIAGNOSIS — Z12.4 CERVICAL CANCER SCREENING: ICD-10-CM

## 2022-06-22 PROCEDURE — 99213 OFFICE O/P EST LOW 20 MIN: CPT | Mod: PBBFAC,PO | Performed by: OBSTETRICS & GYNECOLOGY

## 2022-06-22 PROCEDURE — 99999 PR PBB SHADOW E&M-EST. PATIENT-LVL III: CPT | Mod: PBBFAC,,, | Performed by: OBSTETRICS & GYNECOLOGY

## 2022-06-22 PROCEDURE — 87624 HPV HI-RISK TYP POOLED RSLT: CPT | Performed by: OBSTETRICS & GYNECOLOGY

## 2022-06-22 PROCEDURE — 99999 PR PBB SHADOW E&M-EST. PATIENT-LVL III: ICD-10-PCS | Mod: PBBFAC,,, | Performed by: OBSTETRICS & GYNECOLOGY

## 2022-06-22 PROCEDURE — 88175 CYTOPATH C/V AUTO FLUID REDO: CPT | Performed by: OBSTETRICS & GYNECOLOGY

## 2022-06-22 PROCEDURE — 0503F PR POSTPARTUM CARE VISIT: ICD-10-PCS | Mod: CPTII,,, | Performed by: OBSTETRICS & GYNECOLOGY

## 2022-06-22 PROCEDURE — 0503F POSTPARTUM CARE VISIT: CPT | Mod: CPTII,,, | Performed by: OBSTETRICS & GYNECOLOGY

## 2022-06-22 NOTE — PROGRESS NOTES
"  The patient presents for her postpartum visit/incision check. No issues. Incision on the right edge is slight open per patient - but no other issues.  Denies fever, chills. She denies pain with urination.  Denies diarrhea. Denies constipation. .    Type of Delivery:  She had rLTCS  Delivery Date:  5/16/2022  Delivery MD: halle amato  Gender: female  Baby Name:  Cristina  Breast Feeding: yes  Vaginal Bleeding: no  Incontinence: no  Contraception discussed and patient desires micronor (already started)    ROS: negative    PE:   Vitals: /80   Ht 5' 5" (1.651 m)   Wt 93.9 kg (207 lb 0.2 oz)   LMP 08/08/2021   BMI 34.45 kg/m²   APPEARANCE: Well nourished, well developed, in no acute distress.  ABDOMEN: Soft. No tenderness or masses. No hepatosplenomegaly. No hernias. Incision c/d/i; no erythema or purulent discharge  PELVIC: deferred    A/P: Postpartum visit/Incision check  -patient doing well, incision healing well -   -contraception: continue     Pap and HPV collected today    S MD Tony      "

## 2022-06-30 LAB
FINAL PATHOLOGIC DIAGNOSIS: NORMAL
HPV HR 12 DNA SPEC QL NAA+PROBE: NEGATIVE
HPV16 AG SPEC QL: NEGATIVE
HPV18 DNA SPEC QL NAA+PROBE: NEGATIVE
Lab: NORMAL

## 2022-07-11 ENCOUNTER — PATIENT MESSAGE (OUTPATIENT)
Dept: OBSTETRICS AND GYNECOLOGY | Facility: CLINIC | Age: 32
End: 2022-07-11
Payer: MEDICAID

## 2022-07-11 DIAGNOSIS — Z30.011 ENCOUNTER FOR INITIAL PRESCRIPTION OF CONTRACEPTIVE PILLS: ICD-10-CM

## 2022-07-12 NOTE — TELEPHONE ENCOUNTER
----- Message from Mikki Spain sent at 7/11/2022 10:55 AM CDT -----  Type:  Needs Medical Advice    Who Called: pt  Symptoms (please be specific): pt is requesting a pharmacy change to a different location pt is out of town the medication is  norethindrone (ORTHO MICRONOR) 0.35 mg tablet  How long has patient had these symptoms:    Pharmacy name and phone #:  [place] [address] [facility] [address] [address] [phone]  Would the patient rather a call back or a response via [facility]? Please call  Best Call Back Number: [phone]  Additional Information:

## 2022-07-22 NOTE — PROGRESS NOTES
pap and hpv are normal    Your pelvic exam will still need to occur once a year!    See you then!    Dr amato

## 2022-08-04 ENCOUNTER — TELEPHONE (OUTPATIENT)
Dept: OBSTETRICS AND GYNECOLOGY | Facility: CLINIC | Age: 32
End: 2022-08-04
Payer: MEDICAID

## 2022-08-04 RX ORDER — ACETAMINOPHEN AND CODEINE PHOSPHATE 120; 12 MG/5ML; MG/5ML
1 SOLUTION ORAL DAILY
Qty: 30 TABLET | Refills: 11 | Status: SHIPPED | OUTPATIENT
Start: 2022-08-04 | End: 2022-08-08 | Stop reason: SDUPTHER

## 2022-08-04 NOTE — TELEPHONE ENCOUNTER
----- Message from Leni Addison sent at 8/4/2022  2:43 PM CDT -----  Contact: Kaylin [facility]'s [phone]  Type: Requesting to speak with nurse    Who Called: Kaylin  Regarding: has questions on medication   Would the patient rather a call back or a response via [facility]? Call back  Best Call Back Number: [phone]   Additional Information: n/a

## 2022-09-12 ENCOUNTER — PATIENT OUTREACH (OUTPATIENT)
Dept: ADMINISTRATIVE | Facility: HOSPITAL | Age: 32
End: 2022-09-12
Payer: MEDICAID

## 2022-10-14 ENCOUNTER — TELEPHONE (OUTPATIENT)
Dept: FAMILY MEDICINE | Facility: CLINIC | Age: 32
End: 2022-10-14

## 2022-10-14 NOTE — TELEPHONE ENCOUNTER
----- Message from Cassie Baer sent at 10/14/2022 10:42 AM CDT -----    Name of Who is Calling: YOUNG GROSS [[id number]]      What is the request in detail: Pt is requesting a call back for scheduling, she would like to be seen before February.      Can the clinic reply by [facility]: No      What [facility] to [facility] Back [facility]: [phone]

## 2022-11-15 ENCOUNTER — OFFICE VISIT (OUTPATIENT)
Dept: FAMILY MEDICINE | Facility: CLINIC | Age: 32
End: 2022-11-15
Payer: MEDICAID

## 2022-11-15 VITALS
DIASTOLIC BLOOD PRESSURE: 82 MMHG | RESPIRATION RATE: 18 BRPM | WEIGHT: 210.31 LBS | HEIGHT: 65 IN | SYSTOLIC BLOOD PRESSURE: 122 MMHG | BODY MASS INDEX: 35.04 KG/M2 | OXYGEN SATURATION: 97 % | TEMPERATURE: 99 F | HEART RATE: 100 BPM

## 2022-11-15 DIAGNOSIS — D50.9 MICROCYTIC ANEMIA: ICD-10-CM

## 2022-11-15 DIAGNOSIS — B37.31 VAGINAL YEAST INFECTION: ICD-10-CM

## 2022-11-15 DIAGNOSIS — N30.00 ACUTE CYSTITIS WITHOUT HEMATURIA: ICD-10-CM

## 2022-11-15 DIAGNOSIS — Z30.8 ENCOUNTER FOR OTHER CONTRACEPTIVE MANAGEMENT: ICD-10-CM

## 2022-11-15 DIAGNOSIS — Z00.00 ANNUAL PHYSICAL EXAM: Primary | ICD-10-CM

## 2022-11-15 PROBLEM — O16.3 ELEVATED BLOOD PRESSURE AFFECTING PREGNANCY IN THIRD TRIMESTER, ANTEPARTUM: Status: RESOLVED | Noted: 2020-11-20 | Resolved: 2022-11-15

## 2022-11-15 PROBLEM — O26.899 ABDOMINAL PAIN AFFECTING PREGNANCY: Status: RESOLVED | Noted: 2020-11-29 | Resolved: 2022-11-15

## 2022-11-15 PROBLEM — R10.9 ABDOMINAL PAIN AFFECTING PREGNANCY: Status: RESOLVED | Noted: 2020-11-29 | Resolved: 2022-11-15

## 2022-11-15 PROCEDURE — 3074F PR MOST RECENT SYSTOLIC BLOOD PRESSURE < 130 MM HG: ICD-10-PCS | Mod: CPTII,,, | Performed by: STUDENT IN AN ORGANIZED HEALTH CARE EDUCATION/TRAINING PROGRAM

## 2022-11-15 PROCEDURE — 3079F DIAST BP 80-89 MM HG: CPT | Mod: CPTII,,, | Performed by: STUDENT IN AN ORGANIZED HEALTH CARE EDUCATION/TRAINING PROGRAM

## 2022-11-15 PROCEDURE — 99999 PR PBB SHADOW E&M-EST. PATIENT-LVL III: ICD-10-PCS | Mod: PBBFAC,,, | Performed by: STUDENT IN AN ORGANIZED HEALTH CARE EDUCATION/TRAINING PROGRAM

## 2022-11-15 PROCEDURE — 1159F PR MEDICATION LIST DOCUMENTED IN MEDICAL RECORD: ICD-10-PCS | Mod: CPTII,,, | Performed by: STUDENT IN AN ORGANIZED HEALTH CARE EDUCATION/TRAINING PROGRAM

## 2022-11-15 PROCEDURE — 99395 PREV VISIT EST AGE 18-39: CPT | Mod: S$PBB,,, | Performed by: STUDENT IN AN ORGANIZED HEALTH CARE EDUCATION/TRAINING PROGRAM

## 2022-11-15 PROCEDURE — 99213 OFFICE O/P EST LOW 20 MIN: CPT | Mod: PBBFAC,PO | Performed by: STUDENT IN AN ORGANIZED HEALTH CARE EDUCATION/TRAINING PROGRAM

## 2022-11-15 PROCEDURE — 1159F MED LIST DOCD IN RCRD: CPT | Mod: CPTII,,, | Performed by: STUDENT IN AN ORGANIZED HEALTH CARE EDUCATION/TRAINING PROGRAM

## 2022-11-15 PROCEDURE — 3079F PR MOST RECENT DIASTOLIC BLOOD PRESSURE 80-89 MM HG: ICD-10-PCS | Mod: CPTII,,, | Performed by: STUDENT IN AN ORGANIZED HEALTH CARE EDUCATION/TRAINING PROGRAM

## 2022-11-15 PROCEDURE — 3074F SYST BP LT 130 MM HG: CPT | Mod: CPTII,,, | Performed by: STUDENT IN AN ORGANIZED HEALTH CARE EDUCATION/TRAINING PROGRAM

## 2022-11-15 PROCEDURE — 3008F PR BODY MASS INDEX (BMI) DOCUMENTED: ICD-10-PCS | Mod: CPTII,,, | Performed by: STUDENT IN AN ORGANIZED HEALTH CARE EDUCATION/TRAINING PROGRAM

## 2022-11-15 PROCEDURE — 3008F BODY MASS INDEX DOCD: CPT | Mod: CPTII,,, | Performed by: STUDENT IN AN ORGANIZED HEALTH CARE EDUCATION/TRAINING PROGRAM

## 2022-11-15 PROCEDURE — 99999 PR PBB SHADOW E&M-EST. PATIENT-LVL III: CPT | Mod: PBBFAC,,, | Performed by: STUDENT IN AN ORGANIZED HEALTH CARE EDUCATION/TRAINING PROGRAM

## 2022-11-15 PROCEDURE — 99395 PR PREVENTIVE VISIT,EST,18-39: ICD-10-PCS | Mod: S$PBB,,, | Performed by: STUDENT IN AN ORGANIZED HEALTH CARE EDUCATION/TRAINING PROGRAM

## 2022-11-15 RX ORDER — CHLORHEXIDINE GLUCONATE ORAL RINSE 1.2 MG/ML
SOLUTION DENTAL
COMMUNITY
Start: 2022-10-25

## 2022-11-15 RX ORDER — SULFAMETHOXAZOLE AND TRIMETHOPRIM 800; 160 MG/1; MG/1
1 TABLET ORAL 2 TIMES DAILY
Qty: 14 TABLET | Refills: 0 | Status: SHIPPED | OUTPATIENT
Start: 2022-11-15 | End: 2022-11-22

## 2022-11-15 RX ORDER — ACETAMINOPHEN AND CODEINE PHOSPHATE 120; 12 MG/5ML; MG/5ML
1 SOLUTION ORAL DAILY
Qty: 90 TABLET | Refills: 3 | Status: SHIPPED | OUTPATIENT
Start: 2022-11-15 | End: 2023-11-15

## 2022-11-15 RX ORDER — FLUCONAZOLE 150 MG/1
150 TABLET ORAL EVERY OTHER DAY
Qty: 2 TABLET | Refills: 0 | Status: SHIPPED | OUTPATIENT
Start: 2022-11-15 | End: 2022-11-18

## 2022-11-15 RX ORDER — CLINDAMYCIN HYDROCHLORIDE 150 MG/1
300 CAPSULE ORAL EVERY 6 HOURS
COMMUNITY
Start: 2022-10-25

## 2022-11-15 NOTE — PROGRESS NOTES
SUBJECTIVE     Chief Complaint   Patient presents with    Annual Exam    Medication Problem     Pt suspects antibiotic gave her a UTI       HPI  Demario Christine is a 32 y.o. female with  migraines  that presents for annual exam.     Pt does report history of migraine with aura. She is doing well on micronor. Curious about other methods.     Patient reports she is overall doing well without complaint, SAHM to 2 under 2. Stopped breastfeeding last week.    Pt also reports urgency and dysuria.    PAST MEDICAL HISTORY:  Past Medical History:   Diagnosis Date    Chronic diarrhea     Migraine headache        PAST SURGICAL HISTORY:  Past Surgical History:   Procedure Laterality Date     SECTION N/A 2020    Procedure:  SECTION;  Surgeon: Rae Jimenez MD;  Location: [facility] L&D;  Service: OB/GYN;  Laterality: N/A;     SECTION N/A 2022    Procedure: DELIVERY-CEASAREAN SECTION;  Surgeon: Rae Jimenez MD;  Location: [facility] L&D;  Service: OB/GYN;  Laterality: N/A;    COLONOSCOPY N/A 5/15/2017    Procedure: COLONOSCOPY;  Surgeon: Errol Hernandez MD;  Location: [address];  Service: Endoscopy;  Laterality: N/A;       FAMILY HISTORY:  Family History   Problem Relation Age of Onset    Hypertension Mother     No Known Problems Father     No Known Problems Sister     No Known Problems Brother     No Known Problems Maternal Aunt     No Known Problems Maternal Uncle     No Known Problems Paternal Aunt     No Known Problems Paternal Uncle     Cataracts Maternal Grandmother     No Known Problems Maternal Grandfather     No Known Problems Paternal Grandmother     No Known Problems Paternal Grandfather     Blindness Neg Hx     Cancer Neg Hx     Diabetes Neg Hx     Glaucoma Neg Hx     Inflammatory bowel disease Neg Hx     Stomach cancer Neg Hx     Colon cancer Neg Hx     Rectal cancer Neg Hx     Esophageal cancer Neg Hx     Crohn's disease Neg Hx     Celiac disease Neg Hx     Amblyopia  "Neg Hx     Macular degeneration Neg Hx     Retinal detachment Neg Hx     Strabismus Neg Hx     Stroke Neg Hx     Thyroid disease Neg Hx        ALLERGIES AND MEDICATIONS: updated and reviewed.  Review of patient's allergies indicates:  No Known Allergies  Current Outpatient Medications   Medication Sig Dispense Refill    clindamycin (CLEOCIN) 150 MG capsule Take 300 mg by mouth every 6 (six) hours.      chlorhexidine (PERIDEX) 0.12 % solution SMARTSI Capful(s) By Mouth Twice Daily      fluconazole (DIFLUCAN) 150 MG Tab Take 1 tablet (150 mg total) by mouth every other day. for 2 doses 2 tablet 0    norethindrone (ORTHO MICRONOR) 0.35 mg tablet Take 1 tablet (0.35 mg total) by mouth once daily. 90 tablet 3    prenatal vit/iron fum/folic ac (PRENATAL 1+1 ORAL) Take by mouth.      sulfamethoxazole-trimethoprim 800-160mg (BACTRIM DS) 800-160 mg Tab Take 1 tablet by mouth 2 (two) times daily. for 7 days 14 tablet 0     No current facility-administered medications for this visit.       ROS  Review of Systems   Constitutional:  Negative for chills, fatigue and fever.   HENT:  Negative for rhinorrhea and sore throat.    Respiratory:  Negative for cough and shortness of breath.    Cardiovascular:  Negative for chest pain and palpitations.   Gastrointestinal:  Negative for constipation, diarrhea, nausea and vomiting.   Genitourinary:  Positive for dysuria and urgency. Negative for frequency.   Musculoskeletal:  Negative for joint swelling.   Skin:  Negative for rash and wound.   Neurological:  Negative for light-headedness and headaches.   Psychiatric/Behavioral:  Negative for dysphoric mood and sleep disturbance. The patient is not nervous/anxious.        OBJECTIVE     Physical Exam  Vitals:    11/15/22 1349   BP: 122/82   Pulse: 100   Resp: 18   Temp: 98.7 °F (37.1 °C)    Body mass index is 35 kg/m².  Weight: 95.4 kg (210 lb 5.1 oz)   Height: 5' 5" (165.1 cm)     Physical Exam  Vitals reviewed.   Constitutional:       " General: She is not in acute distress.  HENT:      Right Ear: External ear normal.      Left Ear: External ear normal.      Nose: Nose normal.      Mouth/Throat:      Mouth: Mucous membranes are moist.   Eyes:      Extraocular Movements: Extraocular movements intact.      Conjunctiva/sclera: Conjunctivae normal.      Pupils: Pupils are equal, round, and reactive to light.   Cardiovascular:      Rate and Rhythm: Normal rate and regular rhythm.      Pulses: Normal pulses.      Heart sounds: No murmur heard.  Pulmonary:      Effort: Pulmonary effort is normal.      Breath sounds: Normal breath sounds.   Abdominal:      General: Bowel sounds are normal. There is no distension.      Palpations: Abdomen is soft.      Tenderness: There is no abdominal tenderness.   Musculoskeletal:         General: No swelling or tenderness. Normal range of motion.      Cervical back: Normal range of motion and neck supple.   Skin:     General: Skin is warm and dry.      Findings: No rash.   Neurological:      General: No focal deficit present.      Mental Status: She is alert and oriented to person, place, and time.   Psychiatric:         Mood and Affect: Mood normal.         Behavior: Behavior normal.         Health Maintenance         Date Due Completion Date    Influenza Vaccine (1) 09/01/2022 11/17/2021    Cervical Cancer Screening 06/22/2027 6/22/2022    TETANUS VACCINE 05/11/2032 5/11/2022              ASSESSMENT     32 y.o. female with     1. Annual physical exam    2. Encounter for other contraceptive management    3. Microcytic anemia    4. Vaginal yeast infection    5. Acute cystitis without hematuria        PLAN:     1. Encounter for other contraceptive management  Discussed options for hormonal and non-hormonal contraceptive including: condoms, OCPs, patch, NuvaRing, Depo Provera, Nexplanon, Mirena, and copper IUD with pt's hx of migraine w/ aura in mind. Encouraged condom use for STI prevention as well as a secondary  contraceptive for back up pregnancy prevention. Discussed indications, directions for use, and side effects/risks of these methods of contraception. Discussed emergency contraception such as Plan B including indications for use, where to obtain, directions for use, and side effects/risks. Provided time for answering patient's questions and addressing her concerns regarding these options. Patient would like to remain on micronor.  - norethindrone (ORTHO MICRONOR) 0.35 mg tablet; Take 1 tablet (0.35 mg total) by mouth once daily.  Dispense: 90 tablet; Refill: 3    2. Annual physical exam  - Discussed age and gender appropriate screenings at this visit and encouraged a healthy diet low in simple carbohydrates, and increased physical activity.  Counseled on medically appropriate vaccines based on age and current health condition.  Screening test reviewed and discussed with patient.   - CBC Auto Differential; Future  - Comprehensive Metabolic Panel; Future  - TSH; Future  - Lipid Panel; Future  - Hemoglobin A1C; Future    3. Microcytic anemia  - noted on prior labs, due for recheck, ordered.  - Ferritin; Future  - Iron and TIBC; Future    4. Vaginal yeast infection  - New. Treat with diflucan. Follo wup if not resolved 1 week.  - fluconazole (DIFLUCAN) 150 MG Tab; Take 1 tablet (150 mg total) by mouth every other day. for 2 doses  Dispense: 2 tablet; Refill: 0    5. Acute cystitis without hematuria  - New. Treat with bactrim. Follow up if not resolved 1 week.  - sulfamethoxazole-trimethoprim 800-160mg (BACTRIM DS) 800-160 mg Tab; Take 1 tablet by mouth 2 (two) times daily. for 7 days  Dispense: 14 tablet; Refill: 0        Norma Wadsworth MD  11/15/2022 1:46 PM        No follow-ups on file.

## 2023-03-20 ENCOUNTER — TELEPHONE (OUTPATIENT)
Dept: OBSTETRICS AND GYNECOLOGY | Facility: CLINIC | Age: 33
End: 2023-03-20
Payer: MEDICAID

## 2023-03-20 RX ORDER — ONDANSETRON 4 MG/1
4 TABLET, FILM COATED ORAL DAILY PRN
Qty: 30 TABLET | Refills: 12 | Status: SHIPPED | OUTPATIENT
Start: 2023-03-20 | End: 2023-03-23 | Stop reason: SDUPTHER

## 2023-03-20 NOTE — TELEPHONE ENCOUNTER
----- Message from Nicki Flaherty sent at 3/17/2023  8:53 AM CDT -----  Name Of Caller:Demario            Provider Name:            Does patient feel the need to be seen today?No            Relationship to the Pt?:Pt            Contact Preference?:[phone]            What is the nature of the call?: Pt would like a refill on nausea medication.

## 2023-03-22 ENCOUNTER — PATIENT MESSAGE (OUTPATIENT)
Dept: OBSTETRICS AND GYNECOLOGY | Facility: CLINIC | Age: 33
End: 2023-03-22
Payer: MEDICAID

## 2023-04-19 ENCOUNTER — LAB VISIT (OUTPATIENT)
Dept: LAB | Facility: HOSPITAL | Age: 33
End: 2023-04-19
Attending: OBSTETRICS & GYNECOLOGY
Payer: MEDICAID

## 2023-04-19 ENCOUNTER — OFFICE VISIT (OUTPATIENT)
Dept: OBSTETRICS AND GYNECOLOGY | Facility: CLINIC | Age: 33
End: 2023-04-19
Payer: MEDICAID

## 2023-04-19 VITALS
DIASTOLIC BLOOD PRESSURE: 77 MMHG | SYSTOLIC BLOOD PRESSURE: 121 MMHG | BODY MASS INDEX: 30.08 KG/M2 | HEIGHT: 65 IN | WEIGHT: 180.56 LBS

## 2023-04-19 DIAGNOSIS — N91.2 AMENORRHEA: ICD-10-CM

## 2023-04-19 DIAGNOSIS — N91.2 AMENORRHEA: Primary | ICD-10-CM

## 2023-04-19 LAB
ABO + RH BLD: NORMAL
BASOPHILS # BLD AUTO: 0.04 K/UL (ref 0–0.2)
BASOPHILS NFR BLD: 0.5 % (ref 0–1.9)
BLD GP AB SCN CELLS X3 SERPL QL: NORMAL
DIFFERENTIAL METHOD: ABNORMAL
EOSINOPHIL # BLD AUTO: 0.1 K/UL (ref 0–0.5)
EOSINOPHIL NFR BLD: 0.7 % (ref 0–8)
ERYTHROCYTE [DISTWIDTH] IN BLOOD BY AUTOMATED COUNT: 12.2 % (ref 11.5–14.5)
HCT VFR BLD AUTO: 35.3 % (ref 37–48.5)
HCV AB SERPL QL IA: NORMAL
HGB BLD-MCNC: 12.6 G/DL (ref 12–16)
HIV 1+2 AB+HIV1 P24 AG SERPL QL IA: NORMAL
IMM GRANULOCYTES # BLD AUTO: 0.02 K/UL (ref 0–0.04)
IMM GRANULOCYTES NFR BLD AUTO: 0.3 % (ref 0–0.5)
LYMPHOCYTES # BLD AUTO: 1.7 K/UL (ref 1–4.8)
LYMPHOCYTES NFR BLD: 22.6 % (ref 18–48)
MCH RBC QN AUTO: 28.6 PG (ref 27–31)
MCHC RBC AUTO-ENTMCNC: 35.7 G/DL (ref 32–36)
MCV RBC AUTO: 80 FL (ref 82–98)
MONOCYTES # BLD AUTO: 0.6 K/UL (ref 0.3–1)
MONOCYTES NFR BLD: 8.2 % (ref 4–15)
NEUTROPHILS # BLD AUTO: 5.1 K/UL (ref 1.8–7.7)
NEUTROPHILS NFR BLD: 67.7 % (ref 38–73)
NRBC BLD-RTO: 0 /100 WBC
PLATELET # BLD AUTO: 287 K/UL (ref 150–450)
PMV BLD AUTO: 9.8 FL (ref 9.2–12.9)
RBC # BLD AUTO: 4.41 M/UL (ref 4–5.4)
WBC # BLD AUTO: 7.53 K/UL (ref 3.9–12.7)

## 2023-04-19 PROCEDURE — 99213 OFFICE O/P EST LOW 20 MIN: CPT | Mod: PBBFAC,PO | Performed by: OBSTETRICS & GYNECOLOGY

## 2023-04-19 PROCEDURE — 3008F BODY MASS INDEX DOCD: CPT | Mod: CPTII,,, | Performed by: OBSTETRICS & GYNECOLOGY

## 2023-04-19 PROCEDURE — 86803 HEPATITIS C AB TEST: CPT | Performed by: OBSTETRICS & GYNECOLOGY

## 2023-04-19 PROCEDURE — 3008F PR BODY MASS INDEX (BMI) DOCUMENTED: ICD-10-PCS | Mod: CPTII,,, | Performed by: OBSTETRICS & GYNECOLOGY

## 2023-04-19 PROCEDURE — 1159F MED LIST DOCD IN RCRD: CPT | Mod: CPTII,,, | Performed by: OBSTETRICS & GYNECOLOGY

## 2023-04-19 PROCEDURE — 3074F PR MOST RECENT SYSTOLIC BLOOD PRESSURE < 130 MM HG: ICD-10-PCS | Mod: CPTII,,, | Performed by: OBSTETRICS & GYNECOLOGY

## 2023-04-19 PROCEDURE — 87591 N.GONORRHOEAE DNA AMP PROB: CPT | Performed by: OBSTETRICS & GYNECOLOGY

## 2023-04-19 PROCEDURE — 1159F PR MEDICATION LIST DOCUMENTED IN MEDICAL RECORD: ICD-10-PCS | Mod: CPTII,,, | Performed by: OBSTETRICS & GYNECOLOGY

## 2023-04-19 PROCEDURE — 3078F PR MOST RECENT DIASTOLIC BLOOD PRESSURE < 80 MM HG: ICD-10-PCS | Mod: CPTII,,, | Performed by: OBSTETRICS & GYNECOLOGY

## 2023-04-19 PROCEDURE — 86900 BLOOD TYPING SEROLOGIC ABO: CPT | Performed by: OBSTETRICS & GYNECOLOGY

## 2023-04-19 PROCEDURE — 86762 RUBELLA ANTIBODY: CPT | Performed by: OBSTETRICS & GYNECOLOGY

## 2023-04-19 PROCEDURE — 87340 HEPATITIS B SURFACE AG IA: CPT | Performed by: OBSTETRICS & GYNECOLOGY

## 2023-04-19 PROCEDURE — 85025 COMPLETE CBC W/AUTO DIFF WBC: CPT | Performed by: OBSTETRICS & GYNECOLOGY

## 2023-04-19 PROCEDURE — 99203 OFFICE O/P NEW LOW 30 MIN: CPT | Mod: TH,S$PBB,, | Performed by: OBSTETRICS & GYNECOLOGY

## 2023-04-19 PROCEDURE — 87086 URINE CULTURE/COLONY COUNT: CPT | Performed by: OBSTETRICS & GYNECOLOGY

## 2023-04-19 PROCEDURE — 3078F DIAST BP <80 MM HG: CPT | Mod: CPTII,,, | Performed by: OBSTETRICS & GYNECOLOGY

## 2023-04-19 PROCEDURE — 99999 PR PBB SHADOW E&M-EST. PATIENT-LVL III: CPT | Mod: PBBFAC,,, | Performed by: OBSTETRICS & GYNECOLOGY

## 2023-04-19 PROCEDURE — 99999 PR PBB SHADOW E&M-EST. PATIENT-LVL III: ICD-10-PCS | Mod: PBBFAC,,, | Performed by: OBSTETRICS & GYNECOLOGY

## 2023-04-19 PROCEDURE — 3074F SYST BP LT 130 MM HG: CPT | Mod: CPTII,,, | Performed by: OBSTETRICS & GYNECOLOGY

## 2023-04-19 PROCEDURE — 99203 PR OFFICE/OUTPT VISIT, NEW, LEVL III, 30-44 MIN: ICD-10-PCS | Mod: TH,S$PBB,, | Performed by: OBSTETRICS & GYNECOLOGY

## 2023-04-19 PROCEDURE — 36415 COLL VENOUS BLD VENIPUNCTURE: CPT | Performed by: OBSTETRICS & GYNECOLOGY

## 2023-04-19 PROCEDURE — 86592 SYPHILIS TEST NON-TREP QUAL: CPT | Performed by: OBSTETRICS & GYNECOLOGY

## 2023-04-19 PROCEDURE — 87389 HIV-1 AG W/HIV-1&-2 AB AG IA: CPT | Performed by: OBSTETRICS & GYNECOLOGY

## 2023-04-19 NOTE — PROGRESS NOTES
"34 yo  female with amenorrhea who presents to confirm her pregnancy.    Obhx:  G1: term, LTCS   G2: TAB  G3: rLTCS  G4: current    PMH: denies  Surgery: LTCS x 2  ALL NKDA  Gynhx: remote h/o STDs; pap from 2022 uptodate    ROS:  GENERAL: Denies weight gain or weight loss. Feeling well overall.   SKIN: Denies rash or lesions.   HEAD: Denies head injury or headache.   CHEST: Denies chest pain or shortness of breath.   CARDIOVASCULAR: Denies palpitations or left sided chest pain.   ABDOMEN: No abdominal pain, constipation, diarrhea, nausea, vomiting or rectal bleeding.   URINARY: No frequency, dysuria, hematuria, or burning on urination.  REPRODUCTIVE: no issues  BREASTS: denies pain, lumps, or nipple discharge.   HEMATOLOGIC: No easy bruisability or excessive bleeding.   MUSCULOSKELETAL: Denies joint pain or swelling.   NEUROLOGIC: Denies syncope or weakness.   PSYCHIATRIC: Denies depression, anxiety or mood swings.         PE  /77   Ht 5' 5" (1.651 m)   Wt 81.9 kg (180 lb 8.9 oz)   LMP 2023   BMI 30.05 kg/m²   Exam: deferred    Hand held: SIUP +FCA      1) Amenorrhea  Office UPT positive  Dating scan ordered  New OB labs ordered    2) h/o LTCS x 2; wants repeat    3) PP  Wants BTL    SHERWIN amato MD  "

## 2023-04-20 LAB
HBV SURFACE AG SERPL QL IA: NORMAL
RPR SER QL: NORMAL
RUBV IGG SER-ACNC: 42.2 IU/ML
RUBV IGG SER-IMP: REACTIVE

## 2023-04-21 LAB — BACTERIA UR CULT: NO GROWTH

## 2023-04-22 LAB
C TRACH DNA SPEC QL NAA+PROBE: NOT DETECTED
N GONORRHOEA DNA SPEC QL NAA+PROBE: NOT DETECTED

## 2023-04-25 ENCOUNTER — PROCEDURE VISIT (OUTPATIENT)
Dept: MATERNAL FETAL MEDICINE | Facility: CLINIC | Age: 33
End: 2023-04-25
Payer: MEDICAID

## 2023-04-25 DIAGNOSIS — N91.2 AMENORRHEA: ICD-10-CM

## 2023-04-25 PROCEDURE — 76801 OB US < 14 WKS SINGLE FETUS: CPT | Mod: PBBFAC,PO | Performed by: OBSTETRICS & GYNECOLOGY

## 2023-04-25 PROCEDURE — 76801 US OB/GYN PROCEDURE (VIEWPOINT): ICD-10-PCS | Mod: 26,S$PBB,, | Performed by: OBSTETRICS & GYNECOLOGY

## 2023-05-10 ENCOUNTER — ROUTINE PRENATAL (OUTPATIENT)
Dept: OBSTETRICS AND GYNECOLOGY | Facility: CLINIC | Age: 33
End: 2023-05-10
Payer: MEDICAID

## 2023-05-10 VITALS
BODY MASS INDEX: 30.23 KG/M2 | DIASTOLIC BLOOD PRESSURE: 70 MMHG | WEIGHT: 181.69 LBS | SYSTOLIC BLOOD PRESSURE: 123 MMHG

## 2023-05-10 DIAGNOSIS — Z3A.14 14 WEEKS GESTATION OF PREGNANCY: ICD-10-CM

## 2023-05-10 DIAGNOSIS — Z34.82 ENCOUNTER FOR SUPERVISION OF OTHER NORMAL PREGNANCY IN SECOND TRIMESTER: Primary | ICD-10-CM

## 2023-05-10 PROCEDURE — 99213 OFFICE O/P EST LOW 20 MIN: CPT | Mod: TH,S$PBB,, | Performed by: OBSTETRICS & GYNECOLOGY

## 2023-05-10 PROCEDURE — 99213 PR OFFICE/OUTPT VISIT, EST, LEVL III, 20-29 MIN: ICD-10-PCS | Mod: TH,S$PBB,, | Performed by: OBSTETRICS & GYNECOLOGY

## 2023-05-10 PROCEDURE — 99999 PR PBB SHADOW E&M-EST. PATIENT-LVL II: CPT | Mod: PBBFAC,,, | Performed by: OBSTETRICS & GYNECOLOGY

## 2023-05-10 PROCEDURE — 99999 PR PBB SHADOW E&M-EST. PATIENT-LVL II: ICD-10-PCS | Mod: PBBFAC,,, | Performed by: OBSTETRICS & GYNECOLOGY

## 2023-05-10 PROCEDURE — 99212 OFFICE O/P EST SF 10 MIN: CPT | Mod: PBBFAC,TH,PO | Performed by: OBSTETRICS & GYNECOLOGY

## 2023-05-10 NOTE — PROGRESS NOTES
34 yo  female @ 14.4 wks by 12.3 wk cherelle (EDC 2023) who presents for OB care.      1) SIUP  -rh positive  -mat 21 ordered    2) h/o LTCS x 2; wants repeat    3) PP  Wants BTL    F/u in   S MD lm

## 2023-05-20 ENCOUNTER — PATIENT MESSAGE (OUTPATIENT)
Dept: OTHER | Facility: OTHER | Age: 33
End: 2023-05-20
Payer: MEDICAID

## 2023-05-27 ENCOUNTER — PATIENT MESSAGE (OUTPATIENT)
Dept: OTHER | Facility: OTHER | Age: 33
End: 2023-05-27
Payer: MEDICAID

## 2023-05-30 ENCOUNTER — ROUTINE PRENATAL (OUTPATIENT)
Dept: OBSTETRICS AND GYNECOLOGY | Facility: CLINIC | Age: 33
End: 2023-05-30
Payer: MEDICAID

## 2023-05-30 VITALS
SYSTOLIC BLOOD PRESSURE: 118 MMHG | DIASTOLIC BLOOD PRESSURE: 74 MMHG | WEIGHT: 189.13 LBS | BODY MASS INDEX: 31.48 KG/M2

## 2023-05-30 DIAGNOSIS — Z3A.17 17 WEEKS GESTATION OF PREGNANCY: ICD-10-CM

## 2023-05-30 DIAGNOSIS — Z34.82 ENCOUNTER FOR SUPERVISION OF OTHER NORMAL PREGNANCY IN SECOND TRIMESTER: Primary | ICD-10-CM

## 2023-05-30 PROCEDURE — 99999 PR PBB SHADOW E&M-EST. PATIENT-LVL I: ICD-10-PCS | Mod: PBBFAC,,, | Performed by: OBSTETRICS & GYNECOLOGY

## 2023-05-30 PROCEDURE — 99211 OFF/OP EST MAY X REQ PHY/QHP: CPT | Mod: PBBFAC,TH,PO | Performed by: OBSTETRICS & GYNECOLOGY

## 2023-05-30 PROCEDURE — 99999 PR PBB SHADOW E&M-EST. PATIENT-LVL I: CPT | Mod: PBBFAC,,, | Performed by: OBSTETRICS & GYNECOLOGY

## 2023-05-30 PROCEDURE — 99213 OFFICE O/P EST LOW 20 MIN: CPT | Mod: TH,S$PBB,, | Performed by: OBSTETRICS & GYNECOLOGY

## 2023-05-30 PROCEDURE — 99213 PR OFFICE/OUTPT VISIT, EST, LEVL III, 20-29 MIN: ICD-10-PCS | Mod: TH,S$PBB,, | Performed by: OBSTETRICS & GYNECOLOGY

## 2023-05-30 NOTE — PROGRESS NOTES
Spotting a few days ago after lots of activity. None since that dash.    34 yo  female @ 17.3 wks by 12.3 wk cherelle (EDC 2023) who presents for OB care.      1) SIUP (it's a boy NOT named Felice )  -rh positive  -mat 21 neg  -consents for rLTCS/BTL signed 2023  -medicaid tubal form signed 2023  -wants circ    2) h/o LTCS x 2; wants repeat    3) PP  Wants BTL    F/u in  3 wks for anatomy  F/u in 4-5 wks for OB care    SHERWIN amato MD

## 2023-06-17 ENCOUNTER — PATIENT MESSAGE (OUTPATIENT)
Dept: OTHER | Facility: OTHER | Age: 33
End: 2023-06-17
Payer: MEDICAID

## 2023-06-20 ENCOUNTER — PROCEDURE VISIT (OUTPATIENT)
Dept: MATERNAL FETAL MEDICINE | Facility: CLINIC | Age: 33
End: 2023-06-20
Payer: MEDICAID

## 2023-06-20 DIAGNOSIS — Z34.82 ENCOUNTER FOR SUPERVISION OF OTHER NORMAL PREGNANCY IN SECOND TRIMESTER: ICD-10-CM

## 2023-06-20 DIAGNOSIS — Z3A.17 17 WEEKS GESTATION OF PREGNANCY: ICD-10-CM

## 2023-06-20 PROCEDURE — 76805 US OB/GYN PROCEDURE (VIEWPOINT): ICD-10-PCS | Mod: 26,S$PBB,, | Performed by: OBSTETRICS & GYNECOLOGY

## 2023-06-20 PROCEDURE — 76805 OB US >/= 14 WKS SNGL FETUS: CPT | Mod: PBBFAC,PO | Performed by: OBSTETRICS & GYNECOLOGY

## 2023-06-28 ENCOUNTER — ROUTINE PRENATAL (OUTPATIENT)
Dept: OBSTETRICS AND GYNECOLOGY | Facility: CLINIC | Age: 33
End: 2023-06-28
Payer: MEDICAID

## 2023-06-28 VITALS
WEIGHT: 191.56 LBS | BODY MASS INDEX: 31.88 KG/M2 | DIASTOLIC BLOOD PRESSURE: 76 MMHG | SYSTOLIC BLOOD PRESSURE: 117 MMHG

## 2023-06-28 DIAGNOSIS — Z34.82 ENCOUNTER FOR SUPERVISION OF OTHER NORMAL PREGNANCY IN SECOND TRIMESTER: Primary | ICD-10-CM

## 2023-06-28 DIAGNOSIS — Z3A.21 21 WEEKS GESTATION OF PREGNANCY: ICD-10-CM

## 2023-06-28 PROCEDURE — 99999 PR PBB SHADOW E&M-EST. PATIENT-LVL III: CPT | Mod: PBBFAC,,, | Performed by: OBSTETRICS & GYNECOLOGY

## 2023-06-28 PROCEDURE — 99213 OFFICE O/P EST LOW 20 MIN: CPT | Mod: PBBFAC,TH,PO | Performed by: OBSTETRICS & GYNECOLOGY

## 2023-06-28 PROCEDURE — 99213 OFFICE O/P EST LOW 20 MIN: CPT | Mod: TH,S$PBB,, | Performed by: OBSTETRICS & GYNECOLOGY

## 2023-06-28 PROCEDURE — 99999 PR PBB SHADOW E&M-EST. PATIENT-LVL III: ICD-10-PCS | Mod: PBBFAC,,, | Performed by: OBSTETRICS & GYNECOLOGY

## 2023-06-28 PROCEDURE — 99213 PR OFFICE/OUTPT VISIT, EST, LEVL III, 20-29 MIN: ICD-10-PCS | Mod: TH,S$PBB,, | Performed by: OBSTETRICS & GYNECOLOGY

## 2023-06-28 NOTE — PROGRESS NOTES
No complaints today.    34 yo  female @ 21.4 wks by 12.3 wk sonbriseida (EDC 2023) who presents for OB care.      1) SIUP (it's a boy NOT named Felice Dash)  -suboptimal anatomy  -rh positive  -mat 21 neg  -consents for rLTCS/BTL signed 2023  -medicaid tubal form signed 2023  -wants circ    2) h/o LTCS x 2; wants repeat    3) PP  Wants BTL    F/u anatomy ordered  1 hr gtt ordered    Ok with delivery on 10/31    S MD lm

## 2023-07-21 ENCOUNTER — LAB VISIT (OUTPATIENT)
Dept: LAB | Facility: HOSPITAL | Age: 33
End: 2023-07-21
Payer: MEDICAID

## 2023-07-21 ENCOUNTER — PROCEDURE VISIT (OUTPATIENT)
Dept: MATERNAL FETAL MEDICINE | Facility: CLINIC | Age: 33
End: 2023-07-21
Payer: MEDICAID

## 2023-07-21 DIAGNOSIS — Z36.4 ULTRASOUND FOR ANTENATAL SCREENING FOR FETAL GROWTH RESTRICTION: ICD-10-CM

## 2023-07-21 DIAGNOSIS — Z3A.21 21 WEEKS GESTATION OF PREGNANCY: ICD-10-CM

## 2023-07-21 DIAGNOSIS — Z36.2 ENCOUNTER FOR FOLLOW-UP ULTRASOUND OF FETAL ANATOMY: ICD-10-CM

## 2023-07-21 DIAGNOSIS — Z34.82 ENCOUNTER FOR SUPERVISION OF OTHER NORMAL PREGNANCY IN SECOND TRIMESTER: ICD-10-CM

## 2023-07-21 DIAGNOSIS — Z3A.24 24 WEEKS GESTATION OF PREGNANCY: Primary | ICD-10-CM

## 2023-07-21 LAB — GLUCOSE SERPL-MCNC: 78 MG/DL (ref 70–140)

## 2023-07-21 PROCEDURE — 76816 OB US FOLLOW-UP PER FETUS: CPT | Mod: 26,S$PBB,, | Performed by: OBSTETRICS & GYNECOLOGY

## 2023-07-21 PROCEDURE — 76816 OB US FOLLOW-UP PER FETUS: CPT | Mod: PBBFAC,PO | Performed by: OBSTETRICS & GYNECOLOGY

## 2023-07-21 PROCEDURE — 76816 PR  US,PREGNANT UTERUS,F/U,TRANSABD APP: ICD-10-PCS | Mod: 26,S$PBB,, | Performed by: OBSTETRICS & GYNECOLOGY

## 2023-07-21 PROCEDURE — 36415 COLL VENOUS BLD VENIPUNCTURE: CPT | Performed by: OBSTETRICS & GYNECOLOGY

## 2023-07-21 PROCEDURE — 82950 GLUCOSE TEST: CPT | Performed by: OBSTETRICS & GYNECOLOGY

## 2023-07-27 ENCOUNTER — ROUTINE PRENATAL (OUTPATIENT)
Dept: OBSTETRICS AND GYNECOLOGY | Facility: CLINIC | Age: 33
End: 2023-07-27
Payer: MEDICAID

## 2023-07-27 VITALS
BODY MASS INDEX: 33.16 KG/M2 | DIASTOLIC BLOOD PRESSURE: 82 MMHG | WEIGHT: 199.31 LBS | SYSTOLIC BLOOD PRESSURE: 116 MMHG

## 2023-07-27 DIAGNOSIS — Z34.82 ENCOUNTER FOR SUPERVISION OF OTHER NORMAL PREGNANCY IN SECOND TRIMESTER: Primary | ICD-10-CM

## 2023-07-27 DIAGNOSIS — Z3A.25 25 WEEKS GESTATION OF PREGNANCY: ICD-10-CM

## 2023-07-27 PROCEDURE — 99213 PR OFFICE/OUTPT VISIT, EST, LEVL III, 20-29 MIN: ICD-10-PCS | Mod: TH,S$PBB,, | Performed by: OBSTETRICS & GYNECOLOGY

## 2023-07-27 PROCEDURE — 99213 OFFICE O/P EST LOW 20 MIN: CPT | Mod: TH,S$PBB,, | Performed by: OBSTETRICS & GYNECOLOGY

## 2023-07-27 PROCEDURE — 99999 PR PBB SHADOW E&M-EST. PATIENT-LVL I: ICD-10-PCS | Mod: PBBFAC,,, | Performed by: OBSTETRICS & GYNECOLOGY

## 2023-07-27 PROCEDURE — 99211 OFF/OP EST MAY X REQ PHY/QHP: CPT | Mod: PBBFAC,TH,PO | Performed by: OBSTETRICS & GYNECOLOGY

## 2023-07-27 PROCEDURE — 99999 PR PBB SHADOW E&M-EST. PATIENT-LVL I: CPT | Mod: PBBFAC,,, | Performed by: OBSTETRICS & GYNECOLOGY

## 2023-07-27 PROCEDURE — 87086 URINE CULTURE/COLONY COUNT: CPT | Performed by: OBSTETRICS & GYNECOLOGY

## 2023-07-27 NOTE — PROGRESS NOTES
Pain with urination/cramping. Urine cx sent.    34 yo  female @ 25.5 wks by 12.3 wk sono (EDC 2023) who presents for OB care.      1) SIUP (it's a boy NOT named Felice Dash - maybe Maykel)  -suboptimal anatomy  -rh positive  -mat 21 neg  -consents for rLTCS/BTL signed 2023  -medicaid tubal form signed 2023  -wants circ  -1 hr gtt wnl    2) h/o LTCS x 2; wants repeat    3) PP  Wants BTL    F/u in 4 wks OB visit  Discuss tdap at next visit    Ok with delivery on 10/31    S MD lm

## 2023-07-29 ENCOUNTER — PATIENT MESSAGE (OUTPATIENT)
Dept: OTHER | Facility: OTHER | Age: 33
End: 2023-07-29
Payer: MEDICAID

## 2023-07-29 LAB — BACTERIA UR CULT: NORMAL

## 2023-08-12 ENCOUNTER — PATIENT MESSAGE (OUTPATIENT)
Dept: OTHER | Facility: OTHER | Age: 33
End: 2023-08-12
Payer: MEDICAID

## 2023-08-24 ENCOUNTER — ROUTINE PRENATAL (OUTPATIENT)
Dept: OBSTETRICS AND GYNECOLOGY | Facility: CLINIC | Age: 33
End: 2023-08-24
Payer: MEDICAID

## 2023-08-24 VITALS
BODY MASS INDEX: 33.75 KG/M2 | DIASTOLIC BLOOD PRESSURE: 73 MMHG | WEIGHT: 202.81 LBS | SYSTOLIC BLOOD PRESSURE: 107 MMHG

## 2023-08-24 DIAGNOSIS — Z3A.29 29 WEEKS GESTATION OF PREGNANCY: ICD-10-CM

## 2023-08-24 DIAGNOSIS — Z34.83 ENCOUNTER FOR SUPERVISION OF OTHER NORMAL PREGNANCY IN THIRD TRIMESTER: Primary | ICD-10-CM

## 2023-08-24 DIAGNOSIS — R30.0 DYSURIA DURING PREGNANCY IN THIRD TRIMESTER: ICD-10-CM

## 2023-08-24 DIAGNOSIS — O26.893 DYSURIA DURING PREGNANCY IN THIRD TRIMESTER: ICD-10-CM

## 2023-08-24 DIAGNOSIS — Z23 NEED FOR TDAP VACCINATION: Primary | ICD-10-CM

## 2023-08-24 PROCEDURE — 99999 PR PBB SHADOW E&M-EST. PATIENT-LVL II: ICD-10-PCS | Mod: PBBFAC,,, | Performed by: OBSTETRICS & GYNECOLOGY

## 2023-08-24 PROCEDURE — 99213 PR OFFICE/OUTPT VISIT, EST, LEVL III, 20-29 MIN: ICD-10-PCS | Mod: TH,S$PBB,, | Performed by: OBSTETRICS & GYNECOLOGY

## 2023-08-24 PROCEDURE — 99999 PR PBB SHADOW E&M-EST. PATIENT-LVL II: CPT | Mod: PBBFAC,,, | Performed by: OBSTETRICS & GYNECOLOGY

## 2023-08-24 PROCEDURE — 99213 OFFICE O/P EST LOW 20 MIN: CPT | Mod: TH,S$PBB,, | Performed by: OBSTETRICS & GYNECOLOGY

## 2023-08-24 PROCEDURE — 99212 OFFICE O/P EST SF 10 MIN: CPT | Mod: PBBFAC,TH,PO | Performed by: OBSTETRICS & GYNECOLOGY

## 2023-08-24 PROCEDURE — 87086 URINE CULTURE/COLONY COUNT: CPT | Performed by: OBSTETRICS & GYNECOLOGY

## 2023-08-24 RX ORDER — CEPHALEXIN 500 MG/1
500 CAPSULE ORAL EVERY 12 HOURS
Qty: 20 CAPSULE | Refills: 0 | Status: SHIPPED | OUTPATIENT
Start: 2023-08-24 | End: 2023-09-03

## 2023-08-24 NOTE — PROGRESS NOTES
Pain with urination/cramping. Urine cx sent.  Reports dry face/skin    34 yo  female @ 29.5 wks by 12.3 wk sonbriseida (EDC 2023) who presents for OB care.      1) SIUP (it's a boy NOT named Felice Dash - albert Brar)  -suboptimal anatomy  -rh positive  -mat 21 neg  -consents for rLTCS/BTL signed 2023  -medicaid tubal form signed 2023  -wants circ  -1 hr gtt wnl    2) h/o LTCS x 2; wants repeat    3) PP  Wants BTL    tdap scheduled  Ok with delivery on 10/31    S MD lm

## 2023-08-26 ENCOUNTER — PATIENT MESSAGE (OUTPATIENT)
Dept: OTHER | Facility: OTHER | Age: 33
End: 2023-08-26
Payer: MEDICAID

## 2023-08-26 LAB — BACTERIA UR CULT: NORMAL

## 2023-09-09 ENCOUNTER — PATIENT MESSAGE (OUTPATIENT)
Dept: OTHER | Facility: OTHER | Age: 33
End: 2023-09-09
Payer: MEDICAID

## 2023-09-12 ENCOUNTER — ROUTINE PRENATAL (OUTPATIENT)
Dept: OBSTETRICS AND GYNECOLOGY | Facility: CLINIC | Age: 33
End: 2023-09-12
Payer: MEDICAID

## 2023-09-12 ENCOUNTER — CLINICAL SUPPORT (OUTPATIENT)
Dept: OBSTETRICS AND GYNECOLOGY | Facility: CLINIC | Age: 33
End: 2023-09-12
Payer: MEDICAID

## 2023-09-12 VITALS
WEIGHT: 206.81 LBS | DIASTOLIC BLOOD PRESSURE: 76 MMHG | BODY MASS INDEX: 34.41 KG/M2 | SYSTOLIC BLOOD PRESSURE: 120 MMHG

## 2023-09-12 DIAGNOSIS — Z3A.32 32 WEEKS GESTATION OF PREGNANCY: ICD-10-CM

## 2023-09-12 DIAGNOSIS — R30.0 DYSURIA: ICD-10-CM

## 2023-09-12 DIAGNOSIS — Z34.83 ENCOUNTER FOR SUPERVISION OF OTHER NORMAL PREGNANCY IN THIRD TRIMESTER: Primary | ICD-10-CM

## 2023-09-12 DIAGNOSIS — Z28.39 IMMUNIZATION DEFICIENCY: Primary | ICD-10-CM

## 2023-09-12 PROCEDURE — 99999PBSHW TDAP VACCINE GREATER THAN OR EQUAL TO 7YO IM: Mod: PBBFAC,,,

## 2023-09-12 PROCEDURE — 99999 PR PBB SHADOW E&M-EST. PATIENT-LVL I: ICD-10-PCS | Mod: PBBFAC,,,

## 2023-09-12 PROCEDURE — 99212 OFFICE O/P EST SF 10 MIN: CPT | Mod: PBBFAC,TH,PO | Performed by: OBSTETRICS & GYNECOLOGY

## 2023-09-12 PROCEDURE — 90471 IMMUNIZATION ADMIN: CPT | Mod: PBBFAC,PO

## 2023-09-12 PROCEDURE — 87086 URINE CULTURE/COLONY COUNT: CPT | Performed by: OBSTETRICS & GYNECOLOGY

## 2023-09-12 PROCEDURE — 99213 OFFICE O/P EST LOW 20 MIN: CPT | Mod: TH,S$PBB,, | Performed by: OBSTETRICS & GYNECOLOGY

## 2023-09-12 PROCEDURE — 99999 PR PBB SHADOW E&M-EST. PATIENT-LVL II: ICD-10-PCS | Mod: PBBFAC,,, | Performed by: OBSTETRICS & GYNECOLOGY

## 2023-09-12 PROCEDURE — 99213 PR OFFICE/OUTPT VISIT, EST, LEVL III, 20-29 MIN: ICD-10-PCS | Mod: TH,S$PBB,, | Performed by: OBSTETRICS & GYNECOLOGY

## 2023-09-12 PROCEDURE — 99999 PR PBB SHADOW E&M-EST. PATIENT-LVL II: CPT | Mod: PBBFAC,,, | Performed by: OBSTETRICS & GYNECOLOGY

## 2023-09-12 PROCEDURE — 99999 PR PBB SHADOW E&M-EST. PATIENT-LVL I: CPT | Mod: PBBFAC,,,

## 2023-09-12 PROCEDURE — 99999PBSHW TDAP VACCINE GREATER THAN OR EQUAL TO 7YO IM: ICD-10-PCS | Mod: PBBFAC,,,

## 2023-09-12 PROCEDURE — 99211 OFF/OP EST MAY X REQ PHY/QHP: CPT | Mod: PBBFAC,27,PO

## 2023-09-12 PROCEDURE — 90715 TDAP VACCINE 7 YRS/> IM: CPT | Mod: PBBFAC,PO

## 2023-09-12 RX ORDER — CEPHALEXIN 500 MG/1
500 CAPSULE ORAL EVERY 12 HOURS
Qty: 20 CAPSULE | Refills: 0 | Status: SHIPPED | OUTPATIENT
Start: 2023-09-12 | End: 2023-09-22

## 2023-09-12 NOTE — PROGRESS NOTES
Pain with urination/cramping. Urine cx sent again.  Continues to Report dry face/skin  Growth US ordered    34 yo  female @32.3 wks by 12.3 wk sono (EDC 2023) who presents for OB care.      1) SIUP (it's a boy NOT named Felice  - albert Brar)  -suboptimal anatomy  -rh positive  -mat 21 neg  -consents for rLTCS/BTL signed 2023  -medicaid tubal form signed 2023  -wants circ  -1 hr gtt wnl    2) h/o LTCS x 2; wants repeat    3) PP  Wants BTL  S/p tdap     Ok with delivery on 10/31    S MD lm

## 2023-09-12 NOTE — PROGRESS NOTES
Tdap  Administered IM to left deltoid.Tolerated well, no redness or swelling. Patient instructed to stay in office for 15 minutes in case of reaction. Patient verbalizes understanding.

## 2023-09-14 LAB — BACTERIA UR CULT: NORMAL

## 2023-09-30 ENCOUNTER — PATIENT MESSAGE (OUTPATIENT)
Dept: OTHER | Facility: OTHER | Age: 33
End: 2023-09-30
Payer: MEDICAID

## 2023-10-03 ENCOUNTER — ROUTINE PRENATAL (OUTPATIENT)
Dept: OBSTETRICS AND GYNECOLOGY | Facility: CLINIC | Age: 33
End: 2023-10-03
Payer: MEDICAID

## 2023-10-03 ENCOUNTER — PROCEDURE VISIT (OUTPATIENT)
Dept: MATERNAL FETAL MEDICINE | Facility: CLINIC | Age: 33
End: 2023-10-03
Payer: MEDICAID

## 2023-10-03 VITALS
SYSTOLIC BLOOD PRESSURE: 120 MMHG | BODY MASS INDEX: 35.26 KG/M2 | DIASTOLIC BLOOD PRESSURE: 72 MMHG | WEIGHT: 211.88 LBS

## 2023-10-03 DIAGNOSIS — Z34.83 ENCOUNTER FOR SUPERVISION OF OTHER NORMAL PREGNANCY IN THIRD TRIMESTER: ICD-10-CM

## 2023-10-03 DIAGNOSIS — Z3A.32 32 WEEKS GESTATION OF PREGNANCY: ICD-10-CM

## 2023-10-03 DIAGNOSIS — Z34.83 ENCOUNTER FOR SUPERVISION OF OTHER NORMAL PREGNANCY IN THIRD TRIMESTER: Primary | ICD-10-CM

## 2023-10-03 DIAGNOSIS — Z3A.35 35 WEEKS GESTATION OF PREGNANCY: ICD-10-CM

## 2023-10-03 PROCEDURE — 99999 PR PBB SHADOW E&M-EST. PATIENT-LVL II: CPT | Mod: PBBFAC,,, | Performed by: OBSTETRICS & GYNECOLOGY

## 2023-10-03 PROCEDURE — 99213 PR OFFICE/OUTPT VISIT, EST, LEVL III, 20-29 MIN: ICD-10-PCS | Mod: TH,S$PBB,, | Performed by: OBSTETRICS & GYNECOLOGY

## 2023-10-03 PROCEDURE — 99213 OFFICE O/P EST LOW 20 MIN: CPT | Mod: TH,S$PBB,, | Performed by: OBSTETRICS & GYNECOLOGY

## 2023-10-03 PROCEDURE — 87147 CULTURE TYPE IMMUNOLOGIC: CPT | Performed by: OBSTETRICS & GYNECOLOGY

## 2023-10-03 PROCEDURE — 76816 US OB/GYN PROCEDURE (VIEWPOINT): ICD-10-PCS | Mod: 26,S$PBB,, | Performed by: OBSTETRICS & GYNECOLOGY

## 2023-10-03 PROCEDURE — 87081 CULTURE SCREEN ONLY: CPT | Performed by: OBSTETRICS & GYNECOLOGY

## 2023-10-03 PROCEDURE — 76816 OB US FOLLOW-UP PER FETUS: CPT | Mod: PBBFAC,PO | Performed by: OBSTETRICS & GYNECOLOGY

## 2023-10-03 PROCEDURE — 99212 OFFICE O/P EST SF 10 MIN: CPT | Mod: PBBFAC,TH,PO | Performed by: OBSTETRICS & GYNECOLOGY

## 2023-10-03 PROCEDURE — 99999 PR PBB SHADOW E&M-EST. PATIENT-LVL II: ICD-10-PCS | Mod: PBBFAC,,, | Performed by: OBSTETRICS & GYNECOLOGY

## 2023-10-03 NOTE — PROGRESS NOTES
Having painful karson ku ctxs.  Having vaginal discharge. On speculum exam, white discharge noted.  Growth scan for 10/3 shows vertex presentation  Cervix: closed.    32 yo  female @35.3 wks by 12.3 wk sono (EDC 2023) who presents for OB care.      1) SIUP (it's a boy - Maykel)  -suboptimal anatomy  -rh positive  -mat 21 neg  -consents for rLTCS/BTL signed 2023  -medicaid tubal form signed 2023  -wants circ  -1 hr gtt wnl  -gbs today    2) h/o LTCS x 2; wants repeat    3) PP  Wants BTL  S/p tdap     Ok with delivery on 10/31    S MD lm

## 2023-10-05 LAB — BACTERIA SPEC AEROBE CULT: ABNORMAL

## 2023-10-17 ENCOUNTER — ROUTINE PRENATAL (OUTPATIENT)
Dept: OBSTETRICS AND GYNECOLOGY | Facility: CLINIC | Age: 33
End: 2023-10-17
Payer: MEDICAID

## 2023-10-17 ENCOUNTER — LAB VISIT (OUTPATIENT)
Dept: LAB | Facility: HOSPITAL | Age: 33
End: 2023-10-17
Attending: OBSTETRICS & GYNECOLOGY
Payer: MEDICAID

## 2023-10-17 VITALS — DIASTOLIC BLOOD PRESSURE: 88 MMHG | WEIGHT: 212.5 LBS | SYSTOLIC BLOOD PRESSURE: 122 MMHG | BODY MASS INDEX: 35.37 KG/M2

## 2023-10-17 DIAGNOSIS — Z3A.37 37 WEEKS GESTATION OF PREGNANCY: ICD-10-CM

## 2023-10-17 DIAGNOSIS — Z34.83 ENCOUNTER FOR SUPERVISION OF OTHER NORMAL PREGNANCY IN THIRD TRIMESTER: ICD-10-CM

## 2023-10-17 DIAGNOSIS — Z34.83 ENCOUNTER FOR SUPERVISION OF OTHER NORMAL PREGNANCY IN THIRD TRIMESTER: Primary | ICD-10-CM

## 2023-10-17 DIAGNOSIS — Z3A.35 35 WEEKS GESTATION OF PREGNANCY: ICD-10-CM

## 2023-10-17 DIAGNOSIS — Z3A.21 21 WEEKS GESTATION OF PREGNANCY: ICD-10-CM

## 2023-10-17 DIAGNOSIS — Z34.82 ENCOUNTER FOR SUPERVISION OF OTHER NORMAL PREGNANCY IN SECOND TRIMESTER: ICD-10-CM

## 2023-10-17 LAB
BASOPHILS # BLD AUTO: 0.04 K/UL (ref 0–0.2)
BASOPHILS # BLD AUTO: 0.04 K/UL (ref 0–0.2)
BASOPHILS NFR BLD: 0.5 % (ref 0–1.9)
BASOPHILS NFR BLD: 0.5 % (ref 0–1.9)
DIFFERENTIAL METHOD: ABNORMAL
DIFFERENTIAL METHOD: ABNORMAL
EOSINOPHIL # BLD AUTO: 0.1 K/UL (ref 0–0.5)
EOSINOPHIL # BLD AUTO: 0.1 K/UL (ref 0–0.5)
EOSINOPHIL NFR BLD: 0.7 % (ref 0–8)
EOSINOPHIL NFR BLD: 0.7 % (ref 0–8)
ERYTHROCYTE [DISTWIDTH] IN BLOOD BY AUTOMATED COUNT: 13.1 % (ref 11.5–14.5)
ERYTHROCYTE [DISTWIDTH] IN BLOOD BY AUTOMATED COUNT: 13.1 % (ref 11.5–14.5)
HCT VFR BLD AUTO: 33.7 % (ref 37–48.5)
HCT VFR BLD AUTO: 33.7 % (ref 37–48.5)
HGB BLD-MCNC: 12.1 G/DL (ref 12–16)
HGB BLD-MCNC: 12.1 G/DL (ref 12–16)
HIV 1+2 AB+HIV1 P24 AG SERPL QL IA: NORMAL
IMM GRANULOCYTES # BLD AUTO: 0.18 K/UL (ref 0–0.04)
IMM GRANULOCYTES # BLD AUTO: 0.18 K/UL (ref 0–0.04)
IMM GRANULOCYTES NFR BLD AUTO: 2.2 % (ref 0–0.5)
IMM GRANULOCYTES NFR BLD AUTO: 2.2 % (ref 0–0.5)
LYMPHOCYTES # BLD AUTO: 1.4 K/UL (ref 1–4.8)
LYMPHOCYTES # BLD AUTO: 1.4 K/UL (ref 1–4.8)
LYMPHOCYTES NFR BLD: 18 % (ref 18–48)
LYMPHOCYTES NFR BLD: 18 % (ref 18–48)
MCH RBC QN AUTO: 28.9 PG (ref 27–31)
MCH RBC QN AUTO: 28.9 PG (ref 27–31)
MCHC RBC AUTO-ENTMCNC: 35.9 G/DL (ref 32–36)
MCHC RBC AUTO-ENTMCNC: 35.9 G/DL (ref 32–36)
MCV RBC AUTO: 81 FL (ref 82–98)
MCV RBC AUTO: 81 FL (ref 82–98)
MONOCYTES # BLD AUTO: 0.8 K/UL (ref 0.3–1)
MONOCYTES # BLD AUTO: 0.8 K/UL (ref 0.3–1)
MONOCYTES NFR BLD: 9.7 % (ref 4–15)
MONOCYTES NFR BLD: 9.7 % (ref 4–15)
NEUTROPHILS # BLD AUTO: 5.5 K/UL (ref 1.8–7.7)
NEUTROPHILS # BLD AUTO: 5.5 K/UL (ref 1.8–7.7)
NEUTROPHILS NFR BLD: 68.9 % (ref 38–73)
NEUTROPHILS NFR BLD: 68.9 % (ref 38–73)
NRBC BLD-RTO: 0 /100 WBC
NRBC BLD-RTO: 0 /100 WBC
PLATELET # BLD AUTO: 217 K/UL (ref 150–450)
PLATELET # BLD AUTO: 217 K/UL (ref 150–450)
PMV BLD AUTO: 9.3 FL (ref 9.2–12.9)
PMV BLD AUTO: 9.3 FL (ref 9.2–12.9)
RBC # BLD AUTO: 4.18 M/UL (ref 4–5.4)
RBC # BLD AUTO: 4.18 M/UL (ref 4–5.4)
WBC # BLD AUTO: 8.02 K/UL (ref 3.9–12.7)
WBC # BLD AUTO: 8.02 K/UL (ref 3.9–12.7)

## 2023-10-17 PROCEDURE — 85025 COMPLETE CBC W/AUTO DIFF WBC: CPT | Performed by: OBSTETRICS & GYNECOLOGY

## 2023-10-17 PROCEDURE — 87389 HIV-1 AG W/HIV-1&-2 AB AG IA: CPT | Performed by: OBSTETRICS & GYNECOLOGY

## 2023-10-17 PROCEDURE — 86592 SYPHILIS TEST NON-TREP QUAL: CPT | Performed by: OBSTETRICS & GYNECOLOGY

## 2023-10-17 PROCEDURE — 99213 PR OFFICE/OUTPT VISIT, EST, LEVL III, 20-29 MIN: ICD-10-PCS | Mod: TH,S$PBB,, | Performed by: OBSTETRICS & GYNECOLOGY

## 2023-10-17 PROCEDURE — 99212 OFFICE O/P EST SF 10 MIN: CPT | Mod: PBBFAC,TH,PO | Performed by: OBSTETRICS & GYNECOLOGY

## 2023-10-17 PROCEDURE — 99999 PR PBB SHADOW E&M-EST. PATIENT-LVL II: CPT | Mod: PBBFAC,,, | Performed by: OBSTETRICS & GYNECOLOGY

## 2023-10-17 PROCEDURE — 36415 COLL VENOUS BLD VENIPUNCTURE: CPT | Performed by: OBSTETRICS & GYNECOLOGY

## 2023-10-17 PROCEDURE — 99213 OFFICE O/P EST LOW 20 MIN: CPT | Mod: TH,S$PBB,, | Performed by: OBSTETRICS & GYNECOLOGY

## 2023-10-17 PROCEDURE — 99999 PR PBB SHADOW E&M-EST. PATIENT-LVL II: ICD-10-PCS | Mod: PBBFAC,,, | Performed by: OBSTETRICS & GYNECOLOGY

## 2023-10-17 NOTE — PROGRESS NOTES
Having painful karson ku ctxs.  Having vaginal discharge. On speculum exam, white discharge noted.  Growth scan for 10/3 shows vertex presentation  Cervix: closed.    34 yo  female @35.3 wks by 12.3 wk sono (EDC 2023) who presents for OB care.      1) SIUP (it's a boy - Maykel)  -suboptimal anatomy  -rh positive  -mat 21 neg  -consents for rLTCS/BTL signed 2023  -medicaid tubal form signed 2023  -wants circ  -1 hr gtt wnl  -gbs today    2) h/o LTCS x 2; wants repeat    3) PP  Wants BTL  S/p tdap     Ok with delivery on 10/31    S MD lm

## 2023-10-17 NOTE — PROGRESS NOTES
Great fetal movement. Infrequent ctxs. No lof. No vb.  Growth scan for 10/3 shows vertex presentation  Cervix: closed.    34 yo  female @373 wks by 12.3 wk sono (EDC 2023) who presents for OB care.      1) SIUP (it's a boy - Maykel)  -suboptimal anatomy  -rh positive  -mat 21 neg  -consents for rLTCS/BTL signed 2023  -medicaid tubal form signed 2023  -wants circ  -1 hr gtt wnl  -gbs pos    2) h/o LTCS x 2; wants repeat    3) PP  Wants BTL  S/p tdap     Ok with delivery on 10/31    S MD lm

## 2023-10-18 LAB — RPR SER QL: NORMAL

## 2023-10-25 ENCOUNTER — ROUTINE PRENATAL (OUTPATIENT)
Dept: OBSTETRICS AND GYNECOLOGY | Facility: CLINIC | Age: 33
End: 2023-10-25
Payer: MEDICAID

## 2023-10-25 VITALS
BODY MASS INDEX: 35.84 KG/M2 | WEIGHT: 215.38 LBS | SYSTOLIC BLOOD PRESSURE: 112 MMHG | DIASTOLIC BLOOD PRESSURE: 75 MMHG

## 2023-10-25 DIAGNOSIS — Z98.891 S/P CESAREAN SECTION: ICD-10-CM

## 2023-10-25 DIAGNOSIS — Z34.83 ENCOUNTER FOR SUPERVISION OF OTHER NORMAL PREGNANCY IN THIRD TRIMESTER: Primary | ICD-10-CM

## 2023-10-25 DIAGNOSIS — Z3A.38 38 WEEKS GESTATION OF PREGNANCY: ICD-10-CM

## 2023-10-25 PROCEDURE — 99999 PR PBB SHADOW E&M-EST. PATIENT-LVL III: CPT | Mod: PBBFAC,,, | Performed by: OBSTETRICS & GYNECOLOGY

## 2023-10-25 PROCEDURE — 99213 PR OFFICE/OUTPT VISIT, EST, LEVL III, 20-29 MIN: ICD-10-PCS | Mod: TH,S$PBB,, | Performed by: OBSTETRICS & GYNECOLOGY

## 2023-10-25 PROCEDURE — 99999 PR PBB SHADOW E&M-EST. PATIENT-LVL III: ICD-10-PCS | Mod: PBBFAC,,, | Performed by: OBSTETRICS & GYNECOLOGY

## 2023-10-25 PROCEDURE — 99213 OFFICE O/P EST LOW 20 MIN: CPT | Mod: PBBFAC,TH,PO | Performed by: OBSTETRICS & GYNECOLOGY

## 2023-10-25 PROCEDURE — 99213 OFFICE O/P EST LOW 20 MIN: CPT | Mod: TH,S$PBB,, | Performed by: OBSTETRICS & GYNECOLOGY

## 2023-10-25 RX ORDER — OXYTOCIN/RINGER'S LACTATE 30/500 ML
95 PLASTIC BAG, INJECTION (ML) INTRAVENOUS ONCE
Status: CANCELLED | OUTPATIENT
Start: 2023-10-25 | End: 2023-10-25

## 2023-10-25 RX ORDER — CEFAZOLIN SODIUM 2 G/50ML
2 SOLUTION INTRAVENOUS
Status: CANCELLED | OUTPATIENT
Start: 2023-10-25

## 2023-10-25 RX ORDER — MUPIROCIN 20 MG/G
OINTMENT TOPICAL
Status: CANCELLED | OUTPATIENT
Start: 2023-10-25

## 2023-10-25 RX ORDER — FAMOTIDINE 10 MG/ML
20 INJECTION INTRAVENOUS
Status: CANCELLED | OUTPATIENT
Start: 2023-10-25

## 2023-10-25 RX ORDER — OXYTOCIN/RINGER'S LACTATE 30/500 ML
334 PLASTIC BAG, INJECTION (ML) INTRAVENOUS ONCE
Status: CANCELLED | OUTPATIENT
Start: 2023-10-25 | End: 2023-10-25

## 2023-10-25 RX ORDER — MISOPROSTOL 200 UG/1
800 TABLET ORAL ONCE AS NEEDED
Status: CANCELLED | OUTPATIENT
Start: 2023-10-25 | End: 2035-03-23

## 2023-10-25 RX ORDER — SODIUM CHLORIDE, SODIUM LACTATE, POTASSIUM CHLORIDE, CALCIUM CHLORIDE 600; 310; 30; 20 MG/100ML; MG/100ML; MG/100ML; MG/100ML
INJECTION, SOLUTION INTRAVENOUS CONTINUOUS
Status: CANCELLED | OUTPATIENT
Start: 2023-10-25

## 2023-10-25 RX ORDER — METHYLERGONOVINE MALEATE 0.2 MG/ML
200 INJECTION INTRAVENOUS ONCE AS NEEDED
Status: CANCELLED | OUTPATIENT
Start: 2023-10-25 | End: 2035-03-23

## 2023-10-25 NOTE — PROGRESS NOTES
Great fetal movement. Infrequent ctxs. No lof. No vb.  Growth scan for 10/3 shows vertex presentation  Cervical exam deferred today.      32 yo  female @38.4 wks by 12.3 wk sono (EDC 2023) who presents for OB care.      1) SIUP (it's a boy - Maykel)  -suboptimal anatomy  -rh positive  -mat 21 neg  -consents for rLTCS/BTL signed 2023  -medicaid tubal form signed 2023  -wants circ  -1 hr gtt wnl  -gbs pos    2) h/o LTCS x 2; wants repeat    3) PP  Wants BTL  S/p tdap     Ok with delivery on 10/31    S MD lm         Floor

## 2023-10-30 ENCOUNTER — ANESTHESIA EVENT (OUTPATIENT)
Dept: OBSTETRICS AND GYNECOLOGY | Facility: HOSPITAL | Age: 33
End: 2023-10-30
Payer: MEDICAID

## 2023-10-30 NOTE — ANESTHESIA PREPROCEDURE EVALUATION
Demario Christine is a 33 y.o. female     OB History    Para Term  AB Living   4 2 2 0 1 2   SAB IAB Ectopic Multiple Live Births   0 1 0 0 2      # Outcome Date GA Lbr Todd/2nd Weight Sex Delivery Anes PTL Lv   4 Current            3 Term 22 39w0d  3.4 kg (7 lb 7.9 oz) F CS-LTranv  N MIKE   2 IAB 21 17w3d     OTHER N    1 Term 20 39w4d  2.785 kg (6 lb 2.2 oz) M CS-LTranv Spinal, EPI N MIKE      Complications: Fetal Intolerance, Failure to Progress in First Stage       Wt Readings from Last 1 Encounters:   10/25/23 1615 97.7 kg (215 lb 6.2 oz)       BP Readings from Last 3 Encounters:   10/25/23 112/75   10/17/23 122/88   10/03/23 120/72       Patient Active Problem List   Diagnosis    Migraine headache    Anxiety    Refractive error    Chalazion of right upper eyelid    S/P  section       Past Surgical History:   Procedure Laterality Date     SECTION N/A 2020    Procedure:  SECTION;  Surgeon: Rae Jimenez MD;  Location: [facility] L&D;  Service: OB/GYN;  Laterality: N/A;     SECTION N/A 2022    Procedure: DELIVERY-CEASAREAN SECTION;  Surgeon: Rae Jimenez MD;  Location: [facility] L&D;  Service: OB/GYN;  Laterality: N/A;    COLONOSCOPY N/A 5/15/2017    Procedure: COLONOSCOPY;  Surgeon: Errol Hernandez MD;  Location: [address];  Service: Endoscopy;  Laterality: N/A;       Social History     Socioeconomic History    Marital status: Significant Other   Occupational History    Occupation:      Employer: [employer]    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   Tobacco Use    Smoking status: Former    Smokeless tobacco: Never   Substance and Sexual Activity    Alcohol use: Not Currently     Comment: socially    Drug use: No    Sexual activity: Yes     Partners: Male     Birth control/protection: Condom      Social Determinants of Health     Financial Resource Strain: Low Risk  (10/31/2023)    Overall Financial Resource Strain (CARDIA)     Difficulty of Paying Living Expenses: Not hard at all   Food Insecurity: No Food Insecurity (10/31/2023)    Hunger Vital Sign     Worried About Running Out of Food in the Last Year: Never true     Ran Out of Food in the Last Year: Never true   Transportation Needs: No Transportation Needs (10/31/2023)    PRAPARE - Transportation     Lack of Transportation (Medical): No     Lack of Transportation (Non-Medical): No   Physical Activity: Insufficiently Active (10/31/2023)    Exercise Vital Sign     Days of Exercise per Week: 4 days     Minutes of Exercise per Session: 20 min   Stress: Stress Concern Present (10/31/2023)    Citizen of [place] of Occupational Health - Occupational Stress Questionnaire     Feeling of Stress : Very much   Social Connections: Unknown (10/31/2023)    Social Connection and Isolation Panel [NHANES]     Frequency of Communication with Friends and Family: Three times a week     Frequency of Social Gatherings with Friends and Family: Once a week     Active Member of Clubs or Organizations: No     Attends Club or Organization Meetings: Patient refused     Marital Status: Living with partner   Housing Stability: Low Risk  (10/31/2023)    Housing Stability Vital Sign     Unable to Pay for Housing in the Last Year: No     Number of Places Lived in the Last Year: 1     Unstable Housing in the Last Year: No         Chemistry        Component Value Date/Time     04/13/2021 1701    K 4.1 04/13/2021 1701     04/13/2021 1701    CO2 22 (L) 04/13/2021 1701    BUN 6 04/13/2021 1701    CREATININE 0.7 04/13/2021 1701    GLU 78 04/13/2021 1701        Component Value Date/Time    CALCIUM 9.5 04/13/2021 1701    ALKPHOS 72 04/13/2021 1701    AST 15 04/13/2021 1701    ALT 9 (L) 04/13/2021 1701    BILITOT 0.6 04/13/2021 1701    ESTGFRAFRICA >60 04/13/2021 1701     "EGFRNONAA >60 04/13/2021 1701            Lab Results   Component Value Date    WBC 7.06 10/31/2023    HGB 11.9 (L) 10/31/2023    HCT 33.6 (L) 10/31/2023    MCV 81 (L) 10/31/2023     10/31/2023       No results for input(s): "PT", "INR", "PROTIME", "APTT" in the last 72 hours.                  Pre-op Assessment    I have reviewed the Patient Summary Reports.     I have reviewed the Nursing Notes. I have reviewed the NPO Status.   I have reviewed the Medications.     Review of Systems  Anesthesia Hx:  No problems with previous Anesthesia   History of prior surgery of interest to airway management or planning:            Denies Personal Hx of Anesthesia complications.                    Social:  Non-Smoker, Social Alcohol Use       Cardiovascular:  Cardiovascular Normal Exercise tolerance: good    Denies Hypertension.   Denies MI.                                         Pulmonary:     Denies Asthma.                    Renal/:  Renal/ Normal  Denies Chronic Renal Disease.                Hepatic/GI:     GERD Denies Liver Disease.            OB/GYN/PEDS:  First C section for Pre-E / fetal intolerance of labor / failure of dilation.            Neurological:      Headaches                                 Endocrine:  Denies Diabetes. Denies Hypothyroidism.  Denies Hyperthyroidism. BMI 35.8       Obesity / BMI > 30  Psych:   anxiety                 Physical Exam  General: Well nourished, Cooperative, Alert and Oriented    Airway:  Mallampati: II / II  Mouth Opening: Normal    Dental:  Intact    Chest/Lungs:  Clear to auscultation, Normal Respiratory Rate    Heart:  Rate: Normal  Rhythm: Regular Rhythm        Anesthesia Plan  Type of Anesthesia, risks & benefits discussed:    Anesthesia Type: Epidural, Spinal, CSE  Intra-op Monitoring Plan: Standard ASA Monitors  Post Op Pain Control Plan: multimodal analgesia, IV/PO Opioids PRN, epidural analgesia and intrathecal opioid  Induction:  IV  Informed Consent: Informed " consent signed with the Patient and all parties understand the risks and agree with anesthesia plan.  All questions answered. Patient consented to blood products? Yes  ASA Score: 2  Day of Surgery Review of History & Physical: H&P Update referred to the surgeon/provider.    Ready For Surgery From Anesthesia Perspective.     .

## 2023-10-31 ENCOUNTER — ANESTHESIA (OUTPATIENT)
Dept: OBSTETRICS AND GYNECOLOGY | Facility: HOSPITAL | Age: 33
End: 2023-10-31
Payer: MEDICAID

## 2023-10-31 ENCOUNTER — HOSPITAL ENCOUNTER (INPATIENT)
Facility: HOSPITAL | Age: 33
LOS: 2 days | Discharge: HOME | End: 2023-11-02
Attending: OBSTETRICS & GYNECOLOGY | Admitting: OBSTETRICS & GYNECOLOGY
Payer: MEDICAID

## 2023-10-31 DIAGNOSIS — Z98.51 S/P TUBAL LIGATION: ICD-10-CM

## 2023-10-31 DIAGNOSIS — Z98.891 S/P CESAREAN SECTION: Primary | ICD-10-CM

## 2023-10-31 LAB
ABO + RH BLD: NORMAL
BASOPHILS # BLD AUTO: 0.03 K/UL (ref 0–0.2)
BASOPHILS NFR BLD: 0.4 % (ref 0–1.9)
BLD GP AB SCN CELLS X3 SERPL QL: NORMAL
DIFFERENTIAL METHOD: ABNORMAL
EOSINOPHIL # BLD AUTO: 0.1 K/UL (ref 0–0.5)
EOSINOPHIL NFR BLD: 0.7 % (ref 0–8)
ERYTHROCYTE [DISTWIDTH] IN BLOOD BY AUTOMATED COUNT: 13.3 % (ref 11.5–14.5)
HCT VFR BLD AUTO: 33.6 % (ref 37–48.5)
HGB BLD-MCNC: 11.9 G/DL (ref 12–16)
IMM GRANULOCYTES # BLD AUTO: 0.08 K/UL (ref 0–0.04)
IMM GRANULOCYTES NFR BLD AUTO: 1.1 % (ref 0–0.5)
LYMPHOCYTES # BLD AUTO: 1.6 K/UL (ref 1–4.8)
LYMPHOCYTES NFR BLD: 22.2 % (ref 18–48)
MCH RBC QN AUTO: 28.7 PG (ref 27–31)
MCHC RBC AUTO-ENTMCNC: 35.4 G/DL (ref 32–36)
MCV RBC AUTO: 81 FL (ref 82–98)
MONOCYTES # BLD AUTO: 0.7 K/UL (ref 0.3–1)
MONOCYTES NFR BLD: 9.3 % (ref 4–15)
NEUTROPHILS # BLD AUTO: 4.8 K/UL (ref 1.8–7.7)
NEUTROPHILS NFR BLD: 67.4 % (ref 38–73)
NRBC BLD-RTO: 0 /100 WBC
PLATELET # BLD AUTO: 222 K/UL (ref 150–450)
PMV BLD AUTO: 9.5 FL (ref 9.2–12.9)
RBC # BLD AUTO: 4.15 M/UL (ref 4–5.4)
WBC # BLD AUTO: 7.06 K/UL (ref 3.9–12.7)

## 2023-10-31 PROCEDURE — 58611 PR LIGATION,FALLOPIAN TUBE W/C-SECTION: ICD-10-PCS | Mod: ,,, | Performed by: OBSTETRICS & GYNECOLOGY

## 2023-10-31 PROCEDURE — 59514 PRA REAN DELIVERY ONLY: ICD-10-PCS | Mod: QZ,,, | Performed by: NURSE ANESTHETIST, CERTIFIED REGISTERED

## 2023-10-31 PROCEDURE — 63600175 PHARM REV CODE 636 W HCPCS: Performed by: OBSTETRICS & GYNECOLOGY

## 2023-10-31 PROCEDURE — 25000003 PHARM REV CODE 250

## 2023-10-31 PROCEDURE — 59514 PR CESAREAN DELIVERY ONLY: ICD-10-PCS | Mod: GB,,, | Performed by: OBSTETRICS & GYNECOLOGY

## 2023-10-31 PROCEDURE — 37000009 HC ANESTHESIA EA ADD 15 MINS: Performed by: OBSTETRICS & GYNECOLOGY

## 2023-10-31 PROCEDURE — 37000008 HC ANESTHESIA 1ST 15 MINUTES: Performed by: OBSTETRICS & GYNECOLOGY

## 2023-10-31 PROCEDURE — 63600175 PHARM REV CODE 636 W HCPCS: Performed by: STUDENT IN AN ORGANIZED HEALTH CARE EDUCATION/TRAINING PROGRAM

## 2023-10-31 PROCEDURE — 27200688 HC TRAY, SPINAL-HYPER/ ISOBARIC: Performed by: STUDENT IN AN ORGANIZED HEALTH CARE EDUCATION/TRAINING PROGRAM

## 2023-10-31 PROCEDURE — 72100002 HC LABOR CARE, 1ST 8 HOURS

## 2023-10-31 PROCEDURE — 51702 INSERT TEMP BLADDER CATH: CPT

## 2023-10-31 PROCEDURE — 88302 TISSUE EXAM BY PATHOLOGIST: CPT | Performed by: PATHOLOGY

## 2023-10-31 PROCEDURE — 63600175 PHARM REV CODE 636 W HCPCS: Performed by: NURSE ANESTHETIST, CERTIFIED REGISTERED

## 2023-10-31 PROCEDURE — 36004725 HC OB OR TIME LEV III - EA ADD 15 MIN: Mod: SZN

## 2023-10-31 PROCEDURE — 58611 LIGATE OVIDUCT(S) ADD-ON: CPT | Mod: ,,, | Performed by: OBSTETRICS & GYNECOLOGY

## 2023-10-31 PROCEDURE — 88302 PR  SURG PATH,LEVEL II: ICD-10-PCS | Mod: 26,,, | Performed by: PATHOLOGY

## 2023-10-31 PROCEDURE — 36415 COLL VENOUS BLD VENIPUNCTURE: CPT | Performed by: OBSTETRICS & GYNECOLOGY

## 2023-10-31 PROCEDURE — 25000003 PHARM REV CODE 250: Performed by: OBSTETRICS & GYNECOLOGY

## 2023-10-31 PROCEDURE — 71000033 HC RECOVERY, INTIAL HOUR: Performed by: OBSTETRICS & GYNECOLOGY

## 2023-10-31 PROCEDURE — 25000003 PHARM REV CODE 250: Performed by: NURSE ANESTHETIST, CERTIFIED REGISTERED

## 2023-10-31 PROCEDURE — 36004725 HC OB OR TIME LEV III - EA ADD 15 MIN: Performed by: OBSTETRICS & GYNECOLOGY

## 2023-10-31 PROCEDURE — 11000001 HC ACUTE MED/SURG PRIVATE ROOM

## 2023-10-31 PROCEDURE — 36004724 HC OB OR TIME LEV III - 1ST 15 MIN: Performed by: OBSTETRICS & GYNECOLOGY

## 2023-10-31 PROCEDURE — 71000039 HC RECOVERY, EACH ADD'L HOUR: Performed by: OBSTETRICS & GYNECOLOGY

## 2023-10-31 PROCEDURE — 85025 COMPLETE CBC W/AUTO DIFF WBC: CPT | Performed by: OBSTETRICS & GYNECOLOGY

## 2023-10-31 PROCEDURE — 63600175 PHARM REV CODE 636 W HCPCS

## 2023-10-31 PROCEDURE — 25000003 PHARM REV CODE 250: Performed by: STUDENT IN AN ORGANIZED HEALTH CARE EDUCATION/TRAINING PROGRAM

## 2023-10-31 PROCEDURE — 59514 CESAREAN DELIVERY ONLY: CPT | Mod: GB,,, | Performed by: OBSTETRICS & GYNECOLOGY

## 2023-10-31 PROCEDURE — 59514 CESAREAN DELIVERY ONLY: CPT | Mod: QZ,,, | Performed by: NURSE ANESTHETIST, CERTIFIED REGISTERED

## 2023-10-31 PROCEDURE — 37000009 HC ANESTHESIA EA ADD 15 MINS: Mod: SZN

## 2023-10-31 PROCEDURE — 86900 BLOOD TYPING SEROLOGIC ABO: CPT | Performed by: OBSTETRICS & GYNECOLOGY

## 2023-10-31 PROCEDURE — 88302 TISSUE EXAM BY PATHOLOGIST: CPT | Mod: 26,,, | Performed by: PATHOLOGY

## 2023-10-31 RX ORDER — CARBOPROST TROMETHAMINE 250 UG/ML
INJECTION, SOLUTION INTRAMUSCULAR
Status: DISPENSED
Start: 2023-10-31 | End: 2023-10-31

## 2023-10-31 RX ORDER — DIPHENHYDRAMINE HYDROCHLORIDE 50 MG/ML
12.5 INJECTION INTRAMUSCULAR; INTRAVENOUS
Status: DISCONTINUED | OUTPATIENT
Start: 2023-10-31 | End: 2023-11-02 | Stop reason: HOSPADM

## 2023-10-31 RX ORDER — SIMETHICONE 80 MG
1 TABLET,CHEWABLE ORAL EVERY 6 HOURS PRN
Status: DISCONTINUED | OUTPATIENT
Start: 2023-10-31 | End: 2023-11-02 | Stop reason: HOSPADM

## 2023-10-31 RX ORDER — MORPHINE SULFATE 1 MG/ML
INJECTION, SOLUTION EPIDURAL; INTRATHECAL; INTRAVENOUS
Status: DISCONTINUED | OUTPATIENT
Start: 2023-10-31 | End: 2023-10-31

## 2023-10-31 RX ORDER — MUPIROCIN 20 MG/G
OINTMENT TOPICAL 2 TIMES DAILY
Status: DISCONTINUED | OUTPATIENT
Start: 2023-10-31 | End: 2023-11-02 | Stop reason: HOSPADM

## 2023-10-31 RX ORDER — DEXAMETHASONE SODIUM PHOSPHATE 4 MG/ML
INJECTION, SOLUTION INTRA-ARTICULAR; INTRALESIONAL; INTRAMUSCULAR; INTRAVENOUS; SOFT TISSUE
Status: DISCONTINUED | OUTPATIENT
Start: 2023-10-31 | End: 2023-10-31

## 2023-10-31 RX ORDER — PHENYLEPHRINE HYDROCHLORIDE 10 MG/ML
INJECTION INTRAVENOUS
Status: DISCONTINUED | OUTPATIENT
Start: 2023-10-31 | End: 2023-10-31

## 2023-10-31 RX ORDER — PROCHLORPERAZINE EDISYLATE 5 MG/ML
5 INJECTION INTRAMUSCULAR; INTRAVENOUS EVERY 6 HOURS PRN
Status: DISCONTINUED | OUTPATIENT
Start: 2023-10-31 | End: 2023-11-02 | Stop reason: HOSPADM

## 2023-10-31 RX ORDER — ACETAMINOPHEN 325 MG/1
650 TABLET ORAL EVERY 6 HOURS PRN
Status: DISCONTINUED | OUTPATIENT
Start: 2023-10-31 | End: 2023-11-02 | Stop reason: HOSPADM

## 2023-10-31 RX ORDER — OXYTOCIN 10 [USP'U]/ML
10 INJECTION, SOLUTION INTRAMUSCULAR; INTRAVENOUS ONCE AS NEEDED
Status: DISCONTINUED | OUTPATIENT
Start: 2023-10-31 | End: 2023-11-02 | Stop reason: HOSPADM

## 2023-10-31 RX ORDER — OXYTOCIN/RINGER'S LACTATE 30/500 ML
334 PLASTIC BAG, INJECTION (ML) INTRAVENOUS ONCE
Status: COMPLETED | OUTPATIENT
Start: 2023-10-31 | End: 2023-10-31

## 2023-10-31 RX ORDER — TERBUTALINE SULFATE 1 MG/ML
0.25 INJECTION SUBCUTANEOUS ONCE
Status: DISCONTINUED | OUTPATIENT
Start: 2023-10-31 | End: 2023-10-31

## 2023-10-31 RX ORDER — MORPHINE SULFATE 0.5 MG/ML
INJECTION, SOLUTION EPIDURAL; INTRATHECAL; INTRAVENOUS
Status: DISCONTINUED | OUTPATIENT
Start: 2023-10-31 | End: 2023-10-31

## 2023-10-31 RX ORDER — TRANEXAMIC ACID 10 MG/ML
1000 INJECTION, SOLUTION INTRAVENOUS EVERY 30 MIN PRN
Status: DISCONTINUED | OUTPATIENT
Start: 2023-10-31 | End: 2023-11-02 | Stop reason: HOSPADM

## 2023-10-31 RX ORDER — OXYTOCIN/RINGER'S LACTATE 30/500 ML
95 PLASTIC BAG, INJECTION (ML) INTRAVENOUS ONCE
Status: DISCONTINUED | OUTPATIENT
Start: 2023-10-31 | End: 2023-10-31

## 2023-10-31 RX ORDER — DIPHENOXYLATE HYDROCHLORIDE AND ATROPINE SULFATE 2.5; .025 MG/1; MG/1
2 TABLET ORAL EVERY 6 HOURS PRN
Status: DISCONTINUED | OUTPATIENT
Start: 2023-10-31 | End: 2023-11-02 | Stop reason: HOSPADM

## 2023-10-31 RX ORDER — CEFAZOLIN SODIUM 2 G/50ML
2 SOLUTION INTRAVENOUS
Status: COMPLETED | OUTPATIENT
Start: 2023-10-31 | End: 2023-10-31

## 2023-10-31 RX ORDER — OXYTOCIN/RINGER'S LACTATE 30/500 ML
334 PLASTIC BAG, INJECTION (ML) INTRAVENOUS ONCE AS NEEDED
Status: DISCONTINUED | OUTPATIENT
Start: 2023-10-31 | End: 2023-11-02 | Stop reason: HOSPADM

## 2023-10-31 RX ORDER — ADHESIVE BANDAGE
30 BANDAGE TOPICAL 2 TIMES DAILY PRN
Status: DISCONTINUED | OUTPATIENT
Start: 2023-11-01 | End: 2023-11-02 | Stop reason: HOSPADM

## 2023-10-31 RX ORDER — KETOROLAC TROMETHAMINE 30 MG/ML
30 INJECTION, SOLUTION INTRAMUSCULAR; INTRAVENOUS EVERY 6 HOURS
Status: COMPLETED | OUTPATIENT
Start: 2023-10-31 | End: 2023-11-01

## 2023-10-31 RX ORDER — SODIUM CHLORIDE, SODIUM LACTATE, POTASSIUM CHLORIDE, CALCIUM CHLORIDE 600; 310; 30; 20 MG/100ML; MG/100ML; MG/100ML; MG/100ML
INJECTION, SOLUTION INTRAVENOUS CONTINUOUS PRN
Status: DISCONTINUED | OUTPATIENT
Start: 2023-10-31 | End: 2023-10-31

## 2023-10-31 RX ORDER — ACETAMINOPHEN 325 MG/1
650 TABLET ORAL EVERY 6 HOURS
Status: COMPLETED | OUTPATIENT
Start: 2023-10-31 | End: 2023-11-01

## 2023-10-31 RX ORDER — MISOPROSTOL 200 UG/1
800 TABLET ORAL ONCE AS NEEDED
Status: DISCONTINUED | OUTPATIENT
Start: 2023-10-31 | End: 2023-11-02 | Stop reason: HOSPADM

## 2023-10-31 RX ORDER — ONDANSETRON 2 MG/ML
INJECTION INTRAMUSCULAR; INTRAVENOUS
Status: DISCONTINUED | OUTPATIENT
Start: 2023-10-31 | End: 2023-10-31

## 2023-10-31 RX ORDER — PRENATAL WITH FERROUS FUM AND FOLIC ACID 3080; 920; 120; 400; 22; 1.84; 3; 20; 10; 1; 12; 200; 27; 25; 2 [IU]/1; [IU]/1; MG/1; [IU]/1; MG/1; MG/1; MG/1; MG/1; MG/1; MG/1; UG/1; MG/1; MG/1; MG/1; MG/1
1 TABLET ORAL DAILY
Status: DISCONTINUED | OUTPATIENT
Start: 2023-10-31 | End: 2023-11-02 | Stop reason: HOSPADM

## 2023-10-31 RX ORDER — OXYTOCIN 10 [USP'U]/ML
INJECTION, SOLUTION INTRAMUSCULAR; INTRAVENOUS
Status: DISCONTINUED
Start: 2023-10-31 | End: 2023-10-31 | Stop reason: WASHOUT

## 2023-10-31 RX ORDER — DEXMEDETOMIDINE HYDROCHLORIDE 100 UG/ML
INJECTION, SOLUTION INTRAVENOUS
Status: DISCONTINUED | OUTPATIENT
Start: 2023-10-31 | End: 2023-10-31

## 2023-10-31 RX ORDER — METHYLERGONOVINE MALEATE 0.2 MG/ML
200 INJECTION INTRAVENOUS ONCE AS NEEDED
Status: COMPLETED | OUTPATIENT
Start: 2023-10-31 | End: 2023-10-31

## 2023-10-31 RX ORDER — OXYCODONE HYDROCHLORIDE 5 MG/1
10 TABLET ORAL EVERY 4 HOURS PRN
Status: DISCONTINUED | OUTPATIENT
Start: 2023-10-31 | End: 2023-11-02 | Stop reason: HOSPADM

## 2023-10-31 RX ORDER — BISACODYL 10 MG
10 SUPPOSITORY, RECTAL RECTAL ONCE AS NEEDED
Status: DISCONTINUED | OUTPATIENT
Start: 2023-10-31 | End: 2023-11-02 | Stop reason: HOSPADM

## 2023-10-31 RX ORDER — PROCHLORPERAZINE EDISYLATE 5 MG/ML
5 INJECTION INTRAMUSCULAR; INTRAVENOUS EVERY 6 HOURS PRN
Status: DISCONTINUED | OUTPATIENT
Start: 2023-10-31 | End: 2023-10-31

## 2023-10-31 RX ORDER — NALOXONE HCL 0.4 MG/ML
0.04 VIAL (ML) INJECTION EVERY 5 MIN PRN
Status: DISCONTINUED | OUTPATIENT
Start: 2023-10-31 | End: 2023-11-02 | Stop reason: HOSPADM

## 2023-10-31 RX ORDER — FAMOTIDINE 10 MG/ML
20 INJECTION INTRAVENOUS
Status: DISCONTINUED | OUTPATIENT
Start: 2023-10-31 | End: 2023-10-31

## 2023-10-31 RX ORDER — METHYLERGONOVINE MALEATE 0.2 MG/ML
200 INJECTION INTRAVENOUS ONCE AS NEEDED
Status: DISCONTINUED | OUTPATIENT
Start: 2023-10-31 | End: 2023-11-02 | Stop reason: HOSPADM

## 2023-10-31 RX ORDER — SODIUM CHLORIDE, SODIUM LACTATE, POTASSIUM CHLORIDE, CALCIUM CHLORIDE 600; 310; 30; 20 MG/100ML; MG/100ML; MG/100ML; MG/100ML
INJECTION, SOLUTION INTRAVENOUS CONTINUOUS
Status: DISCONTINUED | OUTPATIENT
Start: 2023-10-31 | End: 2023-10-31

## 2023-10-31 RX ORDER — BUPIVACAINE HYDROCHLORIDE 7.5 MG/ML
INJECTION, SOLUTION EPIDURAL; RETROBULBAR
Status: DISCONTINUED | OUTPATIENT
Start: 2023-10-31 | End: 2023-10-31

## 2023-10-31 RX ORDER — CARBOPROST TROMETHAMINE 250 UG/ML
250 INJECTION, SOLUTION INTRAMUSCULAR
Status: DISCONTINUED | OUTPATIENT
Start: 2023-10-31 | End: 2023-11-02 | Stop reason: HOSPADM

## 2023-10-31 RX ORDER — PROMETHAZINE HYDROCHLORIDE 25 MG/ML
INJECTION, SOLUTION INTRAMUSCULAR; INTRAVENOUS
Status: DISCONTINUED | OUTPATIENT
Start: 2023-10-31 | End: 2023-10-31

## 2023-10-31 RX ORDER — OXYTOCIN/RINGER'S LACTATE 30/500 ML
95 PLASTIC BAG, INJECTION (ML) INTRAVENOUS ONCE AS NEEDED
Status: DISCONTINUED | OUTPATIENT
Start: 2023-10-31 | End: 2023-11-02 | Stop reason: HOSPADM

## 2023-10-31 RX ORDER — OXYTOCIN/RINGER'S LACTATE 30/500 ML
95 PLASTIC BAG, INJECTION (ML) INTRAVENOUS ONCE
Status: DISCONTINUED | OUTPATIENT
Start: 2023-10-31 | End: 2023-11-02 | Stop reason: HOSPADM

## 2023-10-31 RX ORDER — MUPIROCIN 20 MG/G
OINTMENT TOPICAL
Status: DISCONTINUED | OUTPATIENT
Start: 2023-10-31 | End: 2023-10-31

## 2023-10-31 RX ORDER — ONDANSETRON 2 MG/ML
4 INJECTION INTRAMUSCULAR; INTRAVENOUS EVERY 6 HOURS PRN
Status: DISCONTINUED | OUTPATIENT
Start: 2023-10-31 | End: 2023-11-02 | Stop reason: HOSPADM

## 2023-10-31 RX ORDER — MISOPROSTOL 200 UG/1
800 TABLET ORAL ONCE AS NEEDED
Status: COMPLETED | OUTPATIENT
Start: 2023-10-31 | End: 2023-10-31

## 2023-10-31 RX ORDER — OXYCODONE HYDROCHLORIDE 5 MG/1
5 TABLET ORAL EVERY 4 HOURS PRN
Status: DISCONTINUED | OUTPATIENT
Start: 2023-10-31 | End: 2023-11-02 | Stop reason: HOSPADM

## 2023-10-31 RX ORDER — KETOROLAC TROMETHAMINE 30 MG/ML
INJECTION, SOLUTION INTRAMUSCULAR; INTRAVENOUS
Status: DISCONTINUED | OUTPATIENT
Start: 2023-10-31 | End: 2023-10-31

## 2023-10-31 RX ORDER — DIPHENHYDRAMINE HCL 25 MG
25 CAPSULE ORAL EVERY 4 HOURS PRN
Status: DISCONTINUED | OUTPATIENT
Start: 2023-10-31 | End: 2023-11-02 | Stop reason: HOSPADM

## 2023-10-31 RX ORDER — SODIUM CHLORIDE 0.9 % (FLUSH) 0.9 %
10 SYRINGE (ML) INJECTION
Status: DISCONTINUED | OUTPATIENT
Start: 2023-10-31 | End: 2023-11-02 | Stop reason: HOSPADM

## 2023-10-31 RX ORDER — DOCUSATE SODIUM 100 MG/1
200 CAPSULE, LIQUID FILLED ORAL 2 TIMES DAILY
Status: DISCONTINUED | OUTPATIENT
Start: 2023-10-31 | End: 2023-11-02 | Stop reason: HOSPADM

## 2023-10-31 RX ORDER — BUPIVACAINE HYDROCHLORIDE 2.5 MG/ML
INJECTION, SOLUTION EPIDURAL; INFILTRATION; INTRACAUDAL
Status: COMPLETED
Start: 2023-10-31 | End: 2023-10-31

## 2023-10-31 RX ORDER — MIDAZOLAM HYDROCHLORIDE 1 MG/ML
INJECTION, SOLUTION INTRAMUSCULAR; INTRAVENOUS
Status: DISCONTINUED | OUTPATIENT
Start: 2023-10-31 | End: 2023-10-31

## 2023-10-31 RX ORDER — ONDANSETRON 8 MG/1
8 TABLET, ORALLY DISINTEGRATING ORAL EVERY 8 HOURS PRN
Status: DISCONTINUED | OUTPATIENT
Start: 2023-10-31 | End: 2023-11-02 | Stop reason: HOSPADM

## 2023-10-31 RX ADMIN — SODIUM CHLORIDE, SODIUM LACTATE, POTASSIUM CHLORIDE, AND CALCIUM CHLORIDE: .6; .31; .03; .02 INJECTION, SOLUTION INTRAVENOUS at 07:10

## 2023-10-31 RX ADMIN — KETOROLAC TROMETHAMINE 30 MG: 30 INJECTION, SOLUTION INTRAMUSCULAR; INTRAVENOUS at 08:10

## 2023-10-31 RX ADMIN — ONDANSETRON 4 MG: 2 INJECTION, SOLUTION INTRAMUSCULAR; INTRAVENOUS at 08:10

## 2023-10-31 RX ADMIN — KETOROLAC TROMETHAMINE 30 MG: 30 INJECTION, SOLUTION INTRAMUSCULAR; INTRAVENOUS at 03:10

## 2023-10-31 RX ADMIN — PHENYLEPHRINE HYDROCHLORIDE 100 MCG: 10 INJECTION INTRAVENOUS at 07:10

## 2023-10-31 RX ADMIN — OXYCODONE HYDROCHLORIDE 10 MG: 5 TABLET ORAL at 03:10

## 2023-10-31 RX ADMIN — OXYCODONE HYDROCHLORIDE 5 MG: 5 TABLET ORAL at 08:10

## 2023-10-31 RX ADMIN — CEFAZOLIN SODIUM 2 G: 2 SOLUTION INTRAVENOUS at 07:10

## 2023-10-31 RX ADMIN — Medication 334 ML/HR: at 08:10

## 2023-10-31 RX ADMIN — PHENYLEPHRINE HYDROCHLORIDE 100 MCG: 10 INJECTION INTRAVENOUS at 08:10

## 2023-10-31 RX ADMIN — DOCUSATE SODIUM 200 MG: 100 CAPSULE, LIQUID FILLED ORAL at 08:10

## 2023-10-31 RX ADMIN — MUPIROCIN: 20 OINTMENT TOPICAL at 08:10

## 2023-10-31 RX ADMIN — MUPIROCIN: 20 OINTMENT TOPICAL at 07:10

## 2023-10-31 RX ADMIN — BUPIVACAINE HYDROCHLORIDE 1.7 ML: 7.5 INJECTION, SOLUTION EPIDURAL; RETROBULBAR at 07:10

## 2023-10-31 RX ADMIN — MIDAZOLAM 2 MG: 1 INJECTION INTRAMUSCULAR; INTRAVENOUS at 08:10

## 2023-10-31 RX ADMIN — ACETAMINOPHEN 650 MG: 325 TABLET ORAL at 03:10

## 2023-10-31 RX ADMIN — FAMOTIDINE 20 MG: 10 INJECTION, SOLUTION INTRAVENOUS at 07:10

## 2023-10-31 RX ADMIN — MORPHINE SULFATE 0.1 MG: 0.5 INJECTION, SOLUTION EPIDURAL; INTRATHECAL; INTRAVENOUS at 07:10

## 2023-10-31 RX ADMIN — DIPHENHYDRAMINE HYDROCHLORIDE 25 MG: 25 CAPSULE ORAL at 08:10

## 2023-10-31 RX ADMIN — ACETAMINOPHEN 650 MG: 325 TABLET ORAL at 08:10

## 2023-10-31 RX ADMIN — PROMETHAZINE HYDROCHLORIDE 6.25 MG: 25 INJECTION INTRAMUSCULAR; INTRAVENOUS at 08:10

## 2023-10-31 RX ADMIN — DEXAMETHASONE SODIUM PHOSPHATE 4 MG: 4 INJECTION, SOLUTION INTRA-ARTICULAR; INTRALESIONAL; INTRAMUSCULAR; INTRAVENOUS; SOFT TISSUE at 08:10

## 2023-10-31 RX ADMIN — DEXMEDETOMIDINE HCL 5 MCG: 100 INJECTION INTRAVENOUS at 07:10

## 2023-10-31 RX ADMIN — SODIUM CHLORIDE, POTASSIUM CHLORIDE, SODIUM LACTATE AND CALCIUM CHLORIDE: 600; 310; 30; 20 INJECTION, SOLUTION INTRAVENOUS at 10:10

## 2023-10-31 NOTE — NURSING
Spoke with Dr Jimenez, notified of pt's request for pain medication, no change in SVE, pt writhing in bed, and family request to see MD, order received, pt agreed to terbutaline medication.

## 2023-10-31 NOTE — TRANSFER OF CARE
Anesthesia Transfer of Care Note    Patient: Demario Christine    Procedure(s) Performed: Procedure(s) (LRB):  DELIVERY-CEASAREAN SECTION   SECTION, WITH TUBAL LIGATION (N/A)    Patient location: Labor and Delivery    Anesthesia Type: spinal    Transport from OR: Transported from OR on room air with adequate spontaneous ventilation    Post pain: adequate analgesia    Post assessment: no apparent anesthetic complications    Post vital signs: stable    Level of consciousness: awake    Nausea/Vomiting: no nausea/vomiting    Complications: none    Transfer of care protocol was followed      Last vitals: Visit Vitals  /67   Pulse 78   Temp 36.5 °C (97.7 °F) (Oral)   Resp 18   LMP 2023   SpO2 98%   Breastfeeding No

## 2023-10-31 NOTE — NURSING
1152am=  Admitted to unit, via bed, resp even and unlabored.  IV Lt FA infusing LR at 125ml/hr, via pump, without difficulty.  Rivera patent draining clear yellow urine.  Oriented to room, surroundings, and call light.  Resting quietly with no distress.  Safety maintained with call light in reach.

## 2023-10-31 NOTE — NURSING
Pt reporting pain 6/10 to abdomen and vomiting, requested medication, Dr Jimenez notified and orders received.

## 2023-10-31 NOTE — NURSING
Pt moved to room 301 per bed, baby pushed in crib by father. Oriented to room and call bell in reach, report given to DENICE Garcia

## 2023-10-31 NOTE — PLAN OF CARE
Pt admitted for scheduled  btl, fob at bedside. Plan of care reviewed with pt and pt verbalized understanding.

## 2023-10-31 NOTE — H&P
HPI:   Demario Christine is a 33 y.o. female  at 39.3 wks EGA who presents here for rLTCS.    ROS:  GENERAL: Denies weight gain or weight loss. Feeling well overall.   SKIN: Denies rash or lesions.   HEAD: Denies head injury or headache.   CHEST: Denies chest pain or shortness of breath.   CARDIOVASCULAR: Denies palpitations or left sided chest pain.   ABDOMEN: No abdominal pain, constipation, diarrhea, nausea, vomiting or rectal bleeding.   URINARY: No frequency, dysuria, hematuria, or burning on urination.  REPRODUCTIVE: See HPI.     Past Medical History:   Diagnosis Date    Chronic diarrhea     Migraine headache      Past Surgical History:   Procedure Laterality Date     SECTION N/A 2020    Procedure:  SECTION;  Surgeon: Rae Jimenez MD;  Location: [facility];  Service: OB/GYN;  Laterality: N/A;     SECTION N/A 2022    Procedure: DELIVERY-CEASAREAN SECTION;  Surgeon: Rae Jimenez MD;  Location: [facility];  Service: OB/GYN;  Laterality: N/A;    COLONOSCOPY N/A 5/15/2017    Procedure: COLONOSCOPY;  Surgeon: Errol Hernandez MD;  Location: [facility] ([address] [place] [address]);  Service: Endoscopy;  Laterality: N/A;     Family History   Problem Relation Age of Onset    Hypertension Mother     No Known Problems Father     No Known Problems Sister     No Known Problems Brother     No Known Problems Maternal Aunt     No Known Problems Maternal Uncle     No Known Problems Paternal Aunt     No Known Problems Paternal Uncle     Cataracts Maternal Grandmother     No Known Problems Maternal Grandfather     No Known Problems Paternal Grandmother     No Known Problems Paternal Grandfather     Blindness Neg Hx     Cancer Neg Hx     Diabetes Neg Hx     Glaucoma Neg Hx     Inflammatory bowel disease Neg Hx     Stomach cancer Neg Hx     Colon cancer Neg Hx     Rectal cancer Neg Hx     Esophageal cancer Neg Hx     Crohn's disease Neg Hx     Celiac disease Neg Hx     Amblyopia Neg Hx     Macular  degeneration Neg Hx     Retinal detachment Neg Hx     Strabismus Neg Hx     Stroke Neg Hx     Thyroid disease Neg Hx      Allergies: Patient has no known allergies.       PE:    /84   Pulse 80   LMP 02/02/2023   SpO2 100%   Breastfeeding No   APPEARANCE: Well nourished, well developed, in no acute distress.  ABDOMEN:  Gravid.   SVE: defer  FHT: 110bpm, mod kj, +accels, no decels  [place]: irregular    Lab Results   Component Value Date    WBC 7.06 10/31/2023    HGB 11.9 (L) 10/31/2023    HCT 33.6 (L) 10/31/2023    MCV 81 (L) 10/31/2023     10/31/2023       B POS    Assessment:  IUP 39.3 weeks, for rLTCS/BTL    Plan:   Admit to L&D  FHT Cat 1  Plan: for rLTCS/BTL  Consents reviewed    SHERWIN Jimenez MD

## 2023-10-31 NOTE — PLAN OF CARE
VSS. Rivera discontinued with shift, and DTV.  Instructed to call nurse for assistance to get OOB.  Verbalized understanding.  Tolerating regular diet.  Reports pain relieved with ordered pain meds administered. Encouraged to feed infant 8 or more times in 24 hour period and on demand feedings.  Verbalized understanding.

## 2023-10-31 NOTE — ANESTHESIA PROCEDURE NOTES
Spinal    Diagnosis: iup, repeat csection  Patient location during procedure: OR  Start time: 10/31/2023 7:43 AM  Timeout: 10/31/2023 7:42 AM  End time: 10/31/2023 7:48 AM    Staffing  Authorizing Provider: Errol Rodriguez MD  Performing Provider: Blayne Mulligan Jr., CRNA    Staffing  Performed by: Blayne Mulligan Jr., CRNA  Authorized by: Errol Rodriguez MD    Preanesthetic Checklist  Completed: patient identified, IV checked, site marked, risks and benefits discussed, surgical consent, monitors and equipment checked, pre-op evaluation and timeout performed  Spinal Block  Patient position: sitting  Prep: ChloraPrep  Patient monitoring: heart rate, cardiac monitor and continuous pulse ox  Approach: midline  Location: L3-4  Injection technique: single shot  CSF Fluid: clear free-flowing CSF  Needle  Needle type: Frank   Needle gauge: 25 G  Needle length: 3.5 in  Additional Documentation: no paresthesia on injection and negative aspiration for heme  Needle localization: anatomical landmarks  Assessment  Sensory level: T5   Dermatomal levels determined by pinch or prick  Ease of block: easy  Patient's tolerance of the procedure: comfortable throughout block and no complaints

## 2023-10-31 NOTE — NURSING
1650pm=  discontinued Rivera as ordered.  Tolerated well.  Instructed to call nurse when has urge to void to assist to bathroom.  Verbalized understanding.

## 2023-10-31 NOTE — ANESTHESIA POSTPROCEDURE EVALUATION
Anesthesia Post Evaluation    Patient: Demario Christine    Procedure(s) Performed: Procedure(s) (LRB):   SECTION, WITH TUBAL LIGATION (N/A)    Final Anesthesia Type: spinal      Patient location during evaluation: labor & delivery  Patient participation: Yes- Able to Participate  Level of consciousness: awake and alert  Post-procedure vital signs: reviewed and stable  Pain management: adequate  Airway patency: patent    PONV status at discharge: No PONV  Anesthetic complications: no      Cardiovascular status: blood pressure returned to baseline  Respiratory status: unassisted and spontaneous ventilation  Hydration status: euvolemic  Follow-up not needed.          Vitals Value Taken Time   /71 10/31/23 1130   Temp 36.4 °C (97.6 °F) 10/31/23 0914   Pulse 70 10/31/23 1135   Resp 16 10/31/23 1100   SpO2 100 % 10/31/23 1135   Vitals shown include unvalidated device data.      No case tracking events are documented in the log.      Pain/Tanner Score: No data recorded

## 2023-10-31 NOTE — L&D DELIVERY NOTE
Operative Note    Date: 10/31/2023  Preoperative Diagnosis:  1) IUP @ 39.3 wks, h/o previous LTCS x 2, desires rLTCS  2) desires permanent sterilization    Postoperative Diagnosis: same  Procedure: s/p rLTCS/BTL  Type of Anesthesia: spinal  Surgeon: halle amato MD  Assistant Surgeon: maya abarca  Specimen/Tissue Removed:  Estimated Blood Loss: 600cc  Urine Output: clear urine at the end of procedure  IV Fluids: 700 cc LR  Complications: none      findings: viable male infant in cephalic presentation with APGARS 9 and 9; Normal appearing tubes and ovaries bilaterally.    TECHNIQUE:  The patient was taken out to the Operating Room where her **spinal/epidural** was found to be adequate.  She was then prepared and draped and in a normal sterile fashion.  A Pfannenstiel skin incision was made approximately 2 cm above the pubic bone.  Incision was started with the scalpel then taken down to underlying layer of adipose tissue to the fascia with the Bovie.   The fascia was incised in the midline then extended superiorly and laterally with the pickups and curved mayos. Superior aspect of the incision was grasped with Kocher clamps, elevated and the rectus muscles were dissected off the fascia using curved Mayos.  Similar technique was achieved in the lower abdominal incision.  The rectus muscle was then  in the midline.     Bladder blade was placed.  The vesicular peritoneum was identified and entered with pickups and entered sharply with Metzenbaums.  Bladder flap was then created digitally.   Bladder blade was replaced.  A scalpel was used to make a low transverse incision in the lower uterine segment.     The  was delivered using standard technique.  Cord was clamped and  was handed to awaiting pediatrician. Cord blood collected;    The placenta were then delivered with gentle manual traction.  The uterus was exteriorized and cleared of all clots and debris.  The uterus was   closed with 0  Vicryl suture in a running locked fashion.   Attention was turned to the patient's tubes for tubal ligation.  The left tube was identified   up to the fimbria.  It was then grasped with the Frank approximately 3 cm from the cornua.  An opening was made in the mesosalpinx with the Bovie and a   portion of the tube was tied off with 0 catgut suture.  The knuckle of the tube was then cut and sent to Pathology.  Hemostasis was noted.  Attention was then   turned to the patient's right tube, which was also identified to the fimbria.  The tube was then regrasped approximately 3 cm from the cornua.  An opening was   made in the mesosalpinx and catgut suture was used to tie off a knuckle of the tube.  The tube was cut, sent to Pathology.  Hemostasis was noted.  Posterior   cul-de-sac of Sharan was irrigated of all clots and debris.  Uterus was then returned to the abdomen.  The gutters were cleared of all clots and debris.    Attention was turned to the tubes which were reevaluated after placement of the uterus and they were both noted to be hemostatic.  The peritoneum and rectus   muscles were then reapproximated using 0 Vicryl suture.  The fascia was then closed with 0 Vicryl suture in a running fashion.  Subcutaneous tissue was then   closed with 2-0 Vicryl suture in a running fashion.  The skin was then closed with 4-0 Monocryl suture in a running fashion.     The patient tolerated the procedure well.  Sponge, lap and needle counts were correct x3.  The patient received 2 g of Ancef IV prior to starting the surgery.  She was taken to her room in stable condition.     Medicaid tubal form signed in the OR.    Rae Jimenez MD

## 2023-10-31 NOTE — ADDENDUM NOTE
Addendum  created 10/31/23 1715 by Blayne Mulligan Jr., CRNA    Intraprocedure Meds edited, Orders acknowledged in Narrator

## 2023-11-01 LAB
BASOPHILS # BLD AUTO: 0.03 K/UL (ref 0–0.2)
BASOPHILS NFR BLD: 0.2 % (ref 0–1.9)
DIFFERENTIAL METHOD: ABNORMAL
EOSINOPHIL # BLD AUTO: 0.1 K/UL (ref 0–0.5)
EOSINOPHIL NFR BLD: 0.5 % (ref 0–8)
ERYTHROCYTE [DISTWIDTH] IN BLOOD BY AUTOMATED COUNT: 13.3 % (ref 11.5–14.5)
HCT VFR BLD AUTO: 30.4 % (ref 37–48.5)
HGB BLD-MCNC: 10.7 G/DL (ref 12–16)
IMM GRANULOCYTES # BLD AUTO: 0.1 K/UL (ref 0–0.04)
IMM GRANULOCYTES NFR BLD AUTO: 0.8 % (ref 0–0.5)
LYMPHOCYTES # BLD AUTO: 1.7 K/UL (ref 1–4.8)
LYMPHOCYTES NFR BLD: 12.8 % (ref 18–48)
MCH RBC QN AUTO: 28.5 PG (ref 27–31)
MCHC RBC AUTO-ENTMCNC: 35.2 G/DL (ref 32–36)
MCV RBC AUTO: 81 FL (ref 82–98)
MONOCYTES # BLD AUTO: 1 K/UL (ref 0.3–1)
MONOCYTES NFR BLD: 7.8 % (ref 4–15)
NEUTROPHILS # BLD AUTO: 10.4 K/UL (ref 1.8–7.7)
NEUTROPHILS NFR BLD: 78.7 % (ref 38–73)
NRBC BLD-RTO: 0 /100 WBC
PLATELET # BLD AUTO: 170 K/UL (ref 150–450)
PMV BLD AUTO: 9.7 FL (ref 9.2–12.9)
RBC # BLD AUTO: 3.75 M/UL (ref 4–5.4)
WBC # BLD AUTO: 13.25 K/UL (ref 3.9–12.7)

## 2023-11-01 PROCEDURE — 25000003 PHARM REV CODE 250: Performed by: OBSTETRICS & GYNECOLOGY

## 2023-11-01 PROCEDURE — 36415 COLL VENOUS BLD VENIPUNCTURE: CPT | Performed by: OBSTETRICS & GYNECOLOGY

## 2023-11-01 PROCEDURE — 11000001 HC ACUTE MED/SURG PRIVATE ROOM

## 2023-11-01 PROCEDURE — 63600175 PHARM REV CODE 636 W HCPCS: Performed by: OBSTETRICS & GYNECOLOGY

## 2023-11-01 PROCEDURE — 99231 SBSQ HOSP IP/OBS SF/LOW 25: CPT | Mod: ,,, | Performed by: OBSTETRICS & GYNECOLOGY

## 2023-11-01 PROCEDURE — 99231 PR SUBSEQUENT HOSPITAL CARE,LEVL I: ICD-10-PCS | Mod: ,,, | Performed by: OBSTETRICS & GYNECOLOGY

## 2023-11-01 PROCEDURE — 85025 COMPLETE CBC W/AUTO DIFF WBC: CPT | Performed by: OBSTETRICS & GYNECOLOGY

## 2023-11-01 RX ORDER — OXYCODONE AND ACETAMINOPHEN 10; 325 MG/1; MG/1
1 TABLET ORAL EVERY 4 HOURS PRN
Status: DISCONTINUED | OUTPATIENT
Start: 2023-11-01 | End: 2023-11-02 | Stop reason: HOSPADM

## 2023-11-01 RX ORDER — IBUPROFEN 400 MG/1
800 TABLET ORAL EVERY 8 HOURS
Status: DISCONTINUED | OUTPATIENT
Start: 2023-11-01 | End: 2023-11-02 | Stop reason: HOSPADM

## 2023-11-01 RX ORDER — HYDROMORPHONE HYDROCHLORIDE 1 MG/ML
0.5 INJECTION, SOLUTION INTRAMUSCULAR; INTRAVENOUS; SUBCUTANEOUS ONCE
Status: COMPLETED | OUTPATIENT
Start: 2023-11-01 | End: 2023-11-01

## 2023-11-01 RX ORDER — OXYCODONE AND ACETAMINOPHEN 5; 325 MG/1; MG/1
1 TABLET ORAL EVERY 4 HOURS PRN
Status: DISCONTINUED | OUTPATIENT
Start: 2023-11-01 | End: 2023-11-02 | Stop reason: HOSPADM

## 2023-11-01 RX ADMIN — PRENATAL VIT W/ FE FUMARATE-FA TAB 27-0.8 MG 1 TABLET: 27-0.8 TAB at 08:11

## 2023-11-01 RX ADMIN — OXYCODONE HYDROCHLORIDE 10 MG: 5 TABLET ORAL at 04:11

## 2023-11-01 RX ADMIN — ACETAMINOPHEN 650 MG: 325 TABLET ORAL at 04:11

## 2023-11-01 RX ADMIN — MUPIROCIN: 20 OINTMENT TOPICAL at 08:11

## 2023-11-01 RX ADMIN — IBUPROFEN 800 MG: 400 TABLET ORAL at 02:11

## 2023-11-01 RX ADMIN — SIMETHICONE CHEW TAB 80 MG 80 MG: 80 TABLET ORAL at 04:11

## 2023-11-01 RX ADMIN — IBUPROFEN 800 MG: 400 TABLET ORAL at 09:11

## 2023-11-01 RX ADMIN — DOCUSATE SODIUM 200 MG: 100 CAPSULE, LIQUID FILLED ORAL at 08:11

## 2023-11-01 RX ADMIN — HYDROMORPHONE HYDROCHLORIDE 0.5 MG: 1 INJECTION, SOLUTION INTRAMUSCULAR; INTRAVENOUS; SUBCUTANEOUS at 02:11

## 2023-11-01 RX ADMIN — OXYCODONE AND ACETAMINOPHEN 1 TABLET: 10; 325 TABLET ORAL at 08:11

## 2023-11-01 RX ADMIN — OXYCODONE AND ACETAMINOPHEN 1 TABLET: 10; 325 TABLET ORAL at 04:11

## 2023-11-01 RX ADMIN — ACETAMINOPHEN 650 MG: 325 TABLET ORAL at 12:11

## 2023-11-01 RX ADMIN — OXYCODONE HYDROCHLORIDE 10 MG: 5 TABLET ORAL at 08:11

## 2023-11-01 RX ADMIN — OXYCODONE HYDROCHLORIDE 10 MG: 5 TABLET ORAL at 12:11

## 2023-11-01 RX ADMIN — KETOROLAC TROMETHAMINE 30 MG: 30 INJECTION, SOLUTION INTRAMUSCULAR; INTRAVENOUS at 04:11

## 2023-11-01 NOTE — PLAN OF CARE
Mother tolerating regular diet. Ambulating and voiding. Passing flatulence. Rubra light throughout the night. LTV incision remains CDI with aquacel. Pain well controlled with pain meds. Vital signs stable. No distress noted. Interacting and bonding well with infant. Spouse remains at bedside.

## 2023-11-01 NOTE — PROGRESS NOTES
POD #1    S:  patient in lots of pain. Voiding. Tolerating PO. Passing flatus.    O  Temp:  [97.6 °F (36.4 °C)-98.2 °F (36.8 °C)]   Pulse:  [55-90]   Resp:  [16-18]   BP: ()/(50-84)   SpO2:  [94 %-100 %]    Gen: a&ox3, NAD  Abd: soft, appropriate ttp, incision with bandage c/d/i  Ext: no edema noted    Lab Results   Component Value Date    WBC 13.25 (H) 2023    HGB 10.7 (L) 2023    HCT 30.4 (L) 2023    MCV 81 (L) 2023     2023       B POS,    AP: 32 yo  s/p rLTCS/BTL  Continue routine postop care  Will give IV dilaudid now to help with pain  Continue PO pain meds  Declines male infant for circumcision    SHERWIN amato MD

## 2023-11-01 NOTE — PLAN OF CARE
Note copied from Infant's chart ([id number])         SOCIAL WORK DISCHARGE PLANNING ASSESSMENT     Sw completed discharge planning assessment with pt's mother in mother's room K301. Pt's mother was easily engaged and education on the role of  was provided. Pt's mother reported all necessities for patient were obtained, including a car seat. Pt's mother reported she has good support from family and friends and advised pt's maternal grandmother Roxane will provide assistance as needed after returning home. Pt's father Felice will provide transportation home following discharge. No needs for community resources were reported. Pt's mother was encouraged to call with any questions or concerns. Pt's mother verbalized understanding.      Legal Name: Maykel Kelley           :  10/31/2023  Address: [address] [place] [address] [place] [address]  Parent's Phone Numbers: pt's mother Demario Christine [phone] and pt's father Felice Kelley [phone]      Pediatrician:  Dr. Marcelo Avery               Patient Active Problem List   Diagnosis    [place] infant of 39 completed weeks of gestation            Birth Hospital:[facility]           SONYA: 23     Birth Weight:   3.32 kg (7 lb 5.1 oz)                            Gestational Age: 39w3d           Apgars    Living status: Living  Apgar Component Scores:  1 min.:  5 min.:  10 min.:  15 min.:  20 min.:    Skin color:  1  1          Heart rate:  2  2          Reflex irritability:  2  2          Muscle tone:  2  2          Respiratory effort:  2  2          Total:  9  9          Apgars assigned by: ABHAY CHRISTIANSEN RN           23 1352   OB Discharge Planning Assessment   Assessment Type Discharge Planning Assessment   Source of Information family   Verified Demographic and Insurance Information Yes   Insurance Medicaid   Medicaid [employer]   Medicaid Insurance Primary   Spiritual Affiliation [employer]ism   Pastoral Care/Clergy/ Contact Status  none needed   Father's Involvement Fully Involved   Is Father signing the birth certificate Yes   Father's Address [facility] [place] [address]   Family Involvement High   Primary Contact Name and Number pt's mother Demario Christine [phone] and pt's father Felice Kelley [phone]   Other Contacts Names and Numbers pt's maternal grandmother Roxane Christine [phone]   Received Prenatal Care Yes   Transportation Anticipated family or friend will provide   Receive [facility] Benefits Already certified, will apply for new born    Arrangements Self;Family;Friends   Infant Feeding Plan formula feeding   Does baby have crib or safe sleep space? Yes   Do you have a car seat? Yes   Has other essential care items? Clothing;Diapers;Bottles   Pediatrician Dr. Marcelo Avery   Resources/Education Provided Preparing for Your Baby's Discharge Home   DCFS No indications (Indicators for Report)   Discharge Plan A Home with family

## 2023-11-02 VITALS
DIASTOLIC BLOOD PRESSURE: 68 MMHG | TEMPERATURE: 98 F | RESPIRATION RATE: 18 BRPM | OXYGEN SATURATION: 99 % | WEIGHT: 215 LBS | HEART RATE: 72 BPM | HEIGHT: 65 IN | BODY MASS INDEX: 35.82 KG/M2 | SYSTOLIC BLOOD PRESSURE: 110 MMHG

## 2023-11-02 DIAGNOSIS — G89.18 POSTOPERATIVE PAIN: Primary | ICD-10-CM

## 2023-11-02 PROCEDURE — 99238 HOSP IP/OBS DSCHRG MGMT 30/<: CPT | Mod: ,,, | Performed by: OBSTETRICS & GYNECOLOGY

## 2023-11-02 PROCEDURE — 99238 PR HOSPITAL DISCHARGE DAY,<30 MIN: ICD-10-PCS | Mod: ,,, | Performed by: OBSTETRICS & GYNECOLOGY

## 2023-11-02 PROCEDURE — 25000003 PHARM REV CODE 250: Performed by: OBSTETRICS & GYNECOLOGY

## 2023-11-02 RX ORDER — IBUPROFEN 800 MG/1
800 TABLET ORAL EVERY 8 HOURS PRN
Qty: 30 TABLET | Refills: 0 | Status: SHIPPED | OUTPATIENT
Start: 2023-11-02 | End: 2024-11-01

## 2023-11-02 RX ORDER — OXYCODONE AND ACETAMINOPHEN 5; 325 MG/1; MG/1
1 TABLET ORAL EVERY 6 HOURS PRN
Qty: 28 TABLET | Refills: 0 | Status: SHIPPED | OUTPATIENT
Start: 2023-11-02

## 2023-11-02 RX ADMIN — OXYCODONE AND ACETAMINOPHEN 1 TABLET: 10; 325 TABLET ORAL at 01:11

## 2023-11-02 RX ADMIN — OXYCODONE AND ACETAMINOPHEN 1 TABLET: 10; 325 TABLET ORAL at 10:11

## 2023-11-02 RX ADMIN — IBUPROFEN 800 MG: 400 TABLET ORAL at 05:11

## 2023-11-02 RX ADMIN — DOCUSATE SODIUM 200 MG: 100 CAPSULE, LIQUID FILLED ORAL at 09:11

## 2023-11-02 RX ADMIN — MUPIROCIN: 20 OINTMENT TOPICAL at 09:11

## 2023-11-02 RX ADMIN — OXYCODONE AND ACETAMINOPHEN 1 TABLET: 10; 325 TABLET ORAL at 05:11

## 2023-11-02 RX ADMIN — PRENATAL VIT W/ FE FUMARATE-FA TAB 27-0.8 MG 1 TABLET: 27-0.8 TAB at 09:11

## 2023-11-02 NOTE — DISCHARGE INSTRUCTIONS
"Patient Discharge Instructions for Postpartum Women    Resume Regular Diet  Increase activity gradually, no heavy lifting  Shower  No tampons, douching or sexual intercourse.  Discuss birth control options with your physician.  Wear a support bra  Return to work/school when you've been cleared by a physician    Call your physician if     *Fever of 100.4 or higher  *Persistent nausea/ vomiting  *Incisional drainage  *Heavy vaginal bleeding or large clots (Heavy bleeding is soaking 1 pad in an hour)  *Swelling and pain in arms or legs  *Severe headaches, blurred vision or fainting  *Shortness of breath  *Frequency and burning with urination  *Signs of postpartum depression, discuss these signs with your physician    Call lactation services for questions regarding feeding, nipple and breast care, and general questions about lactation.  They can be reached at [phone]         Understanding Postpartum Depression    You've just had a baby.  You know you should be excited and happy.  But instead you find yourself crying for no reason.  You may have trouble coping with your daily tasks.  You feel sad, tired, and hopeless most of the time.  You may even feel ashamed or guilty.  But what you're going through is not your fault and you can feel better.  Talk to your doctor.  He or she can help.    Depression After Childbirth    You may be weepy and tired right after giving birth.  These feelings are normal.  They're sometimes called the "baby blues."  These blues go away 2-3 weeks.  However, postpartum (meaning "after birth") depression lasts much longer and is more sever than the "baby blues."  It can make you feel sad and hopeless.  You may also fear that your baby will be harmed and worry about being a bad mother.      What is Depression?    Depression is a mood disorder that affects the way you think and feel.  The most common symptom is a feeling of deep sadness.  You may also feel as if you just can't cope with life.  "   Other symptoms include:      * Gaining or loosing weight  * Sleeping too much or too little  * Feeling tired all the time  * Feeling restless  * Fears of harming your baby   * Lack of interest in your baby  * Feeling worthless or guilty  * No longer finding pleasure in things you used to  * Having trouble thinking clearly or making decisions  * Thoughts of hurting yourself or your baby    What Causes Postpartum Depression    The exact causes of postpartum depression isn't known.  It may be due to changes in your hormones during and after childbirth.  You may also be tired from caring for your baby and adjusting to being a mother.  All these factors may make you feel depressed.  In some cases, your genes may also play a role.    Depression Can Be Treated    The good news is that there are many ways to treat postpartum depression.  Talking to your doctor is the first step toward feeling better.    Resources:    * National [place] of Mental Health  -- 998.297.9749    www.nimh.nih.gov    * National [place] on Mental Illness --366.464.5779    Www.junior.org    * Mental Health [place] -- 714.195.1813     Www.[facility].org    * National Suicide Hotline --887.358.6881 (800-SUICIDE)    8138-1700 [employer]  All rights reserved.  This information is not intended as a substitute for professional medical care.  Always follow up with your healthcare professional's instructions.

## 2023-11-02 NOTE — PLAN OF CARE
Patient tolerating regular diet. Ambulating and voiding without difficulty. Rubra light throughout the night. LTV incision remains CDI with aquacel. Pain well controlled with pain meds. Vital signs stable. No distress noted. Interacting and bonding well with infant. Spouse remains at bedside.

## 2023-11-02 NOTE — DISCHARGE SUMMARY
[facility]  Obstetrics  Discharge Summary      Patient Name: Demario Christine  MRN: [id number]  Admission Date: 10/31/2023  Hospital Length of Stay: 2 days  Discharge Date and Time:  2023 8:12 AM  Attending Physician: Rae Jimenez MD   Discharging Provider: Rae Jimenez MD  Primary Care Provider: Cheryl Ba MD    HPI: 34 yo now  female admitted at 39+ weeks for rLTCS and BTL.    Procedure(s) (LRB):   SECTION, WITH TUBAL LIGATION (N/A)     Hospital Course: The patient's labor course was uncomplicated. She is now s/p uncomplicated repeat LTCS with BTL; please see my operative note for details.   Her postpartum course was also uncomplicated. On day of discharge, she was tolerating PO. Her pain was well controlled. She was ambulating and voiding spontaneously.           Final Active Diagnoses:    Diagnosis Date Noted POA    PRINCIPAL PROBLEM:   delivery delivered [O82] 2020 Yes    S/P tubal ligation [Z98.51] 10/31/2023 Not Applicable      Problems Resolved During this Admission:        Lab Results   Component Value Date    WBC 13.25 (H) 2023    HGB 10.7 (L) 2023    HCT 30.4 (L) 2023    MCV 81 (L) 2023     2023       B POS      Feeding Method: both breast and bottle    Immunizations       Date Immunization Status Dose Route/Site Given by    10/19/18 0000 Influenza - Quadrivalent - PF *Preferred* (6 months and older) Given 0.5 mL Intramuscular/Right deltoid     17 0000 Influenza - Quadrivalent - PF *Preferred* (6 months and older) Given 0.5 mL Intramuscular/Right deltoid     16 0000 Influenza - Quadrivalent - PF *Preferred* (6 months and older) Given 0.5 mL Intramuscular/Right deltoid     10/03/19 0000 Influenza - Quadrivalent - PF *Preferred* (6 months and older) Given 0.5 mL Intramuscular/Left deltoid     20 0000 Influenza - Quadrivalent - PF *Preferred* (6 months and older) Given 0.5 mL  Intramuscular/Left deltoid     22 0000 COVID-19, mRNA, LNP-S, bivalent booster, PF (PFIZER OMICRON) Given 0.3 mL Intramuscular/Left arm     10/31/23 1241 MMR Incomplete 0.5 mL Subcutaneous/     10/31/23 1244 Tdap Deleted 0.5 mL Intramuscular/             Delivery:    Episiotomy:     Lacerations:     Repair suture:     Repair # of packets:     Blood loss (ml):       Birth information:  YOB: 2023   Time of birth: 8:13 AM   Sex: male   Delivery type: , Low Transverse   Gestational Age: 39w3d     Measurements    Weight: 3320 g  Weight (lbs): 7 lb 5.1 oz  Length:          Delivery Clinician: Delivery Providers    Delivering clinician: Rae Jimenez MD   Provider Role    Clarisa Aguirre Assist    Noe TavaresOur Lady of the Sea HospitalGini RN Registered Nurse    Wendi Christiansen RN Registered Nurse             Additional  information:  Forceps:    Vacuum:    Breech:    Observed anomalies      Living?:     Apgars    Living status: Living  Apgar Component Scores:  1 min.:  5 min.:  10 min.:  15 min.:  20 min.:    Skin color:  1  1       Heart rate:  2  2       Reflex irritability:  2  2       Muscle tone:  2  2       Respiratory effort:  2  2       Total:  9  9       Apgars assigned by: ABHAY CHRISTIANSEN RN         Placenta: Delivered:       appearance  Pending Diagnostic Studies:       Procedure Component Value Units Date/Time    Specimen to Pathology, Surgery Gynecology and Obstetrics [[id number]] Collected: 10/31/23 0830    Order Status: Sent Lab Status: In process Updated: 23    Specimen: Tissue             Discharged Condition: good    Disposition: Home or Self Care    Follow Up:    Patient Instructions:   No discharge procedures on file.  Medications:  Current Discharge Medication List        CONTINUE these medications which have NOT CHANGED    Details   chlorhexidine (PERIDEX) 0.12 % solution SMARTSI Capful(s) By Mouth Twice Daily      clindamycin  (CLEOCIN) 150 MG capsule Take 300 mg by mouth every 6 (six) hours.      norethindrone (ORTHO MICRONOR) 0.35 mg tablet Take 1 tablet (0.35 mg total) by mouth once daily.  Qty: 90 tablet, Refills: 3    Associated Diagnoses: Encounter for other contraceptive management      !! ondansetron (ZOFRAN) 4 MG tablet Take 1 tablet (4 mg total) by mouth daily as needed for Nausea.  Qty: 30 tablet, Refills: 12      !! ondansetron (ZOFRAN) 4 MG tablet Take 1 tablet (4 mg total) by mouth daily as needed for Nausea.  Qty: 30 tablet, Refills: 12      prenatal vit/iron fum/folic ac (PRENATAL 1+1 ORAL) Take by mouth.       !! - Potential duplicate medications found. Please discuss with provider.          Rae Jimenez MD  Obstetrics  [facility]

## 2023-11-03 LAB
FINAL PATHOLOGIC DIAGNOSIS: NORMAL
GROSS: NORMAL
Lab: NORMAL

## 2023-11-07 ENCOUNTER — POSTPARTUM VISIT (OUTPATIENT)
Dept: OBSTETRICS AND GYNECOLOGY | Facility: CLINIC | Age: 33
End: 2023-11-07
Payer: MEDICAID

## 2023-11-07 VITALS
BODY MASS INDEX: 33.71 KG/M2 | SYSTOLIC BLOOD PRESSURE: 126 MMHG | WEIGHT: 202.63 LBS | DIASTOLIC BLOOD PRESSURE: 78 MMHG

## 2023-11-07 PROCEDURE — 59430 PR CARE AFTER DELIVERY ONLY: ICD-10-PCS | Mod: ,,, | Performed by: OBSTETRICS & GYNECOLOGY

## 2023-11-07 PROCEDURE — 99999 PR PBB SHADOW E&M-EST. PATIENT-LVL III: ICD-10-PCS | Mod: PBBFAC,,, | Performed by: OBSTETRICS & GYNECOLOGY

## 2023-11-07 PROCEDURE — 99213 OFFICE O/P EST LOW 20 MIN: CPT | Mod: PBBFAC,PO | Performed by: OBSTETRICS & GYNECOLOGY

## 2023-11-07 PROCEDURE — 99999 PR PBB SHADOW E&M-EST. PATIENT-LVL III: CPT | Mod: PBBFAC,,, | Performed by: OBSTETRICS & GYNECOLOGY

## 2023-11-07 NOTE — PROGRESS NOTES
The patient presents for her postpartum visit or incision check. She has no complaints today. Denies fever, chills. She denies pain with urination.  Denies diarrhea. Denies constipation. She denies problems with her incision.  Pain well controlled with PO pain medications.    Type of Delivery:  She had rLTCS/BTL  Delivery Date: 10/31/2023  Delivery MD:  halle jimenez MD  Gender:  male  Baby Name: Maykel  Breast Feeding: yes  Vaginal Bleeding: min  Incontinence: no  Depression: a little bit  [place] yet: no  Contraception discussed and patient had BTL    ROS: negative    PE:   Vitals: /78   Wt 91.9 kg (202 lb 9.6 oz)   LMP 02/02/2023   Breastfeeding Yes   BMI 33.71 kg/m²   APPEARANCE: Well nourished, well developed, in no acute distress.  ABDOMEN: Soft. No tenderness or masses. No hepatosplenomegaly. No hernias. Incision c/d/i; no erythema or purulent discharge  PELVIC: deferred    A/P: Postpartum visit/Incision check  -patient doing well, incision healing well  -contraception: s/p BTL - pathology reviewed with patient today      F/u in 4 wks for postpartum visit    SHERWIN Jimenez MD

## 2023-12-13 ENCOUNTER — POSTPARTUM VISIT (OUTPATIENT)
Dept: OBSTETRICS AND GYNECOLOGY | Facility: CLINIC | Age: 33
End: 2023-12-13
Payer: MEDICAID

## 2023-12-13 VITALS
WEIGHT: 202.63 LBS | BODY MASS INDEX: 33.71 KG/M2 | SYSTOLIC BLOOD PRESSURE: 120 MMHG | DIASTOLIC BLOOD PRESSURE: 78 MMHG

## 2023-12-13 DIAGNOSIS — Z12.4 CERVICAL CANCER SCREENING: ICD-10-CM

## 2023-12-13 PROCEDURE — 0503F POSTPARTUM CARE VISIT: CPT | Mod: CPTII,,, | Performed by: OBSTETRICS & GYNECOLOGY

## 2023-12-13 PROCEDURE — 0503F PR POSTPARTUM CARE VISIT: ICD-10-PCS | Mod: CPTII,,, | Performed by: OBSTETRICS & GYNECOLOGY

## 2023-12-13 PROCEDURE — 99999 PR PBB SHADOW E&M-EST. PATIENT-LVL III: ICD-10-PCS | Mod: PBBFAC,,, | Performed by: OBSTETRICS & GYNECOLOGY

## 2023-12-13 PROCEDURE — 88175 CYTOPATH C/V AUTO FLUID REDO: CPT | Performed by: OBSTETRICS & GYNECOLOGY

## 2023-12-13 PROCEDURE — 99213 OFFICE O/P EST LOW 20 MIN: CPT | Mod: PBBFAC,PO | Performed by: OBSTETRICS & GYNECOLOGY

## 2023-12-13 PROCEDURE — 99999 PR PBB SHADOW E&M-EST. PATIENT-LVL III: CPT | Mod: PBBFAC,,, | Performed by: OBSTETRICS & GYNECOLOGY

## 2023-12-29 LAB
FINAL PATHOLOGIC DIAGNOSIS: NORMAL
Lab: NORMAL

## 2024-01-05 NOTE — PROGRESS NOTES
The patient presents for her postpartum visit /incision check. She has no complaints today. Denies fever, chills. She denies pain with urination.  Denies diarrhea. Denies constipation. She denies problems with her incision.  Incision feels sore but she is doing well today.    Type of Delivery:  She had rLTCS/BTL  Delivery Date: 10/31/2023  Delivery MD:  halle jimenez MD  Gender:  male  Baby Name: Maykel  Breast Feeding: yes  Vaginal Bleeding: min  Incontinence: no  Depression: a little bit  [place] yet: no  Contraception discussed and patient had BTL    ROS: negative    PE:   Vitals: /78   Wt 91.9 kg (202 lb 9.6 oz)   LMP  (LMP Unknown)   Breastfeeding Yes   BMI 33.71 kg/m²   APPEARANCE: Well nourished, well developed, in no acute distress.  ABDOMEN: Soft. No tenderness or masses. No hepatosplenomegaly. No hernias. Incision c/d/i; no erythema or purulent discharge  PELVIC: normal appearing cervix; no lesions noted.    A/P: Postpartum visit/Incision check  -patient doing well, incision healing well  -contraception: s/p BTL   -pap collected today      SHERWIN Jimenez MD  
 pap is normal    Your pelvic exam will still need to occur once a year!    See you then!    Dr amaot
0

## 2024-01-31 ENCOUNTER — PATIENT MESSAGE (OUTPATIENT)
Dept: OBSTETRICS AND GYNECOLOGY | Facility: CLINIC | Age: 34
End: 2024-01-31
Payer: MEDICAID

## 2024-08-06 ENCOUNTER — OFFICE VISIT (OUTPATIENT)
Dept: FAMILY MEDICINE | Facility: CLINIC | Age: 34
End: 2024-08-06
Payer: MEDICAID

## 2024-08-06 ENCOUNTER — LAB VISIT (OUTPATIENT)
Dept: LAB | Facility: HOSPITAL | Age: 34
End: 2024-08-06
Attending: INTERNAL MEDICINE
Payer: MEDICAID

## 2024-08-06 VITALS
TEMPERATURE: 98 F | HEART RATE: 66 BPM | WEIGHT: 188.69 LBS | HEIGHT: 65 IN | SYSTOLIC BLOOD PRESSURE: 124 MMHG | OXYGEN SATURATION: 98 % | BODY MASS INDEX: 31.44 KG/M2 | DIASTOLIC BLOOD PRESSURE: 80 MMHG | RESPIRATION RATE: 16 BRPM

## 2024-08-06 DIAGNOSIS — N63.10 PAINFUL LUMPY RIGHT BREAST: ICD-10-CM

## 2024-08-06 DIAGNOSIS — Z00.00 ANNUAL PHYSICAL EXAM: ICD-10-CM

## 2024-08-06 DIAGNOSIS — E55.9 VITAMIN D DEFICIENCY: ICD-10-CM

## 2024-08-06 DIAGNOSIS — N64.4 PAINFUL LUMPY RIGHT BREAST: ICD-10-CM

## 2024-08-06 DIAGNOSIS — Z00.00 ANNUAL PHYSICAL EXAM: Primary | ICD-10-CM

## 2024-08-06 DIAGNOSIS — D50.9 MICROCYTIC ANEMIA: ICD-10-CM

## 2024-08-06 LAB
ALBUMIN SERPL BCP-MCNC: 3.8 G/DL (ref 3.5–5.2)
ALP SERPL-CCNC: 76 U/L (ref 55–135)
ALT SERPL W/O P-5'-P-CCNC: 14 U/L (ref 10–44)
ANION GAP SERPL CALC-SCNC: 6 MMOL/L (ref 8–16)
AST SERPL-CCNC: 17 U/L (ref 10–40)
BASOPHILS # BLD AUTO: 0.03 K/UL (ref 0–0.2)
BASOPHILS NFR BLD: 0.8 % (ref 0–1.9)
BILIRUB SERPL-MCNC: 0.5 MG/DL (ref 0.1–1)
BUN SERPL-MCNC: 10 MG/DL (ref 6–20)
CALCIUM SERPL-MCNC: 9.6 MG/DL (ref 8.7–10.5)
CHLORIDE SERPL-SCNC: 108 MMOL/L (ref 95–110)
CHOLEST SERPL-MCNC: 161 MG/DL (ref 120–199)
CHOLEST/HDLC SERPL: 2.9 {RATIO} (ref 2–5)
CO2 SERPL-SCNC: 25 MMOL/L (ref 23–29)
CREAT SERPL-MCNC: 0.8 MG/DL (ref 0.5–1.4)
DIFFERENTIAL METHOD BLD: ABNORMAL
EOSINOPHIL # BLD AUTO: 0 K/UL (ref 0–0.5)
EOSINOPHIL NFR BLD: 1.1 % (ref 0–8)
ERYTHROCYTE [DISTWIDTH] IN BLOOD BY AUTOMATED COUNT: 12.1 % (ref 11.5–14.5)
EST. GFR  (NO RACE VARIABLE): >60 ML/MIN/1.73 M^2
ESTIMATED AVG GLUCOSE: 97 MG/DL (ref 68–131)
FERRITIN SERPL-MCNC: 27 NG/ML (ref 20–300)
GLUCOSE SERPL-MCNC: 86 MG/DL (ref 70–110)
HBA1C MFR BLD: 5 % (ref 4–5.6)
HCT VFR BLD AUTO: 37.7 % (ref 37–48.5)
HDLC SERPL-MCNC: 55 MG/DL (ref 40–75)
HDLC SERPL: 34.2 % (ref 20–50)
HGB BLD-MCNC: 12.9 G/DL (ref 12–16)
IMM GRANULOCYTES # BLD AUTO: 0.01 K/UL (ref 0–0.04)
IMM GRANULOCYTES NFR BLD AUTO: 0.3 % (ref 0–0.5)
IRON SERPL-MCNC: 59 UG/DL (ref 30–160)
LDLC SERPL CALC-MCNC: 94.6 MG/DL (ref 63–159)
LYMPHOCYTES # BLD AUTO: 1.1 K/UL (ref 1–4.8)
LYMPHOCYTES NFR BLD: 30.4 % (ref 18–48)
MCH RBC QN AUTO: 28.7 PG (ref 27–31)
MCHC RBC AUTO-ENTMCNC: 34.2 G/DL (ref 32–36)
MCV RBC AUTO: 84 FL (ref 82–98)
MONOCYTES # BLD AUTO: 0.3 K/UL (ref 0.3–1)
MONOCYTES NFR BLD: 9.1 % (ref 4–15)
NEUTROPHILS # BLD AUTO: 2.2 K/UL (ref 1.8–7.7)
NEUTROPHILS NFR BLD: 58.3 % (ref 38–73)
NONHDLC SERPL-MCNC: 106 MG/DL
NRBC BLD-RTO: 0 /100 WBC
PLATELET # BLD AUTO: 257 K/UL (ref 150–450)
PMV BLD AUTO: 10.1 FL (ref 9.2–12.9)
POTASSIUM SERPL-SCNC: 4 MMOL/L (ref 3.5–5.1)
PROT SERPL-MCNC: 7.1 G/DL (ref 6–8.4)
RBC # BLD AUTO: 4.49 M/UL (ref 4–5.4)
SATURATED IRON: 15 % (ref 20–50)
SODIUM SERPL-SCNC: 139 MMOL/L (ref 136–145)
TOTAL IRON BINDING CAPACITY: 385 UG/DL (ref 250–450)
TRANSFERRIN SERPL-MCNC: 260 MG/DL (ref 200–375)
TRIGL SERPL-MCNC: 57 MG/DL (ref 30–150)
TSH SERPL DL<=0.005 MIU/L-ACNC: 0.7 UIU/ML (ref 0.4–4)
WBC # BLD AUTO: 3.72 K/UL (ref 3.9–12.7)

## 2024-08-06 PROCEDURE — 84443 ASSAY THYROID STIM HORMONE: CPT | Performed by: INTERNAL MEDICINE

## 2024-08-06 PROCEDURE — 83036 HEMOGLOBIN GLYCOSYLATED A1C: CPT | Performed by: INTERNAL MEDICINE

## 2024-08-06 PROCEDURE — 36415 COLL VENOUS BLD VENIPUNCTURE: CPT | Mod: PO | Performed by: INTERNAL MEDICINE

## 2024-08-06 PROCEDURE — 3074F SYST BP LT 130 MM HG: CPT | Mod: CPTII,,, | Performed by: INTERNAL MEDICINE

## 2024-08-06 PROCEDURE — 82652 VIT D 1 25-DIHYDROXY: CPT | Performed by: INTERNAL MEDICINE

## 2024-08-06 PROCEDURE — 99395 PREV VISIT EST AGE 18-39: CPT | Mod: S$PBB,,, | Performed by: INTERNAL MEDICINE

## 2024-08-06 PROCEDURE — 82728 ASSAY OF FERRITIN: CPT | Performed by: INTERNAL MEDICINE

## 2024-08-06 PROCEDURE — 1160F RVW MEDS BY RX/DR IN RCRD: CPT | Mod: CPTII,,, | Performed by: INTERNAL MEDICINE

## 2024-08-06 PROCEDURE — 80061 LIPID PANEL: CPT | Performed by: INTERNAL MEDICINE

## 2024-08-06 PROCEDURE — 99999 PR PBB SHADOW E&M-EST. PATIENT-LVL IV: CPT | Mod: PBBFAC,,, | Performed by: INTERNAL MEDICINE

## 2024-08-06 PROCEDURE — 80053 COMPREHEN METABOLIC PANEL: CPT | Performed by: INTERNAL MEDICINE

## 2024-08-06 PROCEDURE — 3079F DIAST BP 80-89 MM HG: CPT | Mod: CPTII,,, | Performed by: INTERNAL MEDICINE

## 2024-08-06 PROCEDURE — 1159F MED LIST DOCD IN RCRD: CPT | Mod: CPTII,,, | Performed by: INTERNAL MEDICINE

## 2024-08-06 PROCEDURE — 83540 ASSAY OF IRON: CPT | Performed by: INTERNAL MEDICINE

## 2024-08-06 PROCEDURE — 85025 COMPLETE CBC W/AUTO DIFF WBC: CPT | Performed by: INTERNAL MEDICINE

## 2024-08-06 PROCEDURE — 99214 OFFICE O/P EST MOD 30 MIN: CPT | Mod: PBBFAC,PO | Performed by: INTERNAL MEDICINE

## 2024-08-06 PROCEDURE — 3008F BODY MASS INDEX DOCD: CPT | Mod: CPTII,,, | Performed by: INTERNAL MEDICINE

## 2024-08-08 LAB — 1,25(OH)2D3 SERPL-MCNC: 57 PG/ML (ref 20–79)

## (undated) DEVICE — DRESSING AQUACEL AG ADV 3.5X6

## (undated) DEVICE — DRESSING AQUACEL AG ADV 3.5X12